# Patient Record
Sex: FEMALE | Race: WHITE | Employment: UNEMPLOYED | URBAN - METROPOLITAN AREA
[De-identification: names, ages, dates, MRNs, and addresses within clinical notes are randomized per-mention and may not be internally consistent; named-entity substitution may affect disease eponyms.]

---

## 2017-03-17 ENCOUNTER — OFFICE VISIT (OUTPATIENT)
Dept: FAMILY MEDICINE CLINIC | Age: 52
End: 2017-03-17

## 2017-03-17 VITALS
HEART RATE: 95 BPM | DIASTOLIC BLOOD PRESSURE: 71 MMHG | OXYGEN SATURATION: 98 % | WEIGHT: 131 LBS | RESPIRATION RATE: 20 BRPM | BODY MASS INDEX: 24.73 KG/M2 | SYSTOLIC BLOOD PRESSURE: 134 MMHG | TEMPERATURE: 98.8 F | HEIGHT: 61 IN

## 2017-03-17 DIAGNOSIS — R07.81 RIB PAIN ON LEFT SIDE: ICD-10-CM

## 2017-03-17 DIAGNOSIS — S22.42XA CLOSED FRACTURE OF MULTIPLE RIBS OF LEFT SIDE, INITIAL ENCOUNTER: ICD-10-CM
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
## 2017-03-17 DIAGNOSIS — E78.5 DYSLIPIDEMIA, GOAL LDL BELOW 100: Primary | Chronic | ICD-10-CM

## 2017-03-17 RX ORDER — HYDROCODONE BITARTRATE AND ACETAMINOPHEN 5; 325 MG/1; MG/1
1 TABLET ORAL
Qty: 30 TAB | Refills: 0 | Status: SHIPPED | OUTPATIENT
Start: 2017-03-17 | End: 2018-09-13 | Stop reason: ALTCHOICE

## 2017-03-17 NOTE — MR AVS SNAPSHOT
Visit Information Date & Time Provider Department Dept. Phone Encounter #  
 3/17/2017  9:30 AM Selin Velazquez NP [facility] [phone] [id number] Follow-up Instructions Return in about 6 months (around 9/17/2017), or if symptoms worsen or fail to improve. Upcoming Health Maintenance Date Due  
 PAP AKA CERVICAL CYTOLOGY 2/23/2018 BREAST CANCER SCRN MAMMOGRAM 8/12/2018 DTaP/Tdap/Td series (2 - Td) 11/30/2022 COLONOSCOPY 4/11/2026 Allergies as of 3/17/2017  Review Complete On: 3/17/2017 By: Preethi Packer LPN Severity Noted Reaction Type Reactions Flomax [Tamsulosin] High 05/20/2011    Other (comments) Severe headache Percocet [Oxycodone-acetaminophen] High 10/22/2010    Nausea and Vomiting, Other (comments)  
 dizziness Lipitor [Atorvastatin] Medium 06/24/2013   Side Effect Other (comments) Dizzy and nausea Zetia [Ezetimibe] Medium 12/10/2010   Side Effect Other (comments)  
 headaches  
 Gabapentin  03/15/2010    Nausea and Vomiting  
 dizziness Prednisone  03/15/2010    Nausea and Vomiting Current Immunizations  Reviewed on 1/14/2015 Name Date Influenza Vaccine 11/18/2014  4:43 PM, 11/22/2013, 1/14/2013 TDAP Vaccine 11/30/2012 Not reviewed this visit You Were Diagnosed With   
  
 Codes Comments Rib pain on left side    -  Primary ICD-10-CM: R07.81 ICD-9-CM: 786.50 Fall from ground level     ICD-10-CM: O16.92TX ICD-9-CM: E888.9 Dyslipidemia, goal LDL below 100     ICD-10-CM: E78.5 ICD-9-CM: 272.4 Closed fracture of multiple ribs of left side, initial encounter     ICD-10-CM: S22.42XA ICD-9-CM: 807.09 Vitals BP Pulse Temp Resp Height(growth percentile) Weight(growth percentile) 134/71 95 98.8 °F (37.1 °C) (Oral) 20 5' 1\" (1.549 m) 131 lb (59.4 kg) SpO2 BMI OB Status Smoking Status 98% 24.75 kg/m2 Needs review Never Smoker Vitals History BMI and BSA Data Body Mass Index Body Surface Area 24.75 kg/m 2 1.6 m 2 Preferred Pharmacy Pharmacy Name Phone [address], [address] [phone] Your Updated Medication List  
  
   
This list is accurate as of: 3/17/17 10:39 AM.  Always use your most recent med list.  
  
  
  
  
 atorvastatin 40 mg tablet Commonly known as:  LIPITOR  
  
 calcium citrate-vitamin D3 tablet Commonly known as:  CITRACAL WITH VITAMIN D MAXIMUM Take 1 Tab by mouth two (2) times a day. folic acid 1 mg tablet Commonly known as:  Google Take 1 mg by mouth daily. HYDROcodone-acetaminophen 5-325 mg per tablet Commonly known as:  Cholo Dolphin Take 1 Tab by mouth every six (6) hours as needed for Pain. Max Daily Amount: 4 Tabs. ibuprofen 800 mg tablet Commonly known as:  MOTRIN Take 1 Tab by mouth daily. methotrexate 2.5 mg tablet Commonly known as:  RHEUMATREX  
2.5 mg Every Friday. rosuvastatin 20 mg tablet Commonly known as:  CRESTOR Take 1 Tab by mouth nightly. traMADol 50 mg tablet Commonly known as:  ULTRAM  
TAKE 1 TABLET BY MOUTH EVERY 8 HOURS AS NEEDED FOR PAIN, MAXIMUM 3 TABLETS PER DAY. VITAMIN D3 1,000 unit tablet Generic drug:  cholecalciferol Take 1,000 Units by mouth daily. Prescriptions Printed Refills HYDROcodone-acetaminophen (NORCO) 5-325 mg per tablet 0 Sig: Take 1 Tab by mouth every six (6) hours as needed for Pain. Max Daily Amount: 4 Tabs. Class: Print Route: Oral  
  
We Performed the Following LIPID PANEL [64927 CPT(R)] METABOLIC PANEL, COMPREHENSIVE [64076 CPT(R)] Follow-up Instructions Return in about 6 months (around 9/17/2017), or if symptoms worsen or fail to improve. To-Do List   
 03/17/2017 Imaging:  XR RIBS LT W PA CXR MIN 3 V Introducing [facility]! Evan Anaya introduces Youlicit patient portal. Now you can access parts of your medical record, email your doctor's office, and request medication refills online. 1. In your internet browser, go to [email or web address] 2. Click on the First Time User? Click Here link in the Sign In box. You will see the New Member Sign Up page. 3. Enter your Youlicit Access Code exactly as it appears below. You will not need to use this code after youve completed the sign-up process. If you do not sign up before the expiration date, you must request a new code. · Youlicit Access Code: [email or web address] Expires: 6/15/2017 10:39 AM 
 
4. Enter the last four digits of your Social Security Number (xxxx) and Date of Birth (mm/dd/yyyy) as indicated and click Submit. You will be taken to the next sign-up page. 5. Create a Youlicit ID. This will be your Youlicit login ID and cannot be changed, so think of one that is secure and easy to remember. 6. Create a Youlicit password. You can change your password at any time. 7. Enter your Password Reset Question and Answer. This can be used at a later time if you forget your password. 8. Enter your e-mail address. You will receive e-mail notification when new information is available in [address] 9. Click Sign Up. You can now view and download portions of your medical record. 10. Click the Download Summary menu link to download a portable copy of your medical information. If you have questions, please visit the Frequently Asked Questions section of the Youlicit website. Remember, Youlicit is NOT to be used for urgent needs. For medical emergencies, dial 911. Now available from your iPhone and Android! Please provide this summary of care documentation to your next provider. Your primary care clinician is listed as [address] Jina Izquierdo. If you have any questions after today's visit, please call [phone].

## 2017-03-17 NOTE — PROGRESS NOTES
HISTORY OF PRESENT ILLNESS  [place] Bumpers is a 49 y.o. female. HPI  Patient comes in today for follow up cholesterol and rib pain. Patient states she fell in shower 4 days ago and fell on left side of chest/ribs on side of tub. States ribs are sore. Taking ibuprofen for pain without any relief. Denies dyspnea. When she moves around and gets up and down, it hurts. Taking crestor 20mg daily. States she is following low fat, low cholesterol diet. States she initially had muscle cramps while taking, but have since resolved. Allergies   Allergen Reactions    Flomax [Tamsulosin] Other (comments)     Severe headache    Percocet [Oxycodone-Acetaminophen] Nausea and Vomiting and Other (comments)     dizziness    Lipitor [Atorvastatin] Other (comments)     Dizzy and nausea    Zetia [Ezetimibe] Other (comments)     headaches    Gabapentin Nausea and Vomiting     dizziness    Prednisone Nausea and Vomiting       Past Medical History:   Diagnosis Date    Aortic valvar stenosis 2/23/2015    Calculus of kidney     Carotid stenosis, bilateral     CMT (Charcot Louise Tooth) disease     CTS (carpal tunnel syndrome)     High risk for fracture due to osteoporosis by DEXA scan 2/23/2015    Hypercholesteremia 3/15/2010    Osteoporosis 9/27/2014    RA (rheumatoid arthritis) ([facility] [address] 3/15/2010       Past Surgical History:   Procedure Laterality Date    HX GYN      BTL    RENAL SCOPE,REMV FB/STONE         Social History     Social History    Marital status:      Spouse name: N/A    Number of children: N/A    Years of education: N/A     Occupational History    Not on file.      Social History Main Topics    Smoking status: Never Smoker    Smokeless tobacco: Never Used    Alcohol use No    Drug use: No    Sexual activity: Not on file     Other Topics Concern    Not on file     Social History Narrative       Family History   Problem Relation Age of Onset    Thyroid Disease Mother     Elevated Lipids Mother     Heart Disease Mother     Cancer Maternal Aunt      breast    Cancer Maternal Grandmother      throat/was snuff user       Current Outpatient Prescriptions   Medication Sig    traMADol (ULTRAM) 50 mg tablet TAKE 1 TABLET BY MOUTH EVERY 8 HOURS AS NEEDED FOR PAIN, MAXIMUM 3 TABLETS PER DAY.  atorvastatin (LIPITOR) 40 mg tablet     methotrexate (RHEUMATREX) 2.5 mg tablet 2.5 mg Every Friday.  folic acid (FOLVITE) 1 mg tablet Take 1 mg by mouth daily.  ibuprofen (MOTRIN) 800 mg tablet Take 1 Tab by mouth daily.  calcium citrate-vitamin D3 (CITRACAL WITH VITAMIN D MAXIMUM) tablet Take 1 Tab by mouth two (2) times a day.  rosuvastatin (CRESTOR) 20 mg tablet Take 1 Tab by mouth nightly.  cholecalciferol (VITAMIN D3) 1,000 unit tablet Take 1,000 Units by mouth daily. No current facility-administered medications for this visit. Review of Systems   Constitutional: Negative for chills, fever and malaise/fatigue. Respiratory: Negative for cough, sputum production, shortness of breath and wheezing. Cardiovascular: Positive for chest pain (left rib pain). Negative for palpitations and leg swelling. Gastrointestinal: Negative for abdominal pain, nausea and vomiting. Genitourinary: Negative for dysuria, frequency and urgency. Musculoskeletal: Positive for myalgias (initially when taking crestor but since resolved). Skin: Negative. Neurological: Negative for dizziness and headaches. Vitals:    03/17/17 0934   BP: 134/71   Pulse: 95   Resp: 20   Temp: 98.8 °F (37.1 °C)   TempSrc: Oral   SpO2: 98%   Weight: 131 lb (59.4 kg)   Height: 5' 1\" (1.549 m)     Physical Exam   Constitutional: She is oriented to person, place, and time. Vital signs are normal. She appears well-developed. She is cooperative. Cardiovascular: Normal rate, regular rhythm and normal heart sounds.     Pulmonary/Chest: Effort normal and breath sounds normal. She exhibits tenderness (left rib cage, 7th and 8th ribs) and bony tenderness. She exhibits no laceration, no crepitus, no deformity, no swelling and no retraction. Neurological: She is alert and oriented to person, place, and time. Skin: Skin is warm and dry. Psychiatric: She has a normal mood and affect. Her behavior is normal. Judgment and thought content normal.     ASSESSMENT and PLAN    ICD-10-CM ICD-9-CM    1. Dyslipidemia, goal LDL below 100 N67.6 704.1 METABOLIC PANEL, COMPREHENSIVE      LIPID PANEL   2. Closed fracture of multiple ribs of left side, initial encounter S22.42XA 807.09 HYDROcodone-acetaminophen (NORCO) 5-325 mg per tablet   3. Rib pain on left side R07.81 786.50 XR RIBS LT W PA CXR MIN 3 V      HYDROcodone-acetaminophen (NORCO) 5-325 mg per tablet   4. Fall from ground level W18.30XA E888.9 XR RIBS LT W PA CXR MIN 3 V     Encounter Diagnoses   Name Primary?  Dyslipidemia, goal LDL below 100 Yes    Closed fracture of multiple ribs of left side, initial encounter     Rib pain on left side     Fall from ground level      Orders Placed This Encounter    XR RIBS LT W PA CXR MIN 3 V    METABOLIC PANEL, COMPREHENSIVE    LIPID PANEL    HYDROcodone-acetaminophen (NORCO) 5-325 mg per tablet     Theletra was seen today for labs and rib injury. Diagnoses and all orders for this visit:    Dyslipidemia, goal LDL below 487  -     METABOLIC PANEL, COMPREHENSIVE  -     LIPID PANEL    Closed fracture of multiple ribs of left side, initial encounter - xray report shows 4 rib fratures (6-9th ribs). Encouraged ice/heat, pain medication, splinting with coughing. RTC if develops fever, chills, cough, dyspnea. -     HYDROcodone-acetaminophen (NORCO) 5-325 mg per tablet; Take 1 Tab by mouth every six (6) hours as needed for Pain. Max Daily Amount: 4 Tabs. Rib pain on left side  -     XR RIBS LT W PA CXR MIN 3 V; Future  -     HYDROcodone-acetaminophen (NORCO) 5-325 mg per tablet;  Take 1 Tab by mouth every six (6) hours as needed for Pain. Max Daily Amount: 4 Tabs. Fall from ground level  -     XR RIBS LT W PA CXR MIN 3 V; Future      Follow-up Disposition:  Return in about 6 months (around 9/17/2017), or if symptoms worsen or fail to improve. radiology results and schedule of future radiology studies reviewed with patient    I have reviewed the patient's allergies and made any necessary changes. Medical, procedural, social and family histories have been reviewed and updated as medically indicated. I have reconciled and/or revised patient medications in the EMR. I have discussed each diagnosis listed in this note with Mary Quigley and/or their family. I have discussed treatment options and the risk/benefit analysis of those options, including safe use of medications and possible medication side effects. Through the use of shared decision making we have agreed to the above plan. The patient has received an after-visit summary and questions were answered concerning future plans. Sylvie Monroy, LIVEP-C    This note will not be viewable in [employer].

## 2017-03-18 LAB
ALBUMIN SERPL-MCNC: 4.3 G/DL (ref 3.5–5.5)
ALBUMIN/GLOB SERPL: 1.6 {RATIO} (ref 1.2–2.2)
ALP SERPL-CCNC: 71 IU/L (ref 39–117)
ALT SERPL-CCNC: 10 IU/L (ref 0–32)
AST SERPL-CCNC: 12 IU/L (ref 0–40)
BILIRUB SERPL-MCNC: 0.3 MG/DL (ref 0–1.2)
BUN SERPL-MCNC: 15 MG/DL (ref 6–24)
BUN/CREAT SERPL: 31 (ref 9–23)
CALCIUM SERPL-MCNC: 9.2 MG/DL (ref 8.7–10.2)
CHLORIDE SERPL-SCNC: 103 MMOL/L (ref 96–106)
CHOLEST SERPL-MCNC: 252 MG/DL (ref 100–199)
CO2 SERPL-SCNC: 25 MMOL/L (ref 18–29)
CREAT SERPL-MCNC: 0.49 MG/DL (ref 0.57–1)
GLOBULIN SER CALC-MCNC: 2.7 G/DL (ref 1.5–4.5)
GLUCOSE SERPL-MCNC: 82 MG/DL (ref 65–99)
HDLC SERPL-MCNC: 48 MG/DL
INTERPRETATION, 910389: NORMAL
LDLC SERPL CALC-MCNC: 184 MG/DL (ref 0–99)
POTASSIUM SERPL-SCNC: 4.1 MMOL/L (ref 3.5–5.2)
PROT SERPL-MCNC: 7 G/DL (ref 6–8.5)
SODIUM SERPL-SCNC: 143 MMOL/L (ref 134–144)
TRIGL SERPL-MCNC: 99 MG/DL (ref 0–149)
VLDLC SERPL CALC-MCNC: 20 MG/DL (ref 5–40)

## 2017-05-03 ENCOUNTER — TELEPHONE (OUTPATIENT)
Dept: FAMILY MEDICINE CLINIC | Age: 52
End: 2017-05-03

## 2017-05-03 DIAGNOSIS — E78.5 DYSLIPIDEMIA, GOAL LDL BELOW 100: Primary | Chronic | ICD-10-CM

## 2017-05-04 RX ORDER — ROSUVASTATIN CALCIUM 40 MG/1
40 TABLET, COATED ORAL
Qty: 30 TAB | Refills: 5 | Status: SHIPPED | OUTPATIENT
Start: 2017-05-04 | End: 2017-10-03 | Stop reason: ALTCHOICE

## 2017-05-04 NOTE — TELEPHONE ENCOUNTER
Please provide patient with cholesterol results. Her numbers did not improve much on Crestor. Would like to increase to 40mg daily. Will send new prescription.   Can take 2 tabs of 20mg until she runs out, then start new 40mg tabs

## 2017-05-15 RX ORDER — TRAMADOL HYDROCHLORIDE 50 MG/1
TABLET ORAL
Qty: 30 TAB | Refills: 2 | OUTPATIENT
Start: 2017-05-15 | End: 2018-09-13 | Stop reason: ALTCHOICE

## 2017-05-15 NOTE — TELEPHONE ENCOUNTER
----- Message from Panfilo Sanchez sent at 5/15/2017 10:38 AM EDT -----  Regarding: NP [place]/refill  Pt requesting refill on \"Tramadol\" to [facility] on [address] [phone]. Best contact number [phone].

## 2017-06-29 ENCOUNTER — OFFICE VISIT (OUTPATIENT)
Dept: FAMILY MEDICINE CLINIC | Age: 52
End: 2017-06-29

## 2017-06-29 VITALS
DIASTOLIC BLOOD PRESSURE: 67 MMHG | TEMPERATURE: 98.6 F | SYSTOLIC BLOOD PRESSURE: 140 MMHG | HEIGHT: 61 IN | HEART RATE: 89 BPM | OXYGEN SATURATION: 98 % | RESPIRATION RATE: 20 BRPM | BODY MASS INDEX: 24.55 KG/M2 | WEIGHT: 130 LBS

## 2017-06-29 DIAGNOSIS — M25.531 RIGHT WRIST PAIN: ICD-10-CM

## 2017-06-29 DIAGNOSIS — M54.2 NECK PAIN: Primary | ICD-10-CM

## 2017-06-29 RX ORDER — CYCLOBENZAPRINE HCL 10 MG
10 TABLET ORAL
Qty: 30 TAB | Refills: 0 | Status: SHIPPED | OUTPATIENT
Start: 2017-06-29 | End: 2017-10-03 | Stop reason: ALTCHOICE

## 2017-06-29 NOTE — PATIENT INSTRUCTIONS
Neck Strain: Care Instructions  Your Care Instructions  You have strained the muscles and ligaments in your neck. A sudden, awkward movement can strain the neck. This often occurs with falls or car accidents or during certain sports. Everyday activities like working on a computer or sleeping can also cause neck strain if they force you to hold your neck in an awkward position for a long time. It is common for neck pain to get worse for a day or two after an injury, but it should start to feel better after that. You may have more pain and stiffness for several days before it gets better. This is expected. It may take a few weeks or longer for it to heal completely. Good home treatment can help you get better faster and avoid future neck problems. Follow-up care is a key part of your treatment and safety. Be sure to make and go to all appointments, and call your doctor if you are having problems. It's also a good idea to know your test results and keep a list of the medicines you take. How can you care for yourself at home? · If you were given a neck brace (cervical collar) to limit neck motion, wear it as instructed for as many days as your doctor tells you to. Do not wear it longer than you were told to. Wearing a brace for too long can make neck stiffness worse and weaken the neck muscles. · You can try using heat or ice to see if it helps. ¨ Try using a heating pad on a low or medium setting for 15 to 20 minutes every 2 to 3 hours. Try a warm shower in place of one session with the heating pad. You can also buy single-use heat wraps that last up to 8 hours. ¨ You can also try an ice pack for 10 to 15 minutes every 2 to 3 hours. · Take pain medicines exactly as directed. ¨ If the doctor gave you a prescription medicine for pain, take it as prescribed. ¨ If you are not taking a prescription pain medicine, ask your doctor if you can take an over-the-counter medicine.   · Gently rub the area to relieve pain and help with blood flow. Do not massage the area if it hurts to do so. · Do not do anything that makes the pain worse. Take it easy for a couple of days. You can do your usual activities if they do not hurt your neck or put it at risk for more stress or injury. · Try sleeping on a special neck pillow. Place it under your neck, not under your head. Placing a tightly rolled-up towel under your neck while you sleep will also work. If you use a neck pillow or rolled towel, do not use your regular pillow at the same time. · To prevent future neck pain, do exercises to stretch and strengthen your neck and back. Learn how to use good posture, safe lifting techniques, and proper body mechanics. When should you call for help? Call 911 anytime you think you may need emergency care. For example, call if:  · You are unable to move an arm or a leg at all. Call your doctor now or seek immediate medical care if:  · You have new or worse symptoms in your arms, legs, chest, belly, or buttocks. Symptoms may include:  ¨ Numbness or tingling. ¨ Weakness. ¨ Pain. · You lose bladder or bowel control. Watch closely for changes in your health, and be sure to contact your doctor if:  · You are not getting better as expected. Where can you learn more? Go to http://sherry-moon.info/. Enter M253 in the search box to learn more about \"Neck Strain: Care Instructions. \"  Current as of: March 21, 2017  Content Version: 11.3  © 9609-3052 [employer], [employer]. Care instructions adapted under license by [employer] (which disclaims liability or warranty for this information). If you have questions about a medical condition or this instruction, always ask your healthcare professional. [address] any warranty or liability for your use of this information.        Neck Strain or Sprain: Rehab Exercises  Your Care Instructions  Here are some examples of typical rehabilitation exercises for your condition. Start each exercise slowly. Ease off the exercise if you start to have pain. Your doctor or physical therapist will tell you when you can start these exercises and which ones will work best for you. How to do the exercises  Neck rotation    1. Sit in a firm chair, or stand up straight. 2. Keeping your chin level, turn your head to the right, and hold for 15 to 30 seconds. 3. Turn your head to the left and hold for 15 to 30 seconds. 4. Repeat 2 to 4 times to each side. Neck stretches    1. Look straight ahead, and tip your right ear to your right shoulder. Do not let your left shoulder rise up as you tip your head to the right. 2. Hold for 15 to 30 seconds. 3. Tilt your head to the left. Do not let your right shoulder rise up as you tip your head to the left. 4. Hold for 15 to 30 seconds. 5. Repeat 2 to 4 times to each side. Forward neck flexion    1. Sit in a firm chair, or stand up straight. 2. Bend your head forward. 3. Hold for 15 to 30 seconds. 4. Repeat 2 to 4 times. Lateral (side) bend strengthening    1. With your right hand, place your first two fingers on your right temple. 2. Start to bend your head to the side while using gentle pressure from your fingers to keep your head from bending. 3. Hold for about 6 seconds. 4. Repeat 8 to 12 times. 5. Switch hands and repeat the same exercise on your left side. Forward bend strengthening    1. Place your first two fingers of either hand on your forehead. 2. Start to bend your head forward while using gentle pressure from your fingers to keep your head from bending. 3. Hold for about 6 seconds. 4. Repeat 8 to 12 times. Neutral position strengthening    1. Using one hand, place your fingertips on the back of your head at the top of your neck. 2. Start to bend your head backward while using gentle pressure from your fingers to keep your head from bending. 3. Hold for about 6 seconds.   4. Repeat 8 to 12 times.  Chin tuck    1. Lie on the floor with a rolled-up towel under your neck. Your head should be touching the floor. 2. Slowly bring your chin toward your chest.  3. Hold for a count of 6, and then relax for up to 10 seconds. 4. Repeat 8 to 12 times. Follow-up care is a key part of your treatment and safety. Be sure to make and go to all appointments, and call your doctor if you are having problems. It's also a good idea to know your test results and keep a list of the medicines you take. Where can you learn more? Go to http://sherry-moon.info/. Enter M679 in the search box to learn more about \"Neck Strain or Sprain: Rehab Exercises. \"  Current as of: March 21, 2017  Content Version: 11.3  © 2438-5631 [employer]. Care instructions adapted under license by [employer] (which disclaims liability or warranty for this information). If you have questions about a medical condition or this instruction, always ask your healthcare professional. [address] any warranty or liability for your use of this information.

## 2017-06-29 NOTE — MR AVS SNAPSHOT
Visit Information Date & Time Provider Department Dept. Phone Encounter #  
 6/29/2017  4:00 PM Lian Romero NP [facility] [phone] [id number] Follow-up Instructions Return if symptoms worsen or fail to improve. Upcoming Health Maintenance Date Due INFLUENZA AGE 9 TO ADULT 8/1/2017 PAP AKA CERVICAL CYTOLOGY 2/23/2018 BREAST CANCER SCRN MAMMOGRAM 8/12/2018 DTaP/Tdap/Td series (2 - Td) 11/30/2022 COLONOSCOPY 4/11/2026 Allergies as of 6/29/2017  Review Complete On: 6/29/2017 By: Laci Black LPN Severity Noted Reaction Type Reactions Flomax [Tamsulosin] High 05/20/2011    Other (comments) Severe headache Percocet [Oxycodone-acetaminophen] High 10/22/2010    Nausea and Vomiting, Other (comments)  
 dizziness Lipitor [Atorvastatin] Medium 06/24/2013   Side Effect Other (comments) Dizzy and nausea Zetia [Ezetimibe] Medium 12/10/2010   Side Effect Other (comments)  
 headaches  
 Gabapentin  03/15/2010    Nausea and Vomiting  
 dizziness Prednisone  03/15/2010    Nausea and Vomiting Current Immunizations  Reviewed on 1/14/2015 Name Date Influenza Vaccine 11/18/2014  4:43 PM, 11/22/2013, 1/14/2013 TDAP Vaccine 11/30/2012 Not reviewed this visit Vitals BP Pulse Temp Resp Height(growth percentile) Weight(growth percentile) 140/67 89 98.6 °F (37 °C) (Oral) 20 5' 1\" (1.549 m) 130 lb (59 kg) SpO2 BMI OB Status Smoking Status 98% 24.56 kg/m2 Needs review Never Smoker Vitals History BMI and BSA Data Body Mass Index Body Surface Area 24.56 kg/m 2 1.59 m 2 Preferred Pharmacy Pharmacy Name Phone [id number]- [place], [place] - [address] [phone] Your Updated Medication List  
  
   
This list is accurate as of: 6/29/17  4:44 PM.  Always use your most recent med list.  
  
  
  
  
 calcium citrate-vitamin D3 tablet Commonly known as:  CITRACAL WITH VITAMIN D MAXIMUM Take 1 Tab by mouth two (2) times a day. cyclobenzaprine 10 mg tablet Commonly known as:  FLEXERIL Take 1 Tab by mouth three (3) times daily as needed for Muscle Spasm(s). folic acid 1 mg tablet Commonly known as:  Google Take 1 mg by mouth daily. HYDROcodone-acetaminophen 5-325 mg per tablet Commonly known as:  Filipe Kilts Take 1 Tab by mouth every six (6) hours as needed for Pain. Max Daily Amount: 4 Tabs. ibuprofen 800 mg tablet Commonly known as:  MOTRIN Take 1 Tab by mouth daily. methotrexate 2.5 mg tablet Commonly known as:  RHEUMATREX  
2.5 mg Every Friday. rosuvastatin 40 mg tablet Commonly known as:  CRESTOR Take 1 Tab by mouth nightly. traMADol 50 mg tablet Commonly known as:  ULTRAM  
TAKE 1 TABLET BY MOUTH EVERY 8 HOURS AS NEEDED FOR PAIN, MAXIMUM 3 TABLETS PER DAY. VITAMIN D3 1,000 unit tablet Generic drug:  cholecalciferol Take 1,000 Units by mouth daily. Prescriptions Sent to Pharmacy Refills  
 cyclobenzaprine (FLEXERIL) 10 mg tablet 0 Sig: Take 1 Tab by mouth three (3) times daily as needed for Muscle Spasm(s). Class: Normal  
 Pharmacy: [facility] [address] Ph #: [phone] Route: Oral  
  
Follow-up Instructions Return if symptoms worsen or fail to improve. Patient Instructions Neck Strain: Care Instructions Your Care Instructions You have strained the muscles and ligaments in your neck. A sudden, awkward movement can strain the neck. This often occurs with falls or car accidents or during certain sports. Everyday activities like working on a computer or sleeping can also cause neck strain if they force you to hold your neck in an awkward position for a long time. It is common for neck pain to get worse for a day or two after an injury, but it should start to feel better after that. You may have more pain and stiffness for several days before it gets better. This is expected. It may take a few weeks or longer for it to heal completely. Good home treatment can help you get better faster and avoid future neck problems. Follow-up care is a key part of your treatment and safety. Be sure to make and go to all appointments, and call your doctor if you are having problems. It's also a good idea to know your test results and keep a list of the medicines you take. How can you care for yourself at home? · If you were given a neck brace (cervical collar) to limit neck motion, wear it as instructed for as many days as your doctor tells you to. Do not wear it longer than you were told to. Wearing a brace for too long can make neck stiffness worse and weaken the neck muscles. · You can try using heat or ice to see if it helps. ¨ Try using a heating pad on a low or medium setting for 15 to 20 minutes every 2 to 3 hours. Try a warm shower in place of one session with the heating pad. You can also buy single-use heat wraps that last up to 8 hours. ¨ You can also try an ice pack for 10 to 15 minutes every 2 to 3 hours. · Take pain medicines exactly as directed. ¨ If the doctor gave you a prescription medicine for pain, take it as prescribed. ¨ If you are not taking a prescription pain medicine, ask your doctor if you can take an over-the-counter medicine. · Gently rub the area to relieve pain and help with blood flow. Do not massage the area if it hurts to do so. · Do not do anything that makes the pain worse. Take it easy for a couple of days. You can do your usual activities if they do not hurt your neck or put it at risk for more stress or injury. · Try sleeping on a special neck pillow.  Place it under your neck, not under your head. Placing a tightly rolled-up towel under your neck while you sleep will also work. If you use a neck pillow or rolled towel, do not use your regular pillow at the same time. · To prevent future neck pain, do exercises to stretch and strengthen your neck and back. Learn how to use good posture, safe lifting techniques, and proper body mechanics. When should you call for help? Call 911 anytime you think you may need emergency care. For example, call if: 
· You are unable to move an arm or a leg at all. Call your doctor now or seek immediate medical care if: 
· You have new or worse symptoms in your arms, legs, chest, belly, or buttocks. Symptoms may include: ¨ Numbness or tingling. ¨ Weakness. ¨ Pain. · You lose bladder or bowel control. Watch closely for changes in your health, and be sure to contact your doctor if: 
· You are not getting better as expected. Where can you learn more? Go to http://sherry-moon.info/. Enter M253 in the search box to learn more about \"Neck Strain: Care Instructions. \" Current as of: March 21, 2017 Content Version: 11.3 © 5569-0978 [employer]. Care instructions adapted under license by [employer] (which disclaims liability or warranty for this information). If you have questions about a medical condition or this instruction, always ask your healthcare professional. [place] [address] any warranty or liability for your use of this information. Neck Strain or Sprain: Rehab Exercises Your Care Instructions Here are some examples of typical rehabilitation exercises for your condition. Start each exercise slowly. Ease off the exercise if you start to have pain. Your doctor or physical therapist will tell you when you can start these exercises and which ones will work best for you. How to do the exercises Neck rotation 1. Sit in a firm chair, or stand up straight. 2. Keeping your chin level, turn your head to the right, and hold for 15 to 30 seconds. 3. Turn your head to the left and hold for 15 to 30 seconds. 4. Repeat 2 to 4 times to each side. Neck stretches 1. Look straight ahead, and tip your right ear to your right shoulder. Do not let your left shoulder rise up as you tip your head to the right. 2. Hold for 15 to 30 seconds. 3. Tilt your head to the left. Do not let your right shoulder rise up as you tip your head to the left. 4. Hold for 15 to 30 seconds. 5. Repeat 2 to 4 times to each side. Forward neck flexion 1. Sit in a firm chair, or stand up straight. 2. Bend your head forward. 3. Hold for 15 to 30 seconds. 4. Repeat 2 to 4 times. Lateral (side) bend strengthening 1. With your right hand, place your first two fingers on your right temple. 2. Start to bend your head to the side while using gentle pressure from your fingers to keep your head from bending. 3. Hold for about 6 seconds. 4. Repeat 8 to 12 times. 5. Switch hands and repeat the same exercise on your left side. Forward bend strengthening 1. Place your first two fingers of either hand on your forehead. 2. Start to bend your head forward while using gentle pressure from your fingers to keep your head from bending. 3. Hold for about 6 seconds. 4. Repeat 8 to 12 times. Neutral position strengthening 1. Using one hand, place your fingertips on the back of your head at the top of your neck. 2. Start to bend your head backward while using gentle pressure from your fingers to keep your head from bending. 3. Hold for about 6 seconds. 4. Repeat 8 to 12 times. Chin tuck 1. Lie on the floor with a rolled-up towel under your neck. Your head should be touching the floor. 2. Slowly bring your chin toward your chest. 
3. Hold for a count of 6, and then relax for up to 10 seconds. 4. Repeat 8 to 12 times. Follow-up care is a key part of your treatment and safety. Be sure to make and go to all appointments, and call your doctor if you are having problems. It's also a good idea to know your test results and keep a list of the medicines you take. Where can you learn more? Go to http://sherry-moon.info/. Enter M679 in the search box to learn more about \"Neck Strain or Sprain: Rehab Exercises. \" Current as of: March 21, 2017 Content Version: 11.3 © 0975-5050 [employer]. Care instructions adapted under license by [employer] (which disclaims liability or warranty for this information). If you have questions about a medical condition or this instruction, always ask your healthcare professional. [address] any warranty or liability for your use of this information. Introducing [facility]! [facility] introduces [employer] patient portal. Now you can access parts of your medical record, email your doctor's office, and request medication refills online. 1. In your internet browser, go to [email or web address] 2. Click on the First Time User? Click Here link in the Sign In box. You will see the New Member Sign Up page. 3. Enter your [employer] Access Code exactly as it appears below. You will not need to use this code after youve completed the sign-up process. If you do not sign up before the expiration date, you must request a new code. · [employer] Access Code: [email or web address] Expires: 9/27/2017  4:44 PM 
 
4. Enter the last four digits of your Social Security Number (xxxx) and Date of Birth (mm/dd/yyyy) as indicated and click Submit. You will be taken to the next sign-up page. 5. Create a YouFoliot ID. This will be your [employer] login ID and cannot be changed, so think of one that is secure and easy to remember. 6. Create a [employer] password. You can change your password at any time. 7. Enter your Password Reset Question and Answer. This can be used at a later time if you forget your password. 8. Enter your e-mail address. You will receive e-mail notification when new information is available in [address] 9. Click Sign Up. You can now view and download portions of your medical record. 10. Click the Download Summary menu link to download a portable copy of your medical information. If you have questions, please visit the Frequently Asked Questions section of the [employer] website. Remember, [employer] is NOT to be used for urgent needs. For medical emergencies, dial 911. Now available from your iPhone and Android! Please provide this summary of care documentation to your next provider. Your primary care clinician is listed as Via Jina Izquierdo. If you have any questions after today's visit, please call [phone].

## 2017-06-29 NOTE — PROGRESS NOTES
HISTORY OF PRESENT ILLNESS  Maria Luisa Spears is a 49 y.o. female. HPI  Patient comes in today for neck pain. Complains of neck pain for 1 week. States back of neck feels stiff, not sure if slept wrong. Just woke up one day with pain. Has not injured neck that she can recall. Does not do any heavy lifting at work. Does a lot of pulling. Has been taking Aleve and Prednisone 2.5mg daily for couple days with no improvement. Complains of right wrist pain and swelling, thinks from repetitive movements at work. Denies injury. Denies numbness or tingling in extremities. Allergies   Allergen Reactions    Flomax [Tamsulosin] Other (comments)     Severe headache    Percocet [Oxycodone-Acetaminophen] Nausea and Vomiting and Other (comments)     dizziness    Lipitor [Atorvastatin] Other (comments)     Dizzy and nausea    Zetia [Ezetimibe] Other (comments)     headaches    Gabapentin Nausea and Vomiting     dizziness    Prednisone Nausea and Vomiting       Past Medical History:   Diagnosis Date    Aortic valvar stenosis 2/23/2015    Calculus of kidney     Carotid stenosis, bilateral     CMT (Charcot Louise Tooth) disease     CTS (carpal tunnel syndrome)     High risk for fracture due to osteoporosis by DEXA scan 2/23/2015    Hypercholesteremia 3/15/2010    Osteoporosis 9/27/2014    RA (rheumatoid arthritis) ([facility] [address] 3/15/2010       Past Surgical History:   Procedure Laterality Date    HX GYN      BTL    RENAL SCOPE,REMV FB/STONE         Social History     Social History    Marital status:      Spouse name: N/A    Number of children: N/A    Years of education: N/A     Occupational History    Not on file.      Social History Main Topics    Smoking status: Never Smoker    Smokeless tobacco: Never Used    Alcohol use No    Drug use: No    Sexual activity: Not on file     Other Topics Concern    Not on file     Social History Narrative       Family History   Problem Relation Age of Onset    Thyroid Disease Mother     Elevated Lipids Mother     Heart Disease Mother     Cancer Maternal Aunt      breast    Cancer Maternal Grandmother      throat/was snuff user       Current Outpatient Prescriptions   Medication Sig    traMADol (ULTRAM) 50 mg tablet TAKE 1 TABLET BY MOUTH EVERY 8 HOURS AS NEEDED FOR PAIN, MAXIMUM 3 TABLETS PER DAY.  ibuprofen (MOTRIN) 800 mg tablet Take 1 Tab by mouth daily.  calcium citrate-vitamin D3 (CITRACAL WITH VITAMIN D MAXIMUM) tablet Take 1 Tab by mouth two (2) times a day.  cholecalciferol (VITAMIN D3) 1,000 unit tablet Take 1,000 Units by mouth daily.  rosuvastatin (CRESTOR) 40 mg tablet Take 1 Tab by mouth nightly.  HYDROcodone-acetaminophen (NORCO) 5-325 mg per tablet Take 1 Tab by mouth every six (6) hours as needed for Pain. Max Daily Amount: 4 Tabs.  methotrexate (RHEUMATREX) 2.5 mg tablet 2.5 mg Every Friday.  folic acid (FOLVITE) 1 mg tablet Take 1 mg by mouth daily. No current facility-administered medications for this visit. Review of Systems   Constitutional: Negative for chills and fever. Respiratory: Negative for shortness of breath. Cardiovascular: Negative for chest pain and palpitations. Gastrointestinal: Negative for abdominal pain, nausea and vomiting. Genitourinary: Negative for dysuria, frequency and urgency. Musculoskeletal: Positive for joint pain (hx RA) and neck pain. Skin: Negative for itching and rash. Neurological: Negative for dizziness, tingling, sensory change, focal weakness and headaches. Vitals:    06/29/17 1615   BP: 140/67   Pulse: 89   Resp: 20   Temp: 98.6 °F (37 °C)   TempSrc: Oral   SpO2: 98%   Weight: 130 lb (59 kg)   Height: 5' 1\" (1.549 m)     Physical Exam   Constitutional: She is oriented to person, place, and time. Vital signs are normal. She appears well-developed and well-nourished. She is cooperative. Neck: Muscular tenderness (left trapezius) present.  No spinous process tenderness present. Decreased range of motion (decreased lateral rotation) present. No erythema present. Cardiovascular: Normal rate, regular rhythm and normal heart sounds. Pulses:       Radial pulses are 2+ on the right side, and 2+ on the left side. Pulmonary/Chest: Effort normal and breath sounds normal.   Musculoskeletal:        Right wrist: She exhibits tenderness, bony tenderness and swelling. She exhibits normal range of motion, no effusion, no crepitus, no deformity and no laceration. Neurological: She is alert and oriented to person, place, and time. Skin: Skin is warm and dry. Psychiatric: She has a normal mood and affect. Her behavior is normal. Judgment and thought content normal.   Vitals reviewed. ASSESSMENT and PLAN    ICD-10-CM ICD-9-CM    1. Neck pain M54.2 723.1 cyclobenzaprine (FLEXERIL) 10 mg tablet   2. Right wrist pain M25.531 719.43      Encounter Diagnoses   Name Primary?  Neck pain Yes    Right wrist pain      Orders Placed This Encounter    cyclobenzaprine (FLEXERIL) 10 mg tablet     Samantha Jhaveri was seen today for neck pain and cyst.    Diagnoses and all orders for this visit:    Neck pain - probable strain. Patient has prescription of Prednisone 5mg tabs at home. Patient to take 2.5mg TID x2d, 2.5 mg BID x2d, and 2.5mg daily x2d. Given muscle relaxer. May use heat, given HEP. RTC if no improvement. -     cyclobenzaprine (FLEXERIL) 10 mg tablet; Take 1 Tab by mouth three (3) times daily as needed for Muscle Spasm(s). Right wrist pain - prob tendinitis. steroid taper, wrist brace, exercises. Follow-up Disposition:  Return if symptoms worsen or fail to improve. I have reviewed the patient's allergies and made any necessary changes. Medical, procedural, social and family histories have been reviewed and updated as medically indicated. I have reconciled and/or revised patient medications in the EMR.        I have discussed each diagnosis listed in this note with Samantha Jhaveri Reinaldo Postal and/or their family. I have discussed treatment options and the risk/benefit analysis of those options, including safe use of medications and possible medication side effects. Through the use of shared decision making we have agreed to the above plan. The patient has received an after-visit summary and questions were answered concerning future plans. Sylvie Monroy, LIVEP-C    This note will not be viewable in restorgenex corpt.

## 2017-06-30 ENCOUNTER — DOCUMENTATION ONLY (OUTPATIENT)
Dept: FAMILY MEDICINE CLINIC | Age: 52
End: 2017-06-30

## 2017-10-03 ENCOUNTER — OFFICE VISIT (OUTPATIENT)
Dept: FAMILY MEDICINE CLINIC | Age: 52
End: 2017-10-03

## 2017-10-03 VITALS
SYSTOLIC BLOOD PRESSURE: 138 MMHG | TEMPERATURE: 99.6 F | HEART RATE: 82 BPM | OXYGEN SATURATION: 98 % | HEIGHT: 61 IN | DIASTOLIC BLOOD PRESSURE: 61 MMHG | WEIGHT: 129 LBS | RESPIRATION RATE: 20 BRPM | BODY MASS INDEX: 24.35 KG/M2

## 2017-10-03 DIAGNOSIS — E78.5 DYSLIPIDEMIA, GOAL LDL BELOW 100: Chronic | ICD-10-CM

## 2017-10-03 DIAGNOSIS — M54.2 NECK PAIN: Primary | ICD-10-CM

## 2017-10-03 RX ORDER — NAPROXEN 500 MG/1
500 TABLET ORAL 2 TIMES DAILY WITH MEALS
Qty: 60 TAB | Refills: 1 | Status: SHIPPED | OUTPATIENT
Start: 2017-10-03 | End: 2018-09-13 | Stop reason: ALTCHOICE

## 2017-10-03 RX ORDER — METHOCARBAMOL 500 MG/1
500 TABLET, FILM COATED ORAL 3 TIMES DAILY
Qty: 30 TAB | Refills: 0 | Status: SHIPPED | OUTPATIENT
Start: 2017-10-03 | End: 2018-02-01 | Stop reason: SDUPTHER

## 2017-10-03 RX ORDER — ROSUVASTATIN CALCIUM 40 MG/1
40 TABLET, COATED ORAL
Qty: 30 TAB | Refills: 5 | Status: SHIPPED | OUTPATIENT
Start: 2017-10-03 | End: 2018-02-26 | Stop reason: SDUPTHER

## 2017-10-03 NOTE — PROGRESS NOTES
HISTORY OF PRESENT ILLNESS  Maddy Lorenzo is a 49 y.o. female. HPI  Patient comes in today for neck pain. Has not been on Crestor since visit in June. States insurance was not covering brand name, not sure why they would not change medication to generic because it was not written as brand only. Complains of neck pain off and on for 3 months. States neck pain went away for a while. Feel like in middle of neck. States pain causes her to feel lightheaded. Prednisone helped some but once stop medicine, pain returns. No headaches. Notices more at work where she stands when head leaning over. Has not injured neck that she can recall. Does not do any heavy lifting at work. Does a lot of pulling. Has been taking Aleve with some relief and Prednisone 2.5mg daily  with no improvement. Allergies   Allergen Reactions    Flomax [Tamsulosin] Other (comments)     Severe headache    Percocet [Oxycodone-Acetaminophen] Nausea and Vomiting and Other (comments)     dizziness    Lipitor [Atorvastatin] Other (comments)     Dizzy and nausea    Zetia [Ezetimibe] Other (comments)     headaches    Gabapentin Nausea and Vomiting     dizziness    Prednisone Nausea and Vomiting       Past Medical History:   Diagnosis Date    Aortic valvar stenosis 2/23/2015    Calculus of kidney     Carotid stenosis, bilateral     CMT (Charcot Louise Tooth) disease     CTS (carpal tunnel syndrome)     High risk for fracture due to osteoporosis by DEXA scan 2/23/2015    Hypercholesteremia 3/15/2010    Osteoporosis 9/27/2014    RA (rheumatoid arthritis) ([facility] [address] 3/15/2010       Past Surgical History:   Procedure Laterality Date    HX GYN      BTL    RENAL SCOPE,REMV FB/STONE         Social History     Social History    Marital status:      Spouse name: N/A    Number of children: N/A    Years of education: N/A     Occupational History    Not on file.      Social History Main Topics    Smoking status: Never Smoker    Smokeless tobacco: Never Used    Alcohol use No    Drug use: No    Sexual activity: Not on file     Other Topics Concern    Not on file     Social History Narrative       Family History   Problem Relation Age of Onset    Thyroid Disease Mother     Elevated Lipids Mother     Heart Disease Mother     Cancer Maternal Aunt      breast    Cancer Maternal Grandmother      throat/was snuff user       Current Outpatient Prescriptions   Medication Sig    cyclobenzaprine (FLEXERIL) 10 mg tablet Take 1 Tab by mouth three (3) times daily as needed for Muscle Spasm(s).  methotrexate (RHEUMATREX) 2.5 mg tablet 2.5 mg Every Friday.  folic acid (FOLVITE) 1 mg tablet Take 1 mg by mouth daily.  ibuprofen (MOTRIN) 800 mg tablet Take 1 Tab by mouth daily.  calcium citrate-vitamin D3 (CITRACAL WITH VITAMIN D MAXIMUM) tablet Take 1 Tab by mouth two (2) times a day.  traMADol (ULTRAM) 50 mg tablet TAKE 1 TABLET BY MOUTH EVERY 8 HOURS AS NEEDED FOR PAIN, MAXIMUM 3 TABLETS PER DAY.  rosuvastatin (CRESTOR) 40 mg tablet Take 1 Tab by mouth nightly.  HYDROcodone-acetaminophen (NORCO) 5-325 mg per tablet Take 1 Tab by mouth every six (6) hours as needed for Pain. Max Daily Amount: 4 Tabs.  cholecalciferol (VITAMIN D3) 1,000 unit tablet Take 1,000 Units by mouth daily. No current facility-administered medications for this visit. Review of Systems   Constitutional: Negative for chills and fever. Respiratory: Negative for shortness of breath. Cardiovascular: Negative for chest pain and palpitations. Gastrointestinal: Negative for abdominal pain, nausea and vomiting. Genitourinary: Negative for dysuria, frequency and urgency. Musculoskeletal: Positive for joint pain (hx RA) and neck pain. Skin: Negative for itching and rash. Neurological: Negative for dizziness, tingling, sensory change, focal weakness and headaches.      Vitals:    10/03/17 1623   BP: 138/61   Pulse: 82   Resp: 20   Temp: 99.6 °F (37.6 °C)   TempSrc: Oral   SpO2: 98%   Weight: 129 lb (58.5 kg)   Height: 5' 1\" (1.549 m)     Physical Exam   Constitutional: She is oriented to person, place, and time. Vital signs are normal. She appears well-developed and well-nourished. She is cooperative. Neck: Spinous process tenderness present. No muscular tenderness present. Decreased range of motion (decreased lateral rotation) present. No erythema present. Cardiovascular: Normal rate, regular rhythm and normal heart sounds. Pulses:       Radial pulses are 2+ on the right side, and 2+ on the left side. Pulmonary/Chest: Effort normal and breath sounds normal.   Musculoskeletal:   Muscle strength 5/5 BUEs, sensation/NV intact   Neurological: She is alert and oriented to person, place, and time. Skin: Skin is warm and dry. Psychiatric: She has a normal mood and affect. Her behavior is normal. Judgment and thought content normal.   Vitals reviewed. ASSESSMENT and PLAN    ICD-10-CM ICD-9-CM    1. Neck pain M54.2 723.1 XR SPINE CERV PA LAT ODONT 3 V MAX      methocarbamol (ROBAXIN) 500 mg tablet      naproxen (NAPROSYN) 500 mg tablet   2. Dyslipidemia, goal LDL below 100 E78.5 272.4 rosuvastatin (CRESTOR) 40 mg tablet      METABOLIC PANEL, COMPREHENSIVE      LIPID PANEL     Encounter Diagnoses   Name Primary?  Neck pain Yes    Dyslipidemia, goal LDL below 100      Orders Placed This Encounter    XR SPINE CERV PA LAT ODONT 3 V MAX    METABOLIC PANEL, COMPREHENSIVE    LIPID PANEL    rosuvastatin (CRESTOR) 40 mg tablet    methocarbamol (ROBAXIN) 500 mg tablet    naproxen (NAPROSYN) 500 mg tablet     Diagnoses and all orders for this visit:    1. Neck pain - prob strain, related to job. Will try NSAID, muscle relaxer, heat, HEP. If no improvement, will refer to ortho  -     XR SPINE CERV PA LAT ODONT 3 V MAX; Future  -     methocarbamol (ROBAXIN) 500 mg tablet; Take 1 Tab by mouth three (3) times daily.  As needed for neck pain  - naproxen (NAPROSYN) 500 mg tablet; Take 1 Tab by mouth two (2) times daily (with meals). As needed for neck pain    2. Dyslipidemia, goal LDL below 100  -     rosuvastatin (CRESTOR) 40 mg tablet; Take 1 Tab by mouth nightly. -     METABOLIC PANEL, COMPREHENSIVE  -     LIPID PANEL      Follow-up Disposition:  Return in about 6 months (around 4/3/2018), or if symptoms worsen or fail to improve.  lab results and schedule of future lab studies reviewed with patient  cardiovascular risk and specific lipid/LDL goals reviewed  radiology results and schedule of future radiology studies reviewed with patient    I have reviewed the patient's allergies and made any necessary changes. Medical, procedural, social and family histories have been reviewed and updated as medically indicated. I have reconciled and/or revised patient medications in the EMR. I have discussed each diagnosis listed in this note with Monster Byers and/or their family. I have discussed treatment options and the risk/benefit analysis of those options, including safe use of medications and possible medication side effects. Through the use of shared decision making we have agreed to the above plan. The patient has received an after-visit summary and questions were answered concerning future plans. Sylvie Monroy, JESICA-C    This note will not be viewable in hybrist.

## 2017-10-03 NOTE — PATIENT INSTRUCTIONS
Neck Strain or Sprain: Rehab Exercises  Your Care Instructions  Here are some examples of typical rehabilitation exercises for your condition. Start each exercise slowly. Ease off the exercise if you start to have pain. Your doctor or physical therapist will tell you when you can start these exercises and which ones will work best for you. How to do the exercises  Neck rotation    1. Sit in a firm chair, or stand up straight. 2. Keeping your chin level, turn your head to the right, and hold for 15 to 30 seconds. 3. Turn your head to the left and hold for 15 to 30 seconds. 4. Repeat 2 to 4 times to each side. Neck stretches    1. Look straight ahead, and tip your right ear to your right shoulder. Do not let your left shoulder rise up as you tip your head to the right. 2. Hold for 15 to 30 seconds. 3. Tilt your head to the left. Do not let your right shoulder rise up as you tip your head to the left. 4. Hold for 15 to 30 seconds. 5. Repeat 2 to 4 times to each side. Forward neck flexion    1. Sit in a firm chair, or stand up straight. 2. Bend your head forward. 3. Hold for 15 to 30 seconds. 4. Repeat 2 to 4 times. Lateral (side) bend strengthening    1. With your right hand, place your first two fingers on your right temple. 2. Start to bend your head to the side while using gentle pressure from your fingers to keep your head from bending. 3. Hold for about 6 seconds. 4. Repeat 8 to 12 times. 5. Switch hands and repeat the same exercise on your left side. Forward bend strengthening    1. Place your first two fingers of either hand on your forehead. 2. Start to bend your head forward while using gentle pressure from your fingers to keep your head from bending. 3. Hold for about 6 seconds. 4. Repeat 8 to 12 times. Neutral position strengthening    1. Using one hand, place your fingertips on the back of your head at the top of your neck.   2. Start to bend your head backward while using gentle pressure from your fingers to keep your head from bending. 3. Hold for about 6 seconds. 4. Repeat 8 to 12 times. Chin tuck    1. Lie on the floor with a rolled-up towel under your neck. Your head should be touching the floor. 2. Slowly bring your chin toward your chest.  3. Hold for a count of 6, and then relax for up to 10 seconds. 4. Repeat 8 to 12 times. Follow-up care is a key part of your treatment and safety. Be sure to make and go to all appointments, and call your doctor if you are having problems. It's also a good idea to know your test results and keep a list of the medicines you take. Where can you learn more? Go to http://sherry-moon.info/. Enter M679 in the search box to learn more about \"Neck Strain or Sprain: Rehab Exercises. \"  Current as of: March 21, 2017  Content Version: 11.3  © 3052-5746 [employer] Care instructions adapted under license by [employer] (which disclaims liability or warranty for this information). If you have questions about a medical condition or this instruction, always ask your healthcare professional. [address] any warranty or liability for your use of this information. Neck: Exercises  Your Care Instructions  Here are some examples of typical rehabilitation exercises for your condition. Start each exercise slowly. Ease off the exercise if you start to have pain. Your doctor or physical therapist will tell you when you can start these exercises and which ones will work best for you. How to do the exercises  Note: Stretching should make you feel a gentle stretch, but no pain. Stop any strengthening exercise that makes pain worse. Neck stretch    5. This stretch works best if you keep your shoulder down as you lean away from it. To help you remember to do this, start by relaxing your shoulders and lightly holding on to your thighs or your chair.   6. Tilt your head toward your shoulder and hold for 15 to 30 seconds. Let the weight of your head stretch your muscles. 7. If you would like a little added stretch, use your hand to gently and steadily pull your head toward your shoulder. For example, keeping your right shoulder down, lean your head to the left. 8. Repeat 2 to 4 times toward each shoulder. Diagonal neck stretch    6. Turn your head slightly toward the direction you will be stretching, and tilt your head diagonally toward your chest and hold for 15 to 30 seconds. 7. If you would like a little added stretch, use your hand to gently and steadily pull your head forward on the diagonal.  8. Repeat 2 to 4 times toward each side. Dorsal glide stretch    5. Sit or stand tall and look straight ahead. 6. Slowly tuck your chin as you glide your head backward over your body  7. Hold for a count of 6, and then relax for up to 10 seconds. 8. Repeat 8 to 12 times. Note: The dorsal glide stretches the back of the neck. If you feel pain, do not glide so far back. Some people find this exercise easier to do while lying on their backs with an ice pack on the neck. Chest and shoulder stretch    6. Sit or stand tall and glide your head backward as in the dorsal glide stretch. 7. Raise both arms so that your hands are next to your ears. 8. Take a deep breath, and as you breathe out, lower your elbows down and behind your back. You will feel your shoulder blades slide down and together, and at the same time you will feel a stretch across your chest and the front of your shoulders. 9. Hold for about 6 seconds, and then relax for up to 10 seconds. 10. Repeat 8 to 12 times. Strengthening: Hands on head    5. Move your head backward, forward, and side to side against gentle pressure from your hands, holding each position for about 6 seconds. 6. Repeat 8 to 12 times. Follow-up care is a key part of your treatment and safety.  Be sure to make and go to all appointments, and call your doctor if you are having problems. It's also a good idea to know your test results and keep a list of the medicines you take. Where can you learn more? Go to http://sherry-moon.info/. Enter P975 in the search box to learn more about \"Neck: Exercises. \"  Current as of: March 21, 2017  Content Version: 11.3  © 0553-5168 [employer] Care instructions adapted under license by [employer] (which disclaims liability or warranty for this information). If you have questions about a medical condition or this instruction, always ask your healthcare professional. [place] [address] any warranty or liability for your use of this information.

## 2017-10-03 NOTE — MR AVS SNAPSHOT
Visit Information Date & Time Provider Department Dept. Phone Encounter #  
 10/3/2017  4:30 PM Crystal Barrett NP [facility] [phone] [id number] Follow-up Instructions Return in about 6 months (around 4/3/2018), or if symptoms worsen or fail to improve. Upcoming Health Maintenance Date Due INFLUENZA AGE 9 TO ADULT 8/1/2017 PAP AKA CERVICAL CYTOLOGY 2/23/2018 BREAST CANCER SCRN MAMMOGRAM 8/12/2018 DTaP/Tdap/Td series (2 - Td) 11/30/2022 COLONOSCOPY 4/11/2026 Allergies as of 10/3/2017  Review Complete On: 10/3/2017 By: Alan Malone LPN Severity Noted Reaction Type Reactions Flomax [Tamsulosin] High 05/20/2011    Other (comments) Severe headache Percocet [Oxycodone-acetaminophen] High 10/22/2010    Nausea and Vomiting, Other (comments)  
 dizziness Lipitor [Atorvastatin] Medium 06/24/2013   Side Effect Other (comments) Dizzy and nausea Zetia [Ezetimibe] Medium 12/10/2010   Side Effect Other (comments)  
 headaches  
 Gabapentin  03/15/2010    Nausea and Vomiting  
 dizziness Prednisone  03/15/2010    Nausea and Vomiting Current Immunizations  Reviewed on 1/14/2015 Name Date Influenza Vaccine 11/18/2014  4:43 PM, 11/22/2013, 1/14/2013 TDAP Vaccine 11/30/2012 Not reviewed this visit You Were Diagnosed With   
  
 Codes Comments Dyslipidemia, goal LDL below 100    -  Primary ICD-10-CM: E78.5 ICD-9-CM: 272.4 Neck pain     ICD-10-CM: M54.2 ICD-9-CM: 723.1 Vitals BP Pulse Temp Resp Height(growth percentile) Weight(growth percentile)  
 138/61 82 99.6 °F (37.6 °C) (Oral) 20 5' 1\" (1.549 m) 129 lb (58.5 kg) SpO2 BMI OB Status Smoking Status 98% 24.37 kg/m2 Needs review Never Smoker Vitals History BMI and BSA Data Body Mass Index Body Surface Area  
 24.37 kg/m 2 1.59 m 2 Preferred Pharmacy Pharmacy Name Phone [id number] [address] [phone] Your Updated Medication List  
  
   
This list is accurate as of: 10/3/17  5:26 PM.  Always use your most recent med list.  
  
  
  
  
 calcium citrate-vitamin D3 tablet Commonly known as:  CITRACAL WITH VITAMIN D MAXIMUM Take 1 Tab by mouth two (2) times a day. folic acid 1 mg tablet Commonly known as:  Google Take 1 mg by mouth daily. HYDROcodone-acetaminophen 5-325 mg per tablet Commonly known as:  Newman Minor Take 1 Tab by mouth every six (6) hours as needed for Pain. Max Daily Amount: 4 Tabs. methocarbamol 500 mg tablet Commonly known as:  ROBAXIN Take 1 Tab by mouth three (3) times daily. As needed for neck pain  
  
 methotrexate 2.5 mg tablet Commonly known as:  RHEUMATREX  
2.5 mg Every Friday. naproxen 500 mg tablet Commonly known as:  NAPROSYN Take 1 Tab by mouth two (2) times daily (with meals). As needed for neck pain  
  
 rosuvastatin 40 mg tablet Commonly known as:  CRESTOR Take 1 Tab by mouth nightly. traMADol 50 mg tablet Commonly known as:  ULTRAM  
TAKE 1 TABLET BY MOUTH EVERY 8 HOURS AS NEEDED FOR PAIN, MAXIMUM 3 TABLETS PER DAY. VITAMIN D3 1,000 unit tablet Generic drug:  cholecalciferol Take 1,000 Units by mouth daily. Prescriptions Printed Refills  
 rosuvastatin (CRESTOR) 40 mg tablet 5 Sig: Take 1 Tab by mouth nightly. Class: Print Route: Oral  
  
Prescriptions Sent to Pharmacy Refills  
 methocarbamol (ROBAXIN) 500 mg tablet 0 Sig: Take 1 Tab by mouth three (3) times daily. As needed for neck pain  
 Class: Normal  
 Pharmacy: [id number] [address]  #: [phone] Route: Oral  
 naproxen (NAPROSYN) 500 mg tablet 1 Sig: Take 1 Tab by mouth two (2) times daily (with meals). As needed for neck pain Class: Normal  
 Pharmacy: [facility] [id number] [address] [facility] [address]  #: [phone] Route: Oral  
  
We Performed the Following LIPID PANEL [62931 CPT(R)] METABOLIC PANEL, COMPREHENSIVE [24983 CPT(R)] Follow-up Instructions Return in about 6 months (around 4/3/2018), or if symptoms worsen or fail to improve. To-Do List   
 10/03/2017 Imaging:  XR SPINE CERV PA LAT ODONT 3 V MAX   
  
 10/05/2017 1:00 PM  
  Appointment with [facility] ABDULLAHI 1 at [facility] ([phone]) Shower or bathe using soap and water. Do not use deodorant, powder, perfumes, or lotion the day of your exam.  If your prior mammograms were not performed at [facility] 6 please bring films with you or forward prior images 2 days before your procedure. Check in at registration 15min before your appointment time unless you were instructed to do otherwise. A script is not necessary, but if you have one, please bring it on the day of the mammogram or have it faxed to the department. [facility] [phone] [facility]  [phone] [facility] 19 JAYME  [phone] [facility] [phone] [address] [phone] Patient Instructions Neck Strain or Sprain: Rehab Exercises Your Care Instructions Here are some examples of typical rehabilitation exercises for your condition. Start each exercise slowly. Ease off the exercise if you start to have pain. Your doctor or physical therapist will tell you when you can start these exercises and which ones will work best for you. How to do the exercises Neck rotation 1. Sit in a firm chair, or stand up straight. 2. Keeping your chin level, turn your head to the right, and hold for 15 to 30 seconds. 3. Turn your head to the left and hold for 15 to 30 seconds. 4. Repeat 2 to 4 times to each side. Neck stretches 1. Look straight ahead, and tip your right ear to your right shoulder.  Do not let your left shoulder rise up as you tip your head to the right. 2. Hold for 15 to 30 seconds. 3. Tilt your head to the left. Do not let your right shoulder rise up as you tip your head to the left. 4. Hold for 15 to 30 seconds. 5. Repeat 2 to 4 times to each side. Forward neck flexion 1. Sit in a firm chair, or stand up straight. 2. Bend your head forward. 3. Hold for 15 to 30 seconds. 4. Repeat 2 to 4 times. Lateral (side) bend strengthening 1. With your right hand, place your first two fingers on your right temple. 2. Start to bend your head to the side while using gentle pressure from your fingers to keep your head from bending. 3. Hold for about 6 seconds. 4. Repeat 8 to 12 times. 5. Switch hands and repeat the same exercise on your left side. Forward bend strengthening 1. Place your first two fingers of either hand on your forehead. 2. Start to bend your head forward while using gentle pressure from your fingers to keep your head from bending. 3. Hold for about 6 seconds. 4. Repeat 8 to 12 times. Neutral position strengthening 1. Using one hand, place your fingertips on the back of your head at the top of your neck. 2. Start to bend your head backward while using gentle pressure from your fingers to keep your head from bending. 3. Hold for about 6 seconds. 4. Repeat 8 to 12 times. Chin tuck 1. Lie on the floor with a rolled-up towel under your neck. Your head should be touching the floor. 2. Slowly bring your chin toward your chest. 
3. Hold for a count of 6, and then relax for up to 10 seconds. 4. Repeat 8 to 12 times. Follow-up care is a key part of your treatment and safety. Be sure to make and go to all appointments, and call your doctor if you are having problems. It's also a good idea to know your test results and keep a list of the medicines you take. Where can you learn more? Go to http://sherry-moon.info/. Enter M679 in the search box to learn more about \"Neck Strain or Sprain: Rehab Exercises. \" Current as of: March 21, 2017 Content Version: 11.3 © 5660-6407 [employer]. Care instructions adapted under license by [employer] (which disclaims liability or warranty for this information). If you have questions about a medical condition or this instruction, always ask your healthcare professional. [address] any warranty or liability for your use of this information. Neck: Exercises Your Care Instructions Here are some examples of typical rehabilitation exercises for your condition. Start each exercise slowly. Ease off the exercise if you start to have pain. Your doctor or physical therapist will tell you when you can start these exercises and which ones will work best for you. How to do the exercises Note: Stretching should make you feel a gentle stretch, but no pain. Stop any strengthening exercise that makes pain worse. Neck stretch 5. This stretch works best if you keep your shoulder down as you lean away from it. To help you remember to do this, start by relaxing your shoulders and lightly holding on to your thighs or your chair. 6. Tilt your head toward your shoulder and hold for 15 to 30 seconds. Let the weight of your head stretch your muscles. 7. If you would like a little added stretch, use your hand to gently and steadily pull your head toward your shoulder. For example, keeping your right shoulder down, lean your head to the left. 8. Repeat 2 to 4 times toward each shoulder. Diagonal neck stretch 6. Turn your head slightly toward the direction you will be stretching, and tilt your head diagonally toward your chest and hold for 15 to 30 seconds. 7. If you would like a little added stretch, use your hand to gently and steadily pull your head forward on the diagonal. 
8. Repeat 2 to 4 times toward each side. Dorsal glide stretch 5. Sit or stand tall and look straight ahead. 6. Slowly tuck your chin as you glide your head backward over your body 7. Hold for a count of 6, and then relax for up to 10 seconds. 8. Repeat 8 to 12 times. Note: The dorsal glide stretches the back of the neck. If you feel pain, do not glide so far back. Some people find this exercise easier to do while lying on their backs with an ice pack on the neck. Chest and shoulder stretch 6. Sit or stand tall and glide your head backward as in the dorsal glide stretch. 7. Raise both arms so that your hands are next to your ears. 8. Take a deep breath, and as you breathe out, lower your elbows down and behind your back. You will feel your shoulder blades slide down and together, and at the same time you will feel a stretch across your chest and the front of your shoulders. 9. Hold for about 6 seconds, and then relax for up to 10 seconds. 10. Repeat 8 to 12 times. Strengthening: Hands on head 5. Move your head backward, forward, and side to side against gentle pressure from your hands, holding each position for about 6 seconds. 6. Repeat 8 to 12 times. Follow-up care is a key part of your treatment and safety. Be sure to make and go to all appointments, and call your doctor if you are having problems. It's also a good idea to know your test results and keep a list of the medicines you take. Where can you learn more? Go to http://sherry-moon.info/. Enter P975 in the search box to learn more about \"Neck: Exercises. \" Current as of: March 21, 2017 Content Version: 11.3 © 4481-7473 [employer]. Care instructions adapted under license by [employer] (which disclaims liability or warranty for this information). If you have questions about a medical condition or this instruction, always ask your healthcare professional. [address] any warranty or liability for your use of this information. Introducing [facility]! [facility] introduces Deehubs patient portal. Now you can access parts of your medical record, email your doctor's office, and request medication refills online. 1. In your internet browser, go to [email or web address] 2. Click on the First Time User? Click Here link in the Sign In box. You will see the New Member Sign Up page. 3. Enter your Deehubs Access Code exactly as it appears below. You will not need to use this code after youve completed the sign-up process. If you do not sign up before the expiration date, you must request a new code. · Deehubs Access Code: [email or web address] Expires: 1/1/2018  5:26 PM 
 
4. Enter the last four digits of your Social Security Number (xxxx) and Date of Birth (mm/dd/yyyy) as indicated and click Submit. You will be taken to the next sign-up page. 5. Create a Deehubs ID. This will be your Deehubs login ID and cannot be changed, so think of one that is secure and easy to remember. 6. Create a Deehubs password. You can change your password at any time. 7. Enter your Password Reset Question and Answer. This can be used at a later time if you forget your password. 8. Enter your e-mail address. You will receive e-mail notification when new information is available in [address] 9. Click Sign Up. You can now view and download portions of your medical record. 10. Click the Download Summary menu link to download a portable copy of your medical information. If you have questions, please visit the Frequently Asked Questions section of the Deehubs website. Remember, Deehubs is NOT to be used for urgent needs. For medical emergencies, dial 911. Now available from your iPhone and Android! Please provide this summary of care documentation to your next provider. Your primary care clinician is listed as [address] Jina Izquierdo. If you have any questions after today's visit, please call [phone].

## 2017-10-04 ENCOUNTER — TELEPHONE (OUTPATIENT)
Dept: FAMILY MEDICINE CLINIC | Age: 52
End: 2017-10-04

## 2017-10-04 LAB
ALBUMIN SERPL-MCNC: 4.5 G/DL (ref 3.5–5.5)
ALBUMIN/GLOB SERPL: 1.4 {RATIO} (ref 1.2–2.2)
ALP SERPL-CCNC: 90 IU/L (ref 39–117)
ALT SERPL-CCNC: 10 IU/L (ref 0–32)
AST SERPL-CCNC: 14 IU/L (ref 0–40)
BILIRUB SERPL-MCNC: <0.2 MG/DL (ref 0–1.2)
BUN SERPL-MCNC: 11 MG/DL (ref 6–24)
BUN/CREAT SERPL: 19 (ref 9–23)
CALCIUM SERPL-MCNC: 9.7 MG/DL (ref 8.7–10.2)
CHLORIDE SERPL-SCNC: 101 MMOL/L (ref 96–106)
CHOLEST SERPL-MCNC: 471 MG/DL (ref 100–199)
CO2 SERPL-SCNC: 27 MMOL/L (ref 18–29)
CREAT SERPL-MCNC: 0.59 MG/DL (ref 0.57–1)
GLOBULIN SER CALC-MCNC: 3.3 G/DL (ref 1.5–4.5)
GLUCOSE SERPL-MCNC: 80 MG/DL (ref 65–99)
HDLC SERPL-MCNC: 45 MG/DL
INTERPRETATION, 910389: NORMAL
LDLC SERPL CALC-MCNC: 383 MG/DL (ref 0–99)
POTASSIUM SERPL-SCNC: 3.9 MMOL/L (ref 3.5–5.2)
PROT SERPL-MCNC: 7.8 G/DL (ref 6–8.5)
SODIUM SERPL-SCNC: 144 MMOL/L (ref 134–144)
TRIGL SERPL-MCNC: 214 MG/DL (ref 0–149)
VLDLC SERPL CALC-MCNC: 43 MG/DL (ref 5–40)

## 2017-10-04 NOTE — TELEPHONE ENCOUNTER
Called patient to inform to restart crestor medication due to elevated cholesterol. Patient state will start on the medication Friday.

## 2017-10-05 ENCOUNTER — HOSPITAL ENCOUNTER (OUTPATIENT)
Dept: MAMMOGRAPHY | Age: 52
Discharge: HOME | End: 2017-10-05
Attending: FAMILY MEDICINE
Payer: COMMERCIAL

## 2017-10-05 DIAGNOSIS — Z12.31 VISIT FOR SCREENING MAMMOGRAM: ICD-10-CM

## 2017-10-05 PROCEDURE — 77067 SCR MAMMO BI INCL CAD: CPT

## 2017-10-17 ENCOUNTER — HOSPITAL ENCOUNTER (OUTPATIENT)
Dept: ULTRASOUND IMAGING | Age: 52
Discharge: HOME | End: 2017-10-17
Payer: COMMERCIAL

## 2017-10-17 ENCOUNTER — HOSPITAL ENCOUNTER (OUTPATIENT)
Dept: MAMMOGRAPHY | Age: 52
Discharge: HOME | End: 2017-10-17
Payer: COMMERCIAL

## 2017-10-17 DIAGNOSIS — R92.8 ABNORMAL MAMMOGRAM: ICD-10-CM

## 2017-10-17 DIAGNOSIS — R92.8 ABNORMAL MAMMOGRAM OF BOTH BREASTS: ICD-10-CM

## 2017-10-17 PROCEDURE — 76642 ULTRASOUND BREAST LIMITED: CPT

## 2017-11-07 ENCOUNTER — TELEPHONE (OUTPATIENT)
Dept: FAMILY MEDICINE CLINIC | Age: 52
End: 2017-11-07

## 2017-11-07 DIAGNOSIS — I65.23 BILATERAL CAROTID ARTERY STENOSIS: Primary | ICD-10-CM

## 2017-11-07 DIAGNOSIS — R42 DIZZINESS: ICD-10-CM

## 2017-11-07 NOTE — TELEPHONE ENCOUNTER
Patient called stating that she seen Hero Dempsey a month ago about some pain in her neck. Hero Dempsey said that if it continued patient may need to have a doppler done. Patient would like to know if she needs to come back in for a follow up or if the doppler can just be scheduled.  Please call her at [phone]

## 2017-11-08 NOTE — TELEPHONE ENCOUNTER
Patient with known carotid stenosis bilaterally. (cartoid doppler in May 2013 - 1. Bilateral mild 16-49% stenosis of the internal carotid arteries. 2. No significant stenosis in the external carotid arteries bilaterally.)    Patient with complaints of dizziness. Needs updated carotid dopplers. Order placed in [facility] care.

## 2017-11-17 ENCOUNTER — HOSPITAL ENCOUNTER (OUTPATIENT)
Dept: VASCULAR SURGERY | Age: 52
Discharge: HOME | End: 2017-11-17
Payer: COMMERCIAL

## 2017-11-17 DIAGNOSIS — R42 DIZZINESS: ICD-10-CM

## 2017-11-17 DIAGNOSIS — I65.23 BILATERAL CAROTID ARTERY STENOSIS: ICD-10-CM

## 2017-11-17 PROCEDURE — 93880 EXTRACRANIAL BILAT STUDY: CPT

## 2017-11-17 NOTE — PROCEDURES
[facility]  *** FINAL REPORT ***    Name: Suhail Kay  MRN: [id number]    Outpatient  : 1965  HIS Order #: [id number]  [id number] [facility] Visit #: [id number]  Date: 2017    TYPE OF TEST: Cerebrovascular Duplex    REASON FOR TEST  Dizziness/vertigo, Carotid stenosis    Right Carotid:-             Proximal               Mid                 Distal  cm/s  Systolic  Diastolic  Systolic  Diastolic  Systolic  Diastolic  CCA:    076.6      12.0                           106.0      20.0  Bulb:  ECA:    162.0       0.0  ICA:    104.0      18.0      106.0      28.0       96.0      20.0  ICA/CCA:  1.0       0.9    ICA Stenosis: <50%    Right Vertebral:-  Finding: Antegrade  Sys:       57.0  Yulissa:       12.0    Right Subclavian: Normal    Left Carotid:-            Proximal                Mid                 Distal  cm/s  Systolic  Diastolic  Systolic  Diastolic  Systolic  Diastolic  CCA:    194.9      14.0                           115.0      19.0  Bulb:  ECA:    192.0      12.0  ICA:     96.0      21.0      100.0      28.0       81.0      22.0  ICA/CCA:  0.8       1.1    ICA Stenosis: <50%    Left Vertebral:-  Finding: Antegrade  Sys:       58.0  Yulissa:       12.0    Left Subclavian: Normal    INTERPRETATION/FINDINGS  PROCEDURE:  Carotid Duplex Examination using B-mode, color and  spectral Doppler of the extracranial cerebrovascular arteries. 1. Bilateral <50% stenosis of the internal carotid arteries. 2. No significant stenosis in the external carotid arteries  bilaterally. 3. Antegrade flow in both vertebral arteries. 4. Normal flow in both subclavian arteries. Plaque Morphology:  1. Heterogeneous plaque in the bulb and right ICA. 2. Heterogeneous plaque in the bulb and left ICA. ADDITIONAL COMMENTS    I have personally reviewed the data relevant to the interpretation of  this  study.     TECHNOLOGIST: Henrietta Escobedo RVT  Signed: 2017 11:06 AM    PHYSICIAN: Kumar Presley MD  Signed: 11/21/2017 09:02 AM

## 2017-11-17 NOTE — PROGRESS NOTES
[facility]ascular  Preliminary Report:  Carotid Duplex Scan    Right:  Mild plaque noted in the right carotid system. Right ICA velocities suggest less than 50% diameter reduction. Right vertebral artery flow is antegrade. Left:  Mild plaque noted in the left carotid system. Left ICA velocities suggest less than 50% diameter reduction. Left vertebral artery flow is antegrade. Final report to follow.

## 2018-02-01 ENCOUNTER — OFFICE VISIT (OUTPATIENT)
Dept: FAMILY MEDICINE CLINIC | Age: 53
End: 2018-02-01

## 2018-02-01 VITALS
SYSTOLIC BLOOD PRESSURE: 140 MMHG | DIASTOLIC BLOOD PRESSURE: 61 MMHG | RESPIRATION RATE: 18 BRPM | HEIGHT: 61 IN | HEART RATE: 94 BPM | OXYGEN SATURATION: 99 % | BODY MASS INDEX: 23.79 KG/M2 | TEMPERATURE: 98.6 F | WEIGHT: 126 LBS

## 2018-02-01 DIAGNOSIS — E78.5 DYSLIPIDEMIA, GOAL LDL BELOW 100: Chronic | ICD-10-CM

## 2018-02-01 DIAGNOSIS — G60.0 CMT (CHARCOT-MARIE-TOOTH DISEASE): Chronic | ICD-10-CM

## 2018-02-01 DIAGNOSIS — M54.2 NECK PAIN: Primary | ICD-10-CM

## 2018-02-01 DIAGNOSIS — M06.9 RHEUMATOID ARTHRITIS INVOLVING MULTIPLE SITES, UNSPECIFIED RHEUMATOID FACTOR PRESENCE: Chronic | ICD-10-CM

## 2018-02-01 LAB
BACTERIA UA POCT, BACTPOCT: NORMAL
BILIRUB UR QL STRIP: NEGATIVE
CASTS UA POCT: NEGATIVE
CLUE CELLS, CLUEPOCT: NEGATIVE
CRYSTALS UA POCT, CRYSPOCT: NEGATIVE
EPITHELIAL CELLS POCT: NORMAL
GLUCOSE UR-MCNC: NEGATIVE MG/DL
KETONES P FAST UR STRIP-MCNC: NEGATIVE MG/DL
MUCUS UA POCT, MUCPOCT: NORMAL
PH UR STRIP: 7 [PH] (ref 4.6–8)
PROT UR QL STRIP: NORMAL
RBC UA POCT, RBCPOCT: NORMAL
SP GR UR STRIP: 1.01 (ref 1–1.03)
TRICH UA POCT, TRICHPOC: NEGATIVE
UA UROBILINOGEN AMB POC: NORMAL (ref 0.2–1)
URINALYSIS CLARITY POC: CLEAR
URINALYSIS COLOR POC: YELLOW
URINE BLOOD POC: NORMAL
URINE CULT COMMENT, POCT: NORMAL
URINE LEUKOCYTES POC: NORMAL
URINE NITRITES POC: NEGATIVE
WBC UA POCT, WBCPOCT: NORMAL
YEAST UA POCT, YEASTPOC: NEGATIVE

## 2018-02-01 RX ORDER — METHOCARBAMOL 500 MG/1
500 TABLET, FILM COATED ORAL 3 TIMES DAILY
Qty: 60 TAB | Refills: 1 | Status: SHIPPED | OUTPATIENT
Start: 2018-02-01 | End: 2018-04-27 | Stop reason: SDUPTHER

## 2018-02-01 NOTE — MR AVS SNAPSHOT
[address] [facility] [address] 
 
 
 [address] [facility] [place] [address] [address] 
[phone] Patient: Jignesh Bhat MRN: [id number] :[id number] Visit Information Date & Time Provider Department Dept. Phone Encounter #  
 2018  8:30 AM Shannan Harper NP [facility] [phone] [id number] Follow-up Instructions Return if symptoms worsen or fail to improve. Your Appointments 2018  7:30 AM  
COMPLETE PHYSICAL with Shannan Harper NP [facility]) Appt Note: CPE  
 [address] [facility] [place] [phone] [id number] [phone] [address] [facility] [address] Upcoming Health Maintenance Date Due Influenza Age 5 to Adult 2017 PAP AKA CERVICAL CYTOLOGY 2018 BREAST CANCER SCRN MAMMOGRAM 10/5/2019 DTaP/Tdap/Td series (2 - Td) 2022 COLONOSCOPY 2026 Allergies as of 2018  Review Complete On: 2018 By: Ashley Baptiste LPN Severity Noted Reaction Type Reactions Flomax [Tamsulosin] High 2011    Other (comments) Severe headache Percocet [Oxycodone-acetaminophen] High 10/22/2010    Nausea and Vomiting, Other (comments)  
 dizziness Lipitor [Atorvastatin] Medium 2013   Side Effect Other (comments) Dizzy and nausea Zetia [Ezetimibe] Medium 12/10/2010   Side Effect Other (comments)  
 headaches  
 Gabapentin  03/15/2010    Nausea and Vomiting  
 dizziness Naproxen  2018    Nausea and Vomiting Prednisone  03/15/2010    Nausea and Vomiting Current Immunizations  Reviewed on 2015 Name Date Influenza Vaccine 2014  4:43 PM, 2013, 2013 TDAP Vaccine 2012 Not reviewed this visit You Were Diagnosed With   
  
 Codes Comments Neck pain    -  Primary ICD-10-CM: M54.2 ICD-9-CM: 723.1 Dyslipidemia, goal LDL below 100     ICD-10-CM: E78.5 ICD-9-CM: 272.4 CMT (Charcot-Louise-Tooth disease)     ICD-10-CM: G60.0 ICD-9-CM: 356.1 Rheumatoid arthritis involving multiple sites, unspecified [facility]     ICD-10-CM: M06.9 ICD-9-CM: 714.0 Vitals BP Pulse Temp Resp Height(growth percentile) Weight(growth percentile) 140/61 (BP 1 Location: Right arm, BP Patient Position: Sitting) 94 98.6 °F (37 °C) (Oral) 18 5' 1\" (1.549 m) 126 lb (57.2 kg) SpO2 BMI OB Status Smoking Status 99% 23.81 kg/m2 Needs review Never Smoker BMI and BSA Data Body Mass Index Body Surface Area  
 23.81 kg/m 2 1.57 m 2 Preferred Pharmacy Pharmacy Name Phone [id number] [address] [phone] Your Updated Medication List  
  
   
This list is accurate as of: 2/1/18  8:59 AM.  Always use your most recent med list.  
  
  
  
  
 calcium citrate-vitamin D3 tablet Commonly known as:  CITRACAL WITH VITAMIN D MAXIMUM Take 1 Tab by mouth two (2) times a day. folic acid 1 mg tablet Commonly known as:  Google Take 1 mg by mouth daily. HYDROcodone-acetaminophen 5-325 mg per tablet Commonly known as:  Sarika Chain Take 1 Tab by mouth every six (6) hours as needed for Pain. Max Daily Amount: 4 Tabs. methocarbamol 500 mg tablet Commonly known as:  ROBAXIN Take 1 Tab by mouth three (3) times daily. As needed for neck pain  
  
 methotrexate 2.5 mg tablet Commonly known as:  RHEUMATREX  
2.5 mg Every Friday. naproxen 500 mg tablet Commonly known as:  NAPROSYN Take 1 Tab by mouth two (2) times daily (with meals). As needed for neck pain  
  
 rosuvastatin 40 mg tablet Commonly known as:  CRESTOR Take 1 Tab by mouth nightly. traMADol 50 mg tablet Commonly known as:  ULTRAM  
TAKE 1 TABLET BY MOUTH EVERY 8 HOURS AS NEEDED FOR PAIN, MAXIMUM 3 TABLETS PER DAY. VITAMIN D3 1,000 unit tablet Generic drug:  cholecalciferol Take 1,000 Units by mouth daily. Prescriptions Sent to Pharmacy Refills  
 methocarbamol (ROBAXIN) 500 mg tablet 1 Sig: Take 1 Tab by mouth three (3) times daily. As needed for neck pain  
 Class: Normal  
 Pharmacy: [facility]/pharmacy [address] [facility] [address]  #: [phone] Route: Oral  
  
We Performed the Following AMB POC URINALYSIS DIP STICK AUTO W/ MICRO  [07791 CPT(R)] CBC WITH AUTOMATED DIFF [53627 CPT(R)] LIPID PANEL [45152 CPT(R)] METABOLIC PANEL, COMPREHENSIVE [26938 CPT(R)] VITAMIN D, 25 HYDROXY [id number] CPT(R)] Follow-up Instructions Return if symptoms worsen or fail to improve. Patient Instructions Neck: Exercises Your Care Instructions Here are some examples of typical rehabilitation exercises for your condition. Start each exercise slowly. Ease off the exercise if you start to have pain. Your doctor or physical therapist will tell you when you can start these exercises and which ones will work best for you. How to do the exercises Neck stretch 1. This stretch works best if you keep your shoulder down as you lean away from it. To help you remember to do this, start by relaxing your shoulders and lightly holding on to your thighs or your chair. 2. Tilt your head toward your shoulder and hold for 15 to 30 seconds. Let the weight of your head stretch your muscles. 3. If you would like a little added stretch, use your hand to gently and steadily pull your head toward your shoulder. For example, keeping your right shoulder down, lean your head to the left. 4. Repeat 2 to 4 times toward each shoulder. Diagonal neck stretch 1. Turn your head slightly toward the direction you will be stretching, and tilt your head diagonally toward your chest and hold for 15 to 30 seconds.  
2. If you would like a little added stretch, use your hand to gently and steadily pull your head forward on the diagonal. 
3. Repeat 2 to 4 times toward each side. Dorsal glide stretch The dorsal glide stretches the back of the neck. If you feel pain, do not glide so far back. Some people find this exercise easier to do while lying on their backs with an ice pack on the neck. 1. Sit or stand tall and look straight ahead. 2. Slowly tuck your chin as you glide your head backward over your body 3. Hold for a count of 6, and then relax for up to 10 seconds. 4. Repeat 8 to 12 times. Chest and shoulder stretch 1. Sit or stand tall and glide your head backward as in the dorsal glide stretch. 2. Raise both arms so that your hands are next to your ears. 3. Take a deep breath, and as you breathe out, lower your elbows down and behind your back. You will feel your shoulder blades slide down and together, and at the same time you will feel a stretch across your chest and the front of your shoulders. 4. Hold for about 6 seconds, and then relax for up to 10 seconds. 5. Repeat 8 to 12 times. Strengthening: Hands on head 1. Move your head backward, forward, and side to side against gentle pressure from your hands, holding each position for about 6 seconds. 2. Repeat 8 to 12 times. Follow-up care is a key part of your treatment and safety. Be sure to make and go to all appointments, and call your doctor if you are having problems. It's also a good idea to know your test results and keep a list of the medicines you take. Where can you learn more? Go to http://sherry-moon.info/. Enter P975 in the search box to learn more about \"Neck: Exercises. \" Current as of: March 21, 2017 Content Version: 11.4 © 6707-0518 Healthwise, Incorporated. Care instructions adapted under license by [employer] (which disclaims liability or warranty for this information).  If you have questions about a medical condition or this instruction, always ask your healthcare professional. [place] [address] any warranty or liability for your use of this information. Introducing [facility] [place] [facility]! Nora Walker introduces [employer] patient portal. Now you can access parts of your medical record, email your doctor's office, and request medication refills online. 1. In your internet browser, go to [email or web address] 2. Click on the First Time User? Click Here link in the Sign In box. You will see the New Member Sign Up page. 3. Enter your [employer] Access Code exactly as it appears below. You will not need to use this code after youve completed the sign-up process. If you do not sign up before the expiration date, you must request a new code. · [employer] Access Code: [email or web address] Expires: 5/2/2018  8:59 AM 
 
4. Enter the last four digits of your Social Security Number (xxxx) and Date of Birth (mm/dd/yyyy) as indicated and click Submit. You will be taken to the next sign-up page. 5. Create a Act[employer]is ID. This will be your Act[employer]is login ID and cannot be changed, so think of one that is secure and easy to remember. 6. Create a [employer] password. You can change your password at any time. 7. Enter your Password Reset Question and Answer. This can be used at a later time if you forget your password. 8. Enter your e-mail address. You will receive e-mail notification when new information is available in [address] 9. Click Sign Up. You can now view and download portions of your medical record. 10. Click the Download Summary menu link to download a portable copy of your medical information. If you have questions, please visit the Frequently Asked Questions section of the [employer] website. Remember, [employer] is NOT to be used for urgent needs. For medical emergencies, dial 911. Now available from your iPhone and Android! Please provide this summary of care documentation to your next provider. Your primary care clinician is listed as Via iJna Izquierdo. If you have any questions after today's visit, please call [phone].

## 2018-02-01 NOTE — PROGRESS NOTES
Chief Complaint   Patient presents with    Neck Pain     x 1 week     Wants to discuss medication. Her Crestor is no longer covered. Needs alternative. Pt has had flu vaccination from work in October 2017. Pavel. Pt has been laid off.

## 2018-02-01 NOTE — PATIENT INSTRUCTIONS

## 2018-02-01 NOTE — PROGRESS NOTES
HISTORY OF PRESENT ILLNESS  Reginaldo No is a 47 y.o. female. HPI  Patient comes in today for neck pain. Complains of neck pain for 1 week. Has had similar neck pain off and on for several months. States neck pain went away for a while. Feel like in middle of neck and upper. States pain causes her to feel lightheaded. Prednisone helped some but once stop medicine, pain returns. No headaches, numbness or tingling, extremity weakness. Notices more when she is leaning over looking at phone. Muscle relaxer seemed to help some. Naprosyn makes her nauseated, stomach upset. Robaxin helped. Has not injured neck that she can recall, but has been playing facebook on phone and moving furniture. Has been taking Aleve with some relief  Allergies   Allergen Reactions    Flomax [Tamsulosin] Other (comments)     Severe headache    Percocet [Oxycodone-Acetaminophen] Nausea and Vomiting and Other (comments)     dizziness    Lipitor [Atorvastatin] Other (comments)     Dizzy and nausea    Zetia [Ezetimibe] Other (comments)     headaches    Gabapentin Nausea and Vomiting     dizziness    Naproxen Nausea and Vomiting    Prednisone Nausea and Vomiting       Past Medical History:   Diagnosis Date    Aortic valvar stenosis 2/23/2015    Calculus of kidney     Carotid stenosis, bilateral     CMT (Charcot Louise Tooth) disease     CTS (carpal tunnel syndrome)     High risk for fracture due to osteoporosis by DEXA scan 2/23/2015    Hypercholesteremia 3/15/2010    Osteoporosis 9/27/2014    RA (rheumatoid arthritis) ([facility] [address] 3/15/2010       Past Surgical History:   Procedure Laterality Date    HX GYN      BTL    RENAL SCOPE,REMV FB/STONE         Social History     Social History    Marital status:      Spouse name: N/A    Number of children: N/A    Years of education: N/A     Occupational History    Not on file.      Social History Main Topics    Smoking status: Never Smoker    Smokeless tobacco: Never Used  Alcohol use No    Drug use: No    Sexual activity: Not on file     Other Topics Concern    Not on file     Social History Narrative       Family History   Problem Relation Age of Onset    Thyroid Disease Mother     Elevated Lipids Mother     Heart Disease Mother     Cancer Maternal Aunt      breast    Breast Cancer Maternal Aunt     Cancer Maternal Grandmother      throat/was snuff user       Current Outpatient Prescriptions   Medication Sig    rosuvastatin (CRESTOR) 40 mg tablet Take 1 Tab by mouth nightly.  methotrexate (RHEUMATREX) 2.5 mg tablet 2.5 mg Every Friday.  folic acid (FOLVITE) 1 mg tablet Take 1 mg by mouth daily.  methocarbamol (ROBAXIN) 500 mg tablet Take 1 Tab by mouth three (3) times daily. As needed for neck pain    naproxen (NAPROSYN) 500 mg tablet Take 1 Tab by mouth two (2) times daily (with meals). As needed for neck pain    traMADol (ULTRAM) 50 mg tablet TAKE 1 TABLET BY MOUTH EVERY 8 HOURS AS NEEDED FOR PAIN, MAXIMUM 3 TABLETS PER DAY.  HYDROcodone-acetaminophen (NORCO) 5-325 mg per tablet Take 1 Tab by mouth every six (6) hours as needed for Pain. Max Daily Amount: 4 Tabs.  calcium citrate-vitamin D3 (CITRACAL WITH VITAMIN D MAXIMUM) tablet Take 1 Tab by mouth two (2) times a day.  cholecalciferol (VITAMIN D3) 1,000 unit tablet Take 1,000 Units by mouth daily. No current facility-administered medications for this visit. Review of Systems   Constitutional: Negative. Respiratory: Negative. Cardiovascular: Negative. Musculoskeletal: Positive for joint pain (hx RA) and neck pain. Skin: Negative. Neurological: Negative for dizziness, tingling, sensory change, focal weakness and headaches. Vitals:    02/01/18 0828   BP: 140/61   Pulse: 94   Resp: 18   Temp: 98.6 °F (37 °C)   TempSrc: Oral   SpO2: 99%   Weight: 126 lb (57.2 kg)   Height: 5' 1\" (1.549 m)     Physical Exam   Constitutional: She is oriented to person, place, and time.  Vital signs are normal. She appears well-developed and well-nourished. She is cooperative. Neck: Muscular tenderness (left trapezius) present. No spinous process tenderness present. Decreased range of motion present. No erythema present. Cardiovascular: Normal rate, regular rhythm and normal heart sounds. Pulses:       Radial pulses are 2+ on the right side, and 2+ on the left side. Pulmonary/Chest: Effort normal and breath sounds normal.   Musculoskeletal:   Muscle strength 5/5 BUEs, sensation/NV intact   Neurological: She is alert and oriented to person, place, and time. Skin: Skin is warm and dry. Psychiatric: She has a normal mood and affect. Her behavior is normal. Judgment and thought content normal.   Vitals reviewed. ASSESSMENT and PLAN    ICD-10-CM ICD-9-CM    1. Neck pain M54.2 723.1 methocarbamol (ROBAXIN) 500 mg tablet   2. Dyslipidemia, goal LDL below 100 E78.5 272.4 CBC WITH AUTOMATED DIFF      LIPID PANEL      METABOLIC PANEL, COMPREHENSIVE      AMB POC URINALYSIS DIP STICK AUTO W/ MICRO    3. CMT (Charcot-Louise-Tooth disease) G60.0 356.1 VITAMIN D, 25 HYDROXY   4. Rheumatoid arthritis involving multiple sites, unspecified rheumatoid factor presence ([facility] [address] M06.9 714.0 VITAMIN D, 25 HYDROXY     Encounter Diagnoses   Name Primary?  Neck pain Yes    Dyslipidemia, goal LDL below 100     CMT (Charcot-Louise-Tooth disease)     Rheumatoid arthritis involving multiple sites, unspecified rheumatoid factor presence ([facility] [address]      Orders Placed This Encounter    CBC WITH AUTOMATED DIFF    LIPID PANEL    VITAMIN D, 25 HYDROXY    METABOLIC PANEL, COMPREHENSIVE    AMB POC URINALYSIS DIP STICK AUTO W/ MICRO     methocarbamol (ROBAXIN) 500 mg tablet     Diagnoses and all orders for this visit:    1. Neck pain - refilled robaxin, encouraged patient to use heat, perform HEP. If no improvement, will consider outpt PT  -     methocarbamol (ROBAXIN) 500 mg tablet;  Take 1 Tab by mouth three (3) times daily. As needed for neck pain    2. Dyslipidemia, goal LDL below 100 - labs ordered for CPE next week  -     CBC WITH AUTOMATED DIFF  -     LIPID PANEL  -     METABOLIC PANEL, COMPREHENSIVE  -     AMB POC URINALYSIS DIP STICK AUTO W/ MICRO     3. CMT (Charcot-Louise-Tooth disease) - labs ordered for CPE next week  -     VITAMIN D, 25 HYDROXY    4. Rheumatoid arthritis involving multiple sites, unspecified rheumatoid factor presence ([facility] [address] - labs ordered for CPE next week. -     VITAMIN D, 25 HYDROXY      Follow-up Disposition:  Return if symptoms worsen or fail to improve. I have reviewed the patient's allergies and made any necessary changes. Medical, procedural, social and family histories have been reviewed and updated as medically indicated. I have reconciled and/or revised patient medications in the EMR. I have discussed each diagnosis listed in this note with Vamsi Cohen and/or their family. I have discussed treatment options and the risk/benefit analysis of those options, including safe use of medications and possible medication side effects. Through the use of shared decision making we have agreed to the above plan. The patient has received an after-visit summary and questions were answered concerning future plans. Sylvie Monroy, FNP-C  This note will not be viewable in Case[employer].

## 2018-02-02 LAB
25(OH)D3+25(OH)D2 SERPL-MCNC: 28.3 NG/ML (ref 30–100)
ALBUMIN SERPL-MCNC: 4.7 G/DL (ref 3.5–5.5)
ALBUMIN/GLOB SERPL: 1.6 {RATIO} (ref 1.2–2.2)
ALP SERPL-CCNC: 90 IU/L (ref 39–117)
ALT SERPL-CCNC: 15 IU/L (ref 0–32)
AST SERPL-CCNC: 20 IU/L (ref 0–40)
BASOPHILS # BLD AUTO: 0 X10E3/UL (ref 0–0.2)
BASOPHILS NFR BLD AUTO: 0 %
BILIRUB SERPL-MCNC: 0.5 MG/DL (ref 0–1.2)
BUN SERPL-MCNC: 15 MG/DL (ref 6–24)
BUN/CREAT SERPL: 23 (ref 9–23)
CALCIUM SERPL-MCNC: 9.5 MG/DL (ref 8.7–10.2)
CHLORIDE SERPL-SCNC: 101 MMOL/L (ref 96–106)
CHOLEST SERPL-MCNC: 261 MG/DL (ref 100–199)
CO2 SERPL-SCNC: 23 MMOL/L (ref 18–29)
CREAT SERPL-MCNC: 0.66 MG/DL (ref 0.57–1)
EOSINOPHIL # BLD AUTO: 0 X10E3/UL (ref 0–0.4)
EOSINOPHIL NFR BLD AUTO: 1 %
ERYTHROCYTE [DISTWIDTH] IN BLOOD BY AUTOMATED COUNT: 15.8 % (ref 12.3–15.4)
GFR SERPLBLD CREATININE-BSD FMLA CKD-EPI: 103 ML/MIN/1.73
GFR SERPLBLD CREATININE-BSD FMLA CKD-EPI: 118 ML/MIN/1.73
GLOBULIN SER CALC-MCNC: 2.9 G/DL (ref 1.5–4.5)
GLUCOSE SERPL-MCNC: 101 MG/DL (ref 65–99)
HCT VFR BLD AUTO: 43.7 % (ref 34–46.6)
HDLC SERPL-MCNC: 49 MG/DL
HGB BLD-MCNC: 13.8 G/DL (ref 11.1–15.9)
IMM GRANULOCYTES # BLD: 0 X10E3/UL (ref 0–0.1)
IMM GRANULOCYTES NFR BLD: 0 %
INTERPRETATION, 910389: NORMAL
LDLC SERPL CALC-MCNC: 192 MG/DL (ref 0–99)
LYMPHOCYTES # BLD AUTO: 1.6 X10E3/UL (ref 0.7–3.1)
LYMPHOCYTES NFR BLD AUTO: 25 %
MCH RBC QN AUTO: 30.5 PG (ref 26.6–33)
MCHC RBC AUTO-ENTMCNC: 31.6 G/DL (ref 31.5–35.7)
MCV RBC AUTO: 97 FL (ref 79–97)
MONOCYTES # BLD AUTO: 0.4 X10E3/UL (ref 0.1–0.9)
MONOCYTES NFR BLD AUTO: 6 %
NEUTROPHILS # BLD AUTO: 4.5 X10E3/UL (ref 1.4–7)
NEUTROPHILS NFR BLD AUTO: 68 %
PLATELET # BLD AUTO: 302 X10E3/UL (ref 150–379)
POTASSIUM SERPL-SCNC: 4.3 MMOL/L (ref 3.5–5.2)
PROT SERPL-MCNC: 7.6 G/DL (ref 6–8.5)
RBC # BLD AUTO: 4.52 X10E6/UL (ref 3.77–5.28)
SODIUM SERPL-SCNC: 144 MMOL/L (ref 134–144)
TRIGL SERPL-MCNC: 101 MG/DL (ref 0–149)
VLDLC SERPL CALC-MCNC: 20 MG/DL (ref 5–40)
WBC # BLD AUTO: 6.5 X10E3/UL (ref 3.4–10.8)

## 2018-02-07 ENCOUNTER — OFFICE VISIT (OUTPATIENT)
Dept: FAMILY MEDICINE CLINIC | Age: 53
End: 2018-02-07

## 2018-02-07 VITALS
BODY MASS INDEX: 23.98 KG/M2 | HEART RATE: 85 BPM | RESPIRATION RATE: 18 BRPM | TEMPERATURE: 97.8 F | SYSTOLIC BLOOD PRESSURE: 130 MMHG | WEIGHT: 127 LBS | OXYGEN SATURATION: 100 % | HEIGHT: 61 IN | DIASTOLIC BLOOD PRESSURE: 62 MMHG

## 2018-02-07 DIAGNOSIS — I65.23 CAROTID STENOSIS, BILATERAL: Chronic | ICD-10-CM

## 2018-02-07 DIAGNOSIS — E78.5 DYSLIPIDEMIA, GOAL LDL BELOW 100: Chronic | ICD-10-CM

## 2018-02-07 DIAGNOSIS — M81.0 OSTEOPOROSIS, UNSPECIFIED OSTEOPOROSIS TYPE, UNSPECIFIED PATHOLOGICAL FRACTURE PRESENCE: Chronic | ICD-10-CM

## 2018-02-07 DIAGNOSIS — I10 ESSENTIAL HYPERTENSION, BENIGN: Chronic | ICD-10-CM

## 2018-02-07 DIAGNOSIS — M06.9 RHEUMATOID ARTHRITIS INVOLVING MULTIPLE SITES, UNSPECIFIED RHEUMATOID FACTOR PRESENCE: Chronic | ICD-10-CM

## 2018-02-07 DIAGNOSIS — G60.0 CMT (CHARCOT-MARIE-TOOTH DISEASE): Chronic | ICD-10-CM

## 2018-02-07 DIAGNOSIS — Z00.00 ROUTINE GENERAL MEDICAL EXAMINATION AT A HEALTH CARE FACILITY: Primary | ICD-10-CM

## 2018-02-07 DIAGNOSIS — L30.9 DERMATITIS: ICD-10-CM

## 2018-02-07 RX ORDER — TRIAMCINOLONE ACETONIDE 1 MG/G
CREAM TOPICAL 2 TIMES DAILY
Qty: 45 G | Refills: 0 | Status: SHIPPED | OUTPATIENT
Start: 2018-02-07 | End: 2019-04-19 | Stop reason: ALTCHOICE

## 2018-02-07 NOTE — PROGRESS NOTES
HISTORY OF PRESENT ILLNESS  Anna Marie Fine is a 47 y.o. female. HPI  Patient comes in today for CPE and to review labs from last week. Has been complaint with cholesterol medications. Tries to follow low fat, low cholesterol diet. Seeing rheumatology for RA. Due to see GYN - Dr. Kathleen Dandy. No hx of abnormal pap. Complains of rash on left lateral heel. States has been itching. Wasn't sure if related to lotion she was using. Feet sweat some. No other rashes. Allergies   Allergen Reactions    Flomax [Tamsulosin] Other (comments)     Severe headache    Percocet [Oxycodone-Acetaminophen] Nausea and Vomiting and Other (comments)     dizziness    Lipitor [Atorvastatin] Other (comments)     Dizzy and nausea    Zetia [Ezetimibe] Other (comments)     headaches    Gabapentin Nausea and Vomiting     dizziness    Naproxen Nausea and Vomiting    Prednisone Nausea and Vomiting       Past Medical History:   Diagnosis Date    Aortic valvar stenosis 2/23/2015    Calculus of kidney     Carotid stenosis, bilateral     CMT (Charcot Louise Tooth) disease     CTS (carpal tunnel syndrome)     High risk for fracture due to osteoporosis by DEXA scan 2/23/2015    Hypercholesteremia 3/15/2010    Osteoporosis 9/27/2014    RA (rheumatoid arthritis) ([facility] [address] 3/15/2010       Past Surgical History:   Procedure Laterality Date    HX GYN      BTL    RENAL SCOPE,REMV FB/STONE         Social History     Social History    Marital status:      Spouse name: N/A    Number of children: N/A    Years of education: N/A     Occupational History    Not on file.      Social History Main Topics    Smoking status: Never Smoker    Smokeless tobacco: Never Used    Alcohol use No    Drug use: No    Sexual activity: Not on file     Other Topics Concern    Not on file     Social History Narrative       Family History   Problem Relation Age of Onset    Thyroid Disease Mother     Elevated Lipids Mother     Heart Disease Mother    Yola To Cancer Maternal Aunt      breast    Breast Cancer Maternal Aunt     Cancer Maternal Grandmother      throat/was snuff user       Current Outpatient Prescriptions   Medication Sig    methocarbamol (ROBAXIN) 500 mg tablet Take 1 Tab by mouth three (3) times daily. As needed for neck pain    rosuvastatin (CRESTOR) 40 mg tablet Take 1 Tab by mouth nightly.  naproxen (NAPROSYN) 500 mg tablet Take 1 Tab by mouth two (2) times daily (with meals). As needed for neck pain    traMADol (ULTRAM) 50 mg tablet TAKE 1 TABLET BY MOUTH EVERY 8 HOURS AS NEEDED FOR PAIN, MAXIMUM 3 TABLETS PER DAY.  HYDROcodone-acetaminophen (NORCO) 5-325 mg per tablet Take 1 Tab by mouth every six (6) hours as needed for Pain. Max Daily Amount: 4 Tabs.  methotrexate (RHEUMATREX) 2.5 mg tablet 2.5 mg Every Friday.  folic acid (FOLVITE) 1 mg tablet Take 1 mg by mouth daily.  calcium citrate-vitamin D3 (CITRACAL WITH VITAMIN D MAXIMUM) tablet Take 1 Tab by mouth two (2) times a day.  cholecalciferol (VITAMIN D3) 1,000 unit tablet Take 1,000 Units by mouth daily. No current facility-administered medications for this visit. Review of Systems   Constitutional: Negative for chills, diaphoresis, fever, malaise/fatigue and weight loss. HENT: Negative for congestion, ear pain, sore throat and tinnitus. Eyes: Negative for blurred vision and double vision. Respiratory: Negative for cough, sputum production, shortness of breath and wheezing. Cardiovascular: Negative for chest pain, palpitations and leg swelling. Gastrointestinal: Negative for abdominal pain, blood in stool, constipation, diarrhea, nausea and vomiting. Genitourinary: Negative for dysuria, flank pain, frequency, hematuria and urgency. Musculoskeletal: Negative for back pain, joint pain, myalgias and neck pain (improved). Skin: Positive for itching and rash (left lateral heel).    Neurological: Negative for dizziness, tingling, sensory change, speech change, focal weakness and headaches. Psychiatric/Behavioral: Negative for depression. The patient is not nervous/anxious and does not have insomnia. Vitals:    02/07/18 0736   BP: 130/62   Pulse: 85   Resp: 18   Temp: 97.8 °F (36.6 °C)   TempSrc: Oral   SpO2: 100%   Weight: 127 lb (57.6 kg)   Height: 5' 1\" (1.549 m)     Physical Exam   Constitutional: She is oriented to person, place, and time. Vital signs are normal. She appears well-developed and well-nourished. She is cooperative. HENT:   Right Ear: Hearing, tympanic membrane, external ear and ear canal normal.   Left Ear: Hearing, tympanic membrane, external ear and ear canal normal.   Nose: Nose normal. Right sinus exhibits no maxillary sinus tenderness and no frontal sinus tenderness. Left sinus exhibits no maxillary sinus tenderness and no frontal sinus tenderness. Mouth/Throat: Uvula is midline, oropharynx is clear and moist and mucous membranes are normal. Mucous membranes are not pale and not dry. No oropharyngeal exudate, posterior oropharyngeal edema or posterior oropharyngeal erythema. Neck: No thyroid mass and no thyromegaly present. Cardiovascular: Normal rate, regular rhythm, S1 normal and S2 normal.    Murmur heard. Systolic murmur is present   Pulses:       Radial pulses are 2+ on the right side, and 2+ on the left side. Dorsalis pedis pulses are 2+ on the right side, and 2+ on the left side. Posterior tibial pulses are 2+ on the right side, and 2+ on the left side. Pulmonary/Chest: Effort normal and breath sounds normal. She has no decreased breath sounds. She has no wheezes. She has no rhonchi. She has no rales. Abdominal: Soft. Normal appearance and bowel sounds are normal. There is no hepatosplenomegaly. There is no tenderness. There is no CVA tenderness.    Genitourinary:   Genitourinary Comments: GYN - deferred - done by GYN   Musculoskeletal:        Right hand: She exhibits decreased range of motion, tenderness, bony tenderness and deformity (rheumatoid nodules noted PIP and MCP joints). She exhibits normal capillary refill and no swelling. Normal sensation noted. Decreased strength noted. Left hand: She exhibits decreased range of motion, tenderness, bony tenderness and deformity (rheumatoid nodules noted PIP and MCP joints). She exhibits normal capillary refill and no swelling. Normal sensation noted. Decreased strength noted. Lymphadenopathy:        Head (right side): No submental, no submandibular, no tonsillar, no preauricular and no posterior auricular adenopathy present. Head (left side): No submental, no submandibular, no tonsillar, no preauricular and no posterior auricular adenopathy present. She has no cervical adenopathy. Right: No supraclavicular adenopathy present. Left: No supraclavicular adenopathy present. Neurological: She is alert and oriented to person, place, and time. Skin: Skin is warm, dry and intact. Psychiatric: She has a normal mood and affect. Her speech is normal and behavior is normal. Judgment and thought content normal.   Vitals reviewed. ASSESSMENT and PLAN    ICD-10-CM ICD-9-CM    1. Routine general medical examination at a health care facility Z00.00 V70.0 CANCELED: AMB POC URINALYSIS DIP STICK AUTO W/ MICRO    2. Dyslipidemia, goal LDL below 100 E78.5 272.4    3. Essential hypertension, benign I10 401.1    4. Dermatitis L30.9 692.9 triamcinolone acetonide (KENALOG) 0.1 % topical cream   5. Rheumatoid arthritis involving multiple sites, unspecified rheumatoid factor presence ([facility]) M06.9 714.0    6. CMT (Charcot-Louise-Tooth disease) G60.0 356.1    7. Osteoporosis, unspecified osteoporosis type, unspecified pathological fracture presence M81.0 733.00    8. Carotid stenosis, bilateral I65.23 433.10      433.30      Encounter Diagnoses   Name Primary?     Routine general medical examination at a health care facility Yes    Dyslipidemia, goal LDL below 100     Essential hypertension, benign     Dermatitis     Rheumatoid arthritis involving multiple sites, unspecified rheumatoid factor presence (HCC)     CMT (Charcot-Louise-Tooth disease)     Osteoporosis, unspecified osteoporosis type, unspecified pathological fracture presence     Carotid stenosis, bilateral      Orders Placed This Encounter    triamcinolone acetonide (KENALOG) 0.1 % topical cream     Diagnoses and all orders for this visit:    1. Routine general medical examination at a health care facility    2. Dyslipidemia, goal LDL below 100 - LDL improved, will recheck in 3 months. May consider Repatha. 3. Essential hypertension, benign 0- stable. No changes    4. Dermatitis  -     triamcinolone acetonide (KENALOG) 0.1 % topical cream; Apply  to affected area two (2) times a day. use thin layer    5. Rheumatoid arthritis involving multiple sites, unspecified rheumatoid factor presence ([facility] [address] - per rheumatology    6. CMT (Charcot-Louise-Tooth disease)    7. Osteoporosis, unspecified osteoporosis type, unspecified pathological fracture presence - DEXA scan 8/12/16 - This patient is osteopenic using the World Health Organization criteria As compared to the prior study, there has been a significant 4.9% increase  in the left hip and a significant 4.1% increase in the spine. 10 year probability of major osteoporotic fracture:  7.5%  10 year probability of hip fracture:  1.5%     8. Carotid stenosis, bilateral - continue ASA      Follow-up Disposition:  Return in about 3 months (around 5/7/2018). lab results and schedule of future lab studies reviewed with patient  reviewed diet, exercise and weight control  cardiovascular risk and specific lipid/LDL goals reviewed    I have reviewed the patient's allergies and made any necessary changes. Medical, procedural, social and family histories have been reviewed and updated as medically indicated.  I have reconciled and/or revised patient medications in the EMR. I have discussed each diagnosis listed in this note with Jignesh Bhat and/or their family. I have discussed treatment options and the risk/benefit analysis of those options, including safe use of medications and possible medication side effects. Through the use of shared decision making we have agreed to the above plan. The patient has received an after-visit summary and questions were answered concerning future plans. Sylvie Monroy, LIVEP-C    This note will not be viewable in [employer].

## 2018-02-07 NOTE — MR AVS SNAPSHOT
[facility] 
 
 
 [address] [id number] [phone] Patient: Vanessa Pond MRN: [id number] :[id number] Visit Information Date & Time Provider Department Dept. Phone Encounter #  
 2018  7:30 AM Israel Coleman [phone] [id number] Follow-up Instructions Return in about 3 months (around 2018). Upcoming Health Maintenance Date Due Influenza Age 5 to Adult 2017 PAP AKA CERVICAL CYTOLOGY 2018 BREAST CANCER SCRN MAMMOGRAM 10/5/2019 DTaP/Tdap/Td series (2 - Td) 2022 COLONOSCOPY 2026 Allergies as of 2018  Review Complete On: 2018 By: Miya Calzada LPN Severity Noted Reaction Type Reactions Flomax [Tamsulosin] High 2011    Other (comments) Severe headache Percocet [Oxycodone-acetaminophen] High 10/22/2010    Nausea and Vomiting, Other (comments)  
 dizziness Lipitor [Atorvastatin] Medium 2013   Side Effect Other (comments) Dizzy and nausea Zetia [Ezetimibe] Medium 12/10/2010   Side Effect Other (comments)  
 headaches  
 Gabapentin  03/15/2010    Nausea and Vomiting  
 dizziness Naproxen  2018    Nausea and Vomiting Prednisone  03/15/2010    Nausea and Vomiting Current Immunizations  Reviewed on 2015 Name Date Influenza Vaccine 2014  4:43 PM, 2013, 2013 TDAP Vaccine 2012 Not reviewed this visit You Were Diagnosed With   
  
 Codes Comments Routine general medical examination at a health care facility    -  Primary ICD-10-CM: Z00.00 ICD-9-CM: V70.0 Dermatitis     ICD-10-CM: L30.9 ICD-9-CM: 692.9 Vitals BP Pulse Temp Resp Height(growth percentile) Weight(growth percentile) 130/62 (BP 1 Location: Right arm, BP Patient Position: Sitting) 85 97.8 °F (36.6 °C) (Oral) 18 5' 1\" (1.549 m) 127 lb (57.6 kg) SpO2 BMI OB Status Smoking Status 100% 24 kg/m2 Needs review Never Smoker BMI and BSA Data Body Mass Index Body Surface Area  
 24 kg/m 2 1.57 m 2 Preferred Pharmacy Pharmacy Name Phone [id number]- [place], [place] - [address] [phone] Your Updated Medication List  
  
   
This list is accurate as of: 2/7/18  8:22 AM.  Always use your most recent med list.  
  
  
  
  
 calcium citrate-vitamin D3 tablet Commonly known as:  CITRACAL WITH VITAMIN D MAXIMUM Take 1 Tab by mouth two (2) times a day. folic acid 1 mg tablet Commonly known as:  Google Take 1 mg by mouth daily. HYDROcodone-acetaminophen 5-325 mg per tablet Commonly known as:  Mortimer Newness Take 1 Tab by mouth every six (6) hours as needed for Pain. Max Daily Amount: 4 Tabs. methocarbamol 500 mg tablet Commonly known as:  ROBAXIN Take 1 Tab by mouth three (3) times daily. As needed for neck pain  
  
 methotrexate 2.5 mg tablet Commonly known as:  RHEUMATREX  
2.5 mg Every Friday. naproxen 500 mg tablet Commonly known as:  NAPROSYN Take 1 Tab by mouth two (2) times daily (with meals). As needed for neck pain  
  
 rosuvastatin 40 mg tablet Commonly known as:  CRESTOR Take 1 Tab by mouth nightly. traMADol 50 mg tablet Commonly known as:  ULTRAM  
TAKE 1 TABLET BY MOUTH EVERY 8 HOURS AS NEEDED FOR PAIN, MAXIMUM 3 TABLETS PER DAY. triamcinolone acetonide 0.1 % topical cream  
Commonly known as:  KENALOG Apply  to affected area two (2) times a day. use thin layer VITAMIN D3 1,000 unit tablet Generic drug:  cholecalciferol Take 1,000 Units by mouth daily. Prescriptions Sent to Pharmacy Refills  
 triamcinolone acetonide (KENALOG) 0.1 % topical cream 0 Sig: Apply  to affected area two (2) times a day. use thin layer  Class: Normal  
 Pharmacy: [facility] [address] Ph #: [phone] Route: Topical  
  
Follow-up Instructions Return in about 3 months (around 5/7/2018). Patient Instructions Well Visit, Women 48 to 72: Care Instructions Your Care Instructions Physical exams can help you stay healthy. Your doctor has checked your overall health and may have suggested ways to take good care of yourself. He or she also may have recommended tests. At home, you can help prevent illness with healthy eating, regular exercise, and other steps. Follow-up care is a key part of your treatment and safety. Be sure to make and go to all appointments, and call your doctor if you are having problems. It's also a good idea to know your test results and keep a list of the medicines you take. How can you care for yourself at home? · Reach and stay at a healthy weight. This will lower your risk for many problems, such as obesity, diabetes, heart disease, and high blood pressure. · Get at least 30 minutes of exercise on most days of the week. Walking is a good choice. You also may want to do other activities, such as running, swimming, cycling, or playing tennis or team sports. · Do not smoke. Smoking can make health problems worse. If you need help quitting, talk to your doctor about stop-smoking programs and medicines. These can increase your chances of quitting for good. · Protect your skin from too much sun. When you're outdoors from 10 a.m. to 4 p.m., stay in the shade or cover up with clothing and a hat with a wide brim. Wear sunglasses that block UV rays. Even when it's cloudy, put broad-spectrum sunscreen (SPF 30 or higher) on any exposed skin. · See a dentist one or two times a year for checkups and to have your teeth cleaned. · Wear a seat belt in the car. · Limit alcohol to 1 drink a day. Too much alcohol can cause health problems. Follow your doctor's advice about when to have certain tests. These tests can spot problems early. · Cholesterol. Your doctor will tell you how often to have this done based on your age, family history, or other things that can increase your risk for heart attack and stroke. · Blood pressure. Have your blood pressure checked during a routine doctor visit. Your doctor will tell you how often to check your blood pressure based on your age, your blood pressure results, and other factors. · Mammogram. Ask your doctor how often you should have a mammogram, which is an X-ray of your breasts. A mammogram can spot breast cancer before it can be felt and when it is easiest to treat. · Pap test and pelvic exam. Ask your doctor how often you should have a Pap test. You may not need to have a Pap test as often as you used to. · Vision. Have your eyes checked every year or two or as often as your doctor suggests. Some experts recommend that you have yearly exams for glaucoma and other age-related eye problems starting at age 48. · Hearing. Tell your doctor if you notice any change in your hearing. You can have tests to find out how well you hear. · Diabetes. Ask your doctor whether you should have tests for diabetes. · Colon cancer. You should begin tests for colon cancer at age 48. You may have one of several tests. Your doctor will tell you how often to have tests based on your age and risk. Risks include whether you already had a precancerous polyp removed from your colon or whether your parents, sisters and brothers, or children have had colon cancer. · Thyroid disease. Talk to your doctor about whether to have your thyroid checked as part of a regular physical exam. Women have an increased chance of a thyroid problem. · Osteoporosis. You should begin tests for bone density at age 72.  If you are younger than 72, ask your doctor whether you have factors that may increase your risk for this disease. You may want to have this test before age 72. · Heart attack and stroke risk. At least every 4 to 6 years, you should have your risk for heart attack and stroke assessed. Your doctor uses factors such as your age, blood pressure, cholesterol, and whether you smoke or have diabetes to show what your risk for a heart attack or stroke is over the next 10 years. When should you call for help? Watch closely for changes in your health, and be sure to contact your doctor if you have any problems or symptoms that concern you. Where can you learn more? Go to http://sherry-moon.info/. Enter Y809 in the search box to learn more about \"Well Visit, Women 50 to 72: Care Instructions. \" Current as of: May 12, 2017 Content Version: 11.4 © 4954-0737 Healthwise, Incorporated. Care instructions adapted under license by [employer] (which disclaims liability or warranty for this information). If you have questions about a medical condition or this instruction, always ask your healthcare professional. [place] [address] any warranty or liability for your use of this information. Introducing \[facility]\"" & HEALTH SERVICES! [facility] introduces [employer] patient portal. Now you can access parts of your medical record, email your doctor's office, and request medication refills online. 1. In your internet browser, go to [email or web address] 2. Click on the First Time User? Click Here link in the Sign In box. You will see the New Member Sign Up page. 3. Enter your [employer] Access Code exactly as it appears below. You will not need to use this code after youve completed the sign-up process. If you do not sign up before the expiration date, you must request a new code. · [employer] Access Code: [email or web address] Expires: 5/2/2018  8:59 AM 
 
4.  Enter the last four digits of your Social Security Number (xxxx) and Date of Birth (mm/dd/yyyy) as indicated and click Submit. You will be taken to the next sign-up page. 5. Create a [employer] ID. This will be your [employer] login ID and cannot be changed, so think of one that is secure and easy to remember. 6. Create a [employer] password. You can change your password at any time. 7. Enter your Password Reset Question and Answer. This can be used at a later time if you forget your password. 8. Enter your e-mail address. You will receive e-mail notification when new information is available in [address] 9. Click Sign Up. You can now view and download portions of your medical record. 10. Click the Download Summary menu link to download a portable copy of your medical information. If you have questions, please visit the Frequently Asked Questions section of the [employer] website. Remember, [employer] is NOT to be used for urgent needs. For medical emergencies, dial 911. Now available from your iPhone and Android! Please provide this summary of care documentation to your next provider. Your primary care clinician is listed as [address] Jina Izquierdo. If you have any questions after today's visit, please call [phone].

## 2018-02-07 NOTE — PATIENT INSTRUCTIONS
Well Visit, Women 48 to 72: Care Instructions  Your Care Instructions    Physical exams can help you stay healthy. Your doctor has checked your overall health and may have suggested ways to take good care of yourself. He or she also may have recommended tests. At home, you can help prevent illness with healthy eating, regular exercise, and other steps. Follow-up care is a key part of your treatment and safety. Be sure to make and go to all appointments, and call your doctor if you are having problems. It's also a good idea to know your test results and keep a list of the medicines you take. How can you care for yourself at home? · Reach and stay at a healthy weight. This will lower your risk for many problems, such as obesity, diabetes, heart disease, and high blood pressure. · Get at least 30 minutes of exercise on most days of the week. Walking is a good choice. You also may want to do other activities, such as running, swimming, cycling, or playing tennis or team sports. · Do not smoke. Smoking can make health problems worse. If you need help quitting, talk to your doctor about stop-smoking programs and medicines. These can increase your chances of quitting for good. · Protect your skin from too much sun. When you're outdoors from 10 a.m. to 4 p.m., stay in the shade or cover up with clothing and a hat with a wide brim. Wear sunglasses that block UV rays. Even when it's cloudy, put broad-spectrum sunscreen (SPF 30 or higher) on any exposed skin. · See a dentist one or two times a year for checkups and to have your teeth cleaned. · Wear a seat belt in the car. · Limit alcohol to 1 drink a day. Too much alcohol can cause health problems. Follow your doctor's advice about when to have certain tests. These tests can spot problems early. · Cholesterol.  Your doctor will tell you how often to have this done based on your age, family history, or other things that can increase your risk for heart attack and stroke. · Blood pressure. Have your blood pressure checked during a routine doctor visit. Your doctor will tell you how often to check your blood pressure based on your age, your blood pressure results, and other factors. · Mammogram. Ask your doctor how often you should have a mammogram, which is an X-ray of your breasts. A mammogram can spot breast cancer before it can be felt and when it is easiest to treat. · Pap test and pelvic exam. Ask your doctor how often you should have a Pap test. You may not need to have a Pap test as often as you used to. · Vision. Have your eyes checked every year or two or as often as your doctor suggests. Some experts recommend that you have yearly exams for glaucoma and other age-related eye problems starting at age 48. · Hearing. Tell your doctor if you notice any change in your hearing. You can have tests to find out how well you hear. · Diabetes. Ask your doctor whether you should have tests for diabetes. · Colon cancer. You should begin tests for colon cancer at age 48. You may have one of several tests. Your doctor will tell you how often to have tests based on your age and risk. Risks include whether you already had a precancerous polyp removed from your colon or whether your parents, sisters and brothers, or children have had colon cancer. · Thyroid disease. Talk to your doctor about whether to have your thyroid checked as part of a regular physical exam. Women have an increased chance of a thyroid problem. · Osteoporosis. You should begin tests for bone density at age 72. If you are younger than 72, ask your doctor whether you have factors that may increase your risk for this disease. You may want to have this test before age 72. · Heart attack and stroke risk. At least every 4 to 6 years, you should have your risk for heart attack and stroke assessed.  Your doctor uses factors such as your age, blood pressure, cholesterol, and whether you smoke or have diabetes to show what your risk for a heart attack or stroke is over the next 10 years. When should you call for help? Watch closely for changes in your health, and be sure to contact your doctor if you have any problems or symptoms that concern you. Where can you learn more? Go to http://sherry-moon.info/. Enter K747 in the search box to learn more about \"Well Visit, Women 50 to 72: Care Instructions. \"  Current as of: May 12, 2017  Content Version: 11.4  © 6231-8897 [employer]. Care instructions adapted under license by [employer] (which disclaims liability or warranty for this information). If you have questions about a medical condition or this instruction, always ask your healthcare professional. [address] any warranty or liability for your use of this information.

## 2018-02-12 ENCOUNTER — LAB ONLY (OUTPATIENT)
Dept: FAMILY MEDICINE CLINIC | Age: 53
End: 2018-02-12

## 2018-02-12 DIAGNOSIS — R31.29 MICROSCOPIC HEMATURIA: Primary | ICD-10-CM

## 2018-02-12 LAB
BACTERIA UA POCT, BACTPOCT: ABNORMAL
BILIRUB UR QL STRIP: NEGATIVE
CASTS UA POCT: NEGATIVE
CLUE CELLS, CLUEPOCT: NEGATIVE
CRYSTALS UA POCT, CRYSPOCT: NEGATIVE
EPITHELIAL CELLS POCT: ABNORMAL
GLUCOSE UR-MCNC: NEGATIVE MG/DL
KETONES P FAST UR STRIP-MCNC: NEGATIVE MG/DL
MUCUS UA POCT, MUCPOCT: ABNORMAL
PH UR STRIP: 8.5 [PH] (ref 4.6–8)
PROT UR QL STRIP: NEGATIVE
RBC UA POCT, RBCPOCT: ABNORMAL
SP GR UR STRIP: 1.01 (ref 1–1.03)
TRICH UA POCT, TRICHPOC: NEGATIVE
UA UROBILINOGEN AMB POC: ABNORMAL (ref 0.2–1)
URINALYSIS CLARITY POC: CLEAR
URINALYSIS COLOR POC: YELLOW
URINE BLOOD POC: ABNORMAL
URINE CULT COMMENT, POCT: ABNORMAL
URINE LEUKOCYTES POC: NEGATIVE
URINE NITRITES POC: NEGATIVE
WBC UA POCT, WBCPOCT: ABNORMAL
YEAST UA POCT, YEASTPOC: NEGATIVE

## 2018-02-12 NOTE — PROGRESS NOTES
Patient comes in today for lab work only - UA clean catch needed to reevaluate trace microscopic hematuria.

## 2018-02-23 DIAGNOSIS — E78.5 DYSLIPIDEMIA, GOAL LDL BELOW 100: Chronic | ICD-10-CM

## 2018-02-23 NOTE — TELEPHONE ENCOUNTER
Pt.anum that she needs [facility] to call her about her urine results and she wants to know about her rosuvastatin (CRESTOR) 40 mg tablet she can be reached @ [phone]

## 2018-02-26 RX ORDER — ROSUVASTATIN CALCIUM 40 MG/1
40 TABLET, COATED ORAL
Qty: 30 TAB | Refills: 5 | Status: SHIPPED | OUTPATIENT
Start: 2018-02-26 | End: 2018-03-25 | Stop reason: SDUPTHER

## 2018-04-27 DIAGNOSIS — M54.2 NECK PAIN: ICD-10-CM

## 2018-04-27 RX ORDER — METHOCARBAMOL 500 MG/1
TABLET, FILM COATED ORAL
Qty: 60 TAB | Refills: 1 | Status: SHIPPED | OUTPATIENT
Start: 2018-04-27 | End: 2018-06-19 | Stop reason: SDUPTHER

## 2018-05-18 DIAGNOSIS — E78.5 DYSLIPIDEMIA, GOAL LDL BELOW 100: Chronic | ICD-10-CM

## 2018-05-18 RX ORDER — ROSUVASTATIN CALCIUM 40 MG/1
TABLET, COATED ORAL
Qty: 30 TAB | Refills: 10 | Status: SHIPPED | OUTPATIENT
Start: 2018-05-18 | End: 2019-02-27 | Stop reason: SDUPTHER

## 2018-09-13 ENCOUNTER — OFFICE VISIT (OUTPATIENT)
Dept: FAMILY MEDICINE CLINIC | Age: 53
End: 2018-09-13

## 2018-09-13 VITALS
RESPIRATION RATE: 18 BRPM | WEIGHT: 130.2 LBS | DIASTOLIC BLOOD PRESSURE: 61 MMHG | BODY MASS INDEX: 24.58 KG/M2 | TEMPERATURE: 97.4 F | HEIGHT: 61 IN | HEART RATE: 90 BPM | OXYGEN SATURATION: 98 % | SYSTOLIC BLOOD PRESSURE: 135 MMHG

## 2018-09-13 DIAGNOSIS — G60.0 CMT (CHARCOT-MARIE-TOOTH DISEASE): Chronic | ICD-10-CM

## 2018-09-13 DIAGNOSIS — Z12.39 BREAST CANCER SCREENING: ICD-10-CM

## 2018-09-13 DIAGNOSIS — E78.5 DYSLIPIDEMIA, GOAL LDL BELOW 100: Primary | Chronic | ICD-10-CM

## 2018-09-13 DIAGNOSIS — M06.9 RHEUMATOID ARTHRITIS INVOLVING MULTIPLE SITES, UNSPECIFIED RHEUMATOID FACTOR PRESENCE: Chronic | ICD-10-CM

## 2018-09-13 DIAGNOSIS — M85.80 OSTEOPENIA, UNSPECIFIED LOCATION: ICD-10-CM

## 2018-09-13 DIAGNOSIS — R31.29 MICROSCOPIC HEMATURIA: ICD-10-CM

## 2018-09-13 LAB
BACTERIA UA POCT, BACTPOCT: NORMAL
BILIRUB UR QL STRIP: NEGATIVE
CASTS UA POCT: NORMAL
CLUE CELLS, CLUEPOCT: NORMAL
CRYSTALS UA POCT, CRYSPOCT: NORMAL
EPITHELIAL CELLS POCT: NORMAL
GLUCOSE UR-MCNC: NEGATIVE MG/DL
KETONES P FAST UR STRIP-MCNC: NEGATIVE MG/DL
MUCUS UA POCT, MUCPOCT: NORMAL
PH UR STRIP: 7.5 [PH] (ref 4.6–8)
PROT UR QL STRIP: NEGATIVE
RBC UA POCT, RBCPOCT: NORMAL
SP GR UR STRIP: 1.01 (ref 1–1.03)
TRICH UA POCT, TRICHPOC: NORMAL
UA UROBILINOGEN AMB POC: NORMAL (ref 0.2–1)
URINALYSIS CLARITY POC: CLEAR
URINALYSIS COLOR POC: YELLOW
URINE BLOOD POC: NEGATIVE
URINE CULT COMMENT, POCT: NORMAL
URINE LEUKOCYTES POC: NEGATIVE
URINE NITRITES POC: NEGATIVE
WBC UA POCT, WBCPOCT: NORMAL
YEAST UA POCT, YEASTPOC: NORMAL

## 2018-09-13 NOTE — MR AVS SNAPSHOT
[facility] 
 
 
 [address] [facility] [address] [id number] 
[phone] Patient: Ted Ascencio MRN: [id number] :1965 Visit Information Date & Time Provider Department Dept. Phone Encounter #  
 2018 11:30 AM Jelly Pagan [phone] [id number] Follow-up Instructions Return in about 6 months (around 3/13/2019), or if symptoms worsen or fail to improve. Upcoming Health Maintenance Date Due  
 PAP AKA CERVICAL CYTOLOGY 2018 Influenza Age 5 to Adult 2018 BREAST CANCER SCRN MAMMOGRAM 10/5/2019 DTaP/Tdap/Td series (2 - Td) 2022 COLONOSCOPY 2026 Allergies as of 2018  Review Complete On: 2018 By: Anabell Nunez NP Severity Noted Reaction Type Reactions Flomax [Tamsulosin] High 2011    Other (comments) Severe headache Percocet [Oxycodone-acetaminophen] High 10/22/2010    Nausea and Vomiting, Other (comments)  
 dizziness Lipitor [Atorvastatin] Medium 2013   Side Effect Other (comments) Dizzy and nausea Zetia [Ezetimibe] Medium 12/10/2010   Side Effect Other (comments)  
 headaches  
 Gabapentin  03/15/2010    Nausea and Vomiting  
 dizziness Naproxen  2018    Nausea and Vomiting Prednisone  03/15/2010    Nausea and Vomiting Current Immunizations  Reviewed on 2015 Name Date Influenza Vaccine 2014  4:43 PM, 2013, 2013 TDAP Vaccine 2012 Not reviewed this visit You Were Diagnosed With   
  
 Codes Comments Essential hypertension, benign    -  Primary ICD-10-CM: I10 
ICD-9-CM: 401.1 Dyslipidemia, goal LDL below 100     ICD-10-CM: E78.5 ICD-9-CM: 272.4 Osteopenia, unspecified location     ICD-10-CM: M85.80 ICD-9-CM: 733.90   
 CMT (Charcot-Louise-Tooth disease)     ICD-10-CM: G60.0 ICD-9-CM: 356.1 Rheumatoid arthritis involving multiple sites, unspecified rheumatoid factor presence ([facility] [address]     ICD-10-CM: M06.9 ICD-9-CM: 714.0 Breast cancer screening     ICD-10-CM: Z12.31 
ICD-9-CM: V76.10 Vitals BP Pulse Temp Resp Height(growth percentile) Weight(growth percentile) 135/61 (BP 1 Location: Right arm, BP Patient Position: Sitting) 90 97.4 °F (36.3 °C) (Oral) 18 5' 1\" (1.549 m) 130 lb 3.2 oz (59.1 kg) SpO2 BMI OB Status Smoking Status 98% 24.6 kg/m2 Needs review Never Smoker Vitals History BMI and BSA Data Body Mass Index Body Surface Area  
 24.6 kg/m 2 1.59 m 2 Preferred Pharmacy Pharmacy Name Phone [id number] - [address] [phone] Your Updated Medication List  
  
   
This list is accurate as of 9/13/18 12:29 PM.  Always use your most recent med list.  
  
  
  
  
 calcium citrate-vitamin D3 tablet Commonly known as:  CITRACAL WITH VITAMIN D MAXIMUM Take 1 Tab by mouth two (2) times a day. folic acid 1 mg tablet Commonly known as:  Google Take 1 mg by mouth daily. methocarbamol 500 mg tablet Commonly known as:  ROBAXIN  
TAKE 1 TABLET BY MOUTH 3 TIMES A DAY AS NEEDED FOR NECK PAIN  
  
 methotrexate 2.5 mg tablet Commonly known as:  RHEUMATREX  
2.5 mg Every Friday. rosuvastatin 40 mg tablet Commonly known as:  CRESTOR  
TAKE 1 TABLET BY MOUTH AT BEDTIME  
  
 triamcinolone acetonide 0.1 % topical cream  
Commonly known as:  KENALOG Apply  to affected area two (2) times a day. use thin layer VITAMIN D3 1,000 unit tablet Generic drug:  cholecalciferol Take 1,000 Units by mouth daily. We Performed the Following LIPID PANEL [03736 CPT(R)] METABOLIC PANEL, COMPREHENSIVE [27822 CPT(R)] VITAMIN D, 25 HYDROXY [id number] CPT(R)] Follow-up Instructions Return in about 6 months (around 3/13/2019), or if symptoms worsen or fail to improve. To-Do List   
 09/13/2018 Imaging:  DEXA BONE DENSITY STUDY AXIAL   
  
 09/13/2018 Imaging:  ABDULLAHI MAMMO BI SCREENING INCL CAD Referral Information Referral ID Referred By Referred To  
  
 [id number] [facility] Not Available Visits Status Start Date End Date 1 New Request 9/13/18 9/13/19 If your referral has a status of pending review or denied, additional information will be sent to support the outcome of this decision. Patient Instructions Learning About Osteopenia What is osteopenia? Osteopenia is a decrease in thickness, or density, in bones. That means the bones become thinner and weaker. It is much more common in women than in men. It is an early form of osteoporosis, a condition in which the bones are so thin and weak that they can break easily. It's important to know that osteopenia is not a disease. It can happen normally with aging. Having osteopenia means that there is a greater risk that you may get osteoporosis. It also means that you are more likely to break a bone than someone who does not have osteopenia. But not everyone with osteopenia gets osteoporosis or breaks a bone. Osteopenia doesn't cause any symptoms. It's usually found with a type of X-ray called a bone density test. Osteopenia means that your bone density result (T-score) is between -1.0 and -2.5. What increases the risk for osteopenia? Things that increase your risk include: 
· Having a family history of osteoporosis. · Being thin. · Being white or . · Getting too little physical activity. · Smoking. · Drinking too much alcohol often. · Using certain medicines such as steroids. How can you prevent osteoporosis? There are things you can do to slow down osteopenia and prevent osteoporosis. Certain lifestyle changes will help slow the loss of bone density. · Eat food that has plenty of calcium and vitamin D. Yogurt, cheese, milk, and dark green vegetables are high in calcium. Eggs, fatty fish, cereal, and fortified milk are high in vitamin D. 
· Talk to your doctor about taking a calcium supplement that has vitamin D in it. · Get regular exercise. ¨ Do 30 minutes of weight-bearing exercise on most days of the week. Walking, jogging, stair climbing, and dancing are good choices. ¨ Do resistance exercises with weights or elastic bands 2 or 3 days a week. · Limit alcohol to 2 drinks a day for men and 1 drink a day for women. Too much alcohol can cause health problems. · Do not smoke. Smoking can make bones thin faster. If you need help quitting, talk to your doctor about stop-smoking programs and medicines. These can increase your chances of quitting for good. Prescription medicines are available for treating bone thinning. But these are more often used to treat osteoporosis. Follow-up care is a key part of your treatment and safety. Be sure to make and go to all appointments, and call your doctor if you are having problems. It's also a good idea to know your test results and keep a list of the medicines you take. Where can you learn more? Go to http://sherry-moon.info/. Enter U130 in the search box to learn more about \"Learning About Osteopenia. \" Current as of: May 17, 2017 Content Version: 11.7 © 2048-8994 [employer]. Care instructions adapted under license by [employer] (which disclaims liability or warranty for this information). If you have questions about a medical condition or this instruction, always ask your healthcare professional. [address] any warranty or liability for your use of this information. Introducing [facility]!    
 [facility] introduces [employer] patient portal. Now you can access parts of your medical record, email your doctor's office, and request medication refills online. 1. In your internet browser, go to [email or web address] [email or web address] 2. Click on the First Time User? Click Here link in the Sign In box. You will see the New Member Sign Up page. 3. Enter your [employer] Access Code exactly as it appears below. You will not need to use this code after youve completed the sign-up process. If you do not sign up before the expiration date, you must request a new code. · [employer] Access Code: [email or web address] Expires: 12/12/2018 11:17 AM 
 
4. Enter the last four digits of your Social Security Number (xxxx) and Date of Birth (mm/dd/yyyy) as indicated and click Submit. You will be taken to the next sign-up page. 5. Create a [employer] ID. This will be your [employer] login ID and cannot be changed, so think of one that is secure and easy to remember. 6. Create a [employer] password. You can change your password at any time. 7. Enter your Password Reset Question and Answer. This can be used at a later time if you forget your password. 8. Enter your e-mail address. You will receive e-mail notification when new information is available in [address] 9. Click Sign Up. You can now view and download portions of your medical record. 10. Click the Download Summary menu link to download a portable copy of your medical information. If you have questions, please visit the Frequently Asked Questions section of the [employer] website. Remember, [employer] is NOT to be used for urgent needs. For medical emergencies, dial 911. Now available from your iPhone and Android! Please provide this summary of care documentation to your next provider. Your primary care clinician is listed as [address] Jina Izquierdo. If you have any questions after today's visit, please call [phone].

## 2018-09-13 NOTE — PROGRESS NOTES
Chief Complaint Patient presents with  Cholesterol Problem Follow Up Patient states wants to check out back pain, UA (due to blood in urine), chol check, and referral for Bone density and Mammo. 1. Have you been to the ER, urgent care clinic since your last visit? Hospitalized since your last visit? No 
 
2. Have you seen or consulted any other health care providers outside of the [facility] since your last visit? Include any pap smears or colon screening. Yes, RA doc last week. Patient declines Flu shot at this time. Health Maintenance Due Topic Date Due  
 PAP AKA CERVICAL CYTOLOGY  02/23/2018  Influenza Age 5 to Adult  08/01/2018

## 2018-09-13 NOTE — PATIENT INSTRUCTIONS
Learning About Osteopenia What is osteopenia? Osteopenia is a decrease in thickness, or density, in bones. That means the bones become thinner and weaker. It is much more common in women than in men. It is an early form of osteoporosis, a condition in which the bones are so thin and weak that they can break easily. It's important to know that osteopenia is not a disease. It can happen normally with aging. Having osteopenia means that there is a greater risk that you may get osteoporosis. It also means that you are more likely to break a bone than someone who does not have osteopenia. But not everyone with osteopenia gets osteoporosis or breaks a bone. Osteopenia doesn't cause any symptoms. It's usually found with a type of X-ray called a bone density test. Osteopenia means that your bone density result (T-score) is between -1.0 and -2.5. What increases the risk for osteopenia? Things that increase your risk include: 
· Having a family history of osteoporosis. · Being thin. · Being white or . · Getting too little physical activity. · Smoking. · Drinking too much alcohol often. · Using certain medicines such as steroids. How can you prevent osteoporosis? There are things you can do to slow down osteopenia and prevent osteoporosis. Certain lifestyle changes will help slow the loss of bone density. · Eat food that has plenty of calcium and vitamin D. Yogurt, cheese, milk, and dark green vegetables are high in calcium. Eggs, fatty fish, cereal, and fortified milk are high in vitamin D. 
· Talk to your doctor about taking a calcium supplement that has vitamin D in it. · Get regular exercise. ¨ Do 30 minutes of weight-bearing exercise on most days of the week. Walking, jogging, stair climbing, and dancing are good choices. ¨ Do resistance exercises with weights or elastic bands 2 or 3 days a week. · Limit alcohol to 2 drinks a day for men and 1 drink a day for women.  Too much alcohol can cause health problems. · Do not smoke. Smoking can make bones thin faster. If you need help quitting, talk to your doctor about stop-smoking programs and medicines. These can increase your chances of quitting for good. Prescription medicines are available for treating bone thinning. But these are more often used to treat osteoporosis. Follow-up care is a key part of your treatment and safety. Be sure to make and go to all appointments, and call your doctor if you are having problems. It's also a good idea to know your test results and keep a list of the medicines you take. Where can you learn more? Go to http://sherry-moon.info/. Enter N957 in the search box to learn more about \"Learning About Osteopenia. \" Current as of: May 17, 2017 Content Version: 11.7 © 5600-3052 [employer]. Care instructions adapted under license by "[employer] (which disclaims liability or warranty for this information). If you have questions about a medical condition or this instruction, always ask your healthcare professional. [address] any warranty or liability for your use of this information.

## 2018-09-13 NOTE — PROGRESS NOTES
HISTORY OF PRESENT ILLNESS Maddy Lorenzo is a 47 y.o. female. HPI  Patient comes in today for follow up. Needs to have lipid panel. Has been complaint with cholesterol medications. Tries to follow low fat, low cholesterol diet. Seeing rheumatology for RA. On methotrexate Due to see GYN - Dr. Allie Fried. No hx of abnormal pap. Recheck urine for trace blood. States she has some burning with urination. Some urinary frequency. No abdominal pain, denies flank pain. Urine is yellow, clear. She needs to increase water intake. States she has not been drinking as much water. No menses - postmenopausal. 
Needs referral for mammogram and bone density. Ox of osteoporosis. Last DEX scan showed improvement from previous study 2 years prior. Allergies Allergen Reactions  Flomax [Tamsulosin] Other (comments) Severe headache  Percocet [Oxycodone-Acetaminophen] Nausea and Vomiting and Other (comments)  
  dizziness  Lipitor [Atorvastatin] Other (comments) Dizzy and nausea  Zetia [Ezetimibe] Other (comments)  
  headaches  Gabapentin Nausea and Vomiting  
  dizziness  Naproxen Nausea and Vomiting  Prednisone Nausea and Vomiting Past Medical History:  
Diagnosis Date  Aortic valvar stenosis 2/23/2015  Calculus of kidney  Carotid stenosis, bilateral   
 CMT (Charcot Louise Tooth) disease  CTS (carpal tunnel syndrome)  High risk for fracture due to osteoporosis by DEXA scan 2/23/2015  Hypercholesteremia 3/15/2010  Osteoporosis 9/27/2014  RA (rheumatoid arthritis) ([facility] [address] 3/15/2010 Past Surgical History:  
Procedure Laterality Date  HX GYN    
 BTL  RENAL SCOPE,REMV FB/STONE Social History Social History  Marital status:  Spouse name: N/A  
 Number of children: N/A  
 Years of education: N/A Occupational History  Not on file. Social History Main Topics  Smoking status: Never Smoker  Smokeless tobacco: Never Used  Alcohol use No  
 Drug use: No  
 Sexual activity: Not on file Other Topics Concern  Not on file Social History Narrative Family History Problem Relation Age of Onset  Thyroid Disease Mother  Elevated Lipids Mother  Heart Disease Mother  Stroke Mother 48  Cancer Maternal Aunt   
  breast  
 Breast Cancer Maternal Aunt  Cancer Maternal Grandmother   
  throat/was snuff user Current Outpatient Prescriptions Medication Sig  
 methocarbamol (ROBAXIN) 500 mg tablet TAKE 1 TABLET BY MOUTH 3 TIMES A DAY AS NEEDED FOR NECK PAIN  
 rosuvastatin (CRESTOR) 40 mg tablet TAKE 1 TABLET BY MOUTH AT BEDTIME  triamcinolone acetonide (KENALOG) 0.1 % topical cream Apply  to affected area two (2) times a day. use thin layer  methotrexate (RHEUMATREX) 2.5 mg tablet 2.5 mg Every Friday.  folic acid (FOLVITE) 1 mg tablet Take 1 mg by mouth daily.  cholecalciferol (VITAMIN D3) 1,000 unit tablet Take 1,000 Units by mouth daily.  naproxen (NAPROSYN) 500 mg tablet Take 1 Tab by mouth two (2) times daily (with meals). As needed for neck pain (Patient not taking: Reported on 9/13/2018)  traMADol (ULTRAM) 50 mg tablet TAKE 1 TABLET BY MOUTH EVERY 8 HOURS AS NEEDED FOR PAIN, MAXIMUM 3 TABLETS PER DAY. (Patient not taking: Reported on 9/13/2018)  HYDROcodone-acetaminophen (NORCO) 5-325 mg per tablet Take 1 Tab by mouth every six (6) hours as needed for Pain. Max Daily Amount: 4 Tabs. (Patient not taking: Reported on 9/13/2018)  calcium citrate-vitamin D3 (CITRACAL WITH VITAMIN D MAXIMUM) tablet Take 1 Tab by mouth two (2) times a day. (Patient not taking: Reported on 9/13/2018) No current facility-administered medications for this visit. Review of Systems Constitutional: Negative for chills and fever. Respiratory: Negative for shortness of breath. Cardiovascular: Negative for chest pain and palpitations. Gastrointestinal: Negative for abdominal pain, nausea and vomiting. Genitourinary: Positive for dysuria and frequency. Negative for urgency. Musculoskeletal: Positive for back pain (off and on, depends on amount of lifting), joint pain (hx RA) and neck pain. Skin: Negative for itching and rash. Neurological: Negative for dizziness, tingling, sensory change, focal weakness and headaches. Vitals:  
 09/13/18 1130 BP: 135/61 Pulse: 90 Resp: 18 Temp: 97.4 °F (36.3 °C) TempSrc: Oral  
SpO2: 98% Weight: 130 lb 3.2 oz (59.1 kg) Height: 5' 1\" (1.549 m) Physical Exam 
 
ASSESSMENT and PLAN 
  ICD-10-CM ICD-9-CM 1. Dyslipidemia, goal LDL below 100 G01.6 934.6 METABOLIC PANEL, COMPREHENSIVE  
   LIPID PANEL 2. Osteopenia, unspecified location M85.80 733.90 VITAMIN D, 25 HYDROXY  
   DEXA BONE DENSITY STUDY AXIAL 3. CMT (Charcot-Louise-Tooth disease) G60.0 356.1 VITAMIN D, 25 HYDROXY 4. Microscopic hematuria R31.29 599.72 AMB POC URINALYSIS DIP STICK AUTO W/ MICRO 5. Rheumatoid arthritis involving multiple sites, unspecified rheumatoid factor presence ([facility]) M06.9 714.0 6. Breast cancer screening Z12.31 V76.10 [facility] MAMMO BI SCREENING INCL CAD Encounter Diagnoses Name Primary?  Dyslipidemia, goal LDL below 100 Yes  Osteopenia, unspecified location  CMT (Charcot-Louise-Tooth disease)  Microscopic hematuria  Rheumatoid arthritis involving multiple sites, unspecified rheumatoid factor [facility]  Breast cancer screening Orders Placed This Encounter  DEXA BONE DENSITY STUDY AXIAL  [facility] MAMMO BI SCREENING INCL CAD  METABOLIC PANEL, COMPREHENSIVE  LIPID PANEL  
 VITAMIN D, 25 HYDROXY  AMB POC URINALYSIS DIP STICK AUTO W/ MICRO Diagnoses and all orders for this visit: 1. Dyslipidemia, goal LDL below 100 - continue crestor, check FLP 
-     METABOLIC PANEL, COMPREHENSIVE 
-     LIPID PANEL 2. Osteopenia, unspecified location -     VITAMIN D, 25 HYDROXY 
-     DEXA BONE DENSITY STUDY AXIAL; Future 3. CMT (Charcot-Louise-Tooth disease) -     VITAMIN D, 25 HYDROXY 4. Microscopic hematuria -     AMB POC URINALYSIS DIP STICK AUTO W/ MICRO 5. Rheumatoid arthritis involving multiple sites, unspecified rheumatoid factor presence ([facility] [address] - followed by rheumatology. 6. Breast cancer screening -     [facility] MAMMO BI SCREENING INCL CAD; Future Follow-up Disposition: 
Return in about 6 months (around 3/13/2019), or if symptoms worsen or fail to improve. 
lab results and schedule of future lab studies reviewed with patient 
reviewed diet, exercise and weight control I have reviewed the patient's allergies and made any necessary changes. Medical, procedural, social and family histories have been reviewed and updated as medically indicated. I have reconciled and/or revised patient medications in the EMR. I have discussed each diagnosis listed in this note with Sheri Jorge and/or their family. I have discussed treatment options and the risk/benefit analysis of those options, including safe use of medications and possible medication side effects. Through the use of shared decision making we have agreed to the above plan. The patient has received an after-visit summary and questions were answered concerning future plans. Sylvie Monroy, JESICA-C This note will not be viewable in [address]

## 2018-09-14 LAB
25(OH)D3+25(OH)D2 SERPL-MCNC: 40.2 NG/ML (ref 30–100)
ALBUMIN SERPL-MCNC: 4.5 G/DL (ref 3.5–5.5)
ALBUMIN/GLOB SERPL: 1.6 {RATIO} (ref 1.2–2.2)
ALP SERPL-CCNC: 83 IU/L (ref 39–117)
ALT SERPL-CCNC: 23 IU/L (ref 0–32)
AST SERPL-CCNC: 23 IU/L (ref 0–40)
BILIRUB SERPL-MCNC: 0.4 MG/DL (ref 0–1.2)
BUN SERPL-MCNC: 9 MG/DL (ref 6–24)
BUN/CREAT SERPL: 17 (ref 9–23)
CALCIUM SERPL-MCNC: 9.8 MG/DL (ref 8.7–10.2)
CHLORIDE SERPL-SCNC: 102 MMOL/L (ref 96–106)
CHOLEST SERPL-MCNC: 263 MG/DL (ref 100–199)
CO2 SERPL-SCNC: 24 MMOL/L (ref 20–29)
CREAT SERPL-MCNC: 0.53 MG/DL (ref 0.57–1)
GLOBULIN SER CALC-MCNC: 2.9 G/DL (ref 1.5–4.5)
GLUCOSE SERPL-MCNC: 81 MG/DL (ref 65–99)
HDLC SERPL-MCNC: 51 MG/DL
INTERPRETATION, 910389: NORMAL
LDLC SERPL CALC-MCNC: 177 MG/DL (ref 0–99)
POTASSIUM SERPL-SCNC: 4.2 MMOL/L (ref 3.5–5.2)
PROT SERPL-MCNC: 7.4 G/DL (ref 6–8.5)
SODIUM SERPL-SCNC: 142 MMOL/L (ref 134–144)
TRIGL SERPL-MCNC: 175 MG/DL (ref 0–149)
VLDLC SERPL CALC-MCNC: 35 MG/DL (ref 5–40)

## 2018-09-26 NOTE — PROGRESS NOTES
RECOMMENDATIONS: 
Cholesterol numbers have come down a little more, but still not near goal of less than 100 mg/dL. There is a fairly new medication (came out 1-2 years ago) that is designed to be given with statins to get cholesterol numbers down when a statin alone does not do it. It is a once monthly injection that you would give yourself. I will enclose some information about the medication and you can do some research to see if this is something you would be interested in. No more blood in urine.  
 
Vitamin D normal.

## 2018-09-27 ENCOUNTER — TELEPHONE (OUTPATIENT)
Dept: FAMILY MEDICINE CLINIC | Age: 53
End: 2018-09-27

## 2018-09-27 NOTE — TELEPHONE ENCOUNTER
----- Message from Moon Allen NP sent at 9/25/2018  9:41 PM EDT -----  RECOMMENDATIONS:  Cholesterol numbers have come down a little more, but still not near goal of less than 100 mg/dL. There is a fairly new medication (came out 1-2 years ago) that is designed to be given with statins to get cholesterol numbers down when a statin alone does not do it. It is a once monthly injection that you would give yourself. I will enclose some information about the medication and you can do some research to see if this is something you would be interested in. No more blood in urine.     Vitamin D normal.

## 2018-11-27 ENCOUNTER — HOSPITAL ENCOUNTER (OUTPATIENT)
Dept: BONE DENSITY | Age: 53
Discharge: HOME | End: 2018-11-27
Payer: COMMERCIAL

## 2018-11-27 ENCOUNTER — HOSPITAL ENCOUNTER (OUTPATIENT)
Dept: MAMMOGRAPHY | Age: 53
Discharge: HOME | End: 2018-11-27
Payer: COMMERCIAL

## 2018-11-27 DIAGNOSIS — M85.80 OSTEOPENIA, UNSPECIFIED LOCATION: ICD-10-CM

## 2018-11-27 DIAGNOSIS — Z12.39 BREAST CANCER SCREENING: ICD-10-CM

## 2018-11-27 PROCEDURE — 77080 DXA BONE DENSITY AXIAL: CPT

## 2018-11-27 PROCEDURE — 77067 SCR MAMMO BI INCL CAD: CPT

## 2019-02-27 DIAGNOSIS — E78.5 DYSLIPIDEMIA, GOAL LDL BELOW 100: Chronic | ICD-10-CM

## 2019-02-27 RX ORDER — ROSUVASTATIN CALCIUM 40 MG/1
TABLET, COATED ORAL
Qty: 90 TAB | Refills: 3 | Status: SHIPPED | OUTPATIENT
Start: 2019-02-27 | End: 2019-07-19 | Stop reason: SDUPTHER

## 2019-02-27 NOTE — TELEPHONE ENCOUNTER
[facility] Requests A 90 day supply on the patients behalf.      Last Visit: 9/13/18  Next Appt: 3/13/19  Previous Refill Encounter: 5/18-30+10    Requested Prescriptions     Pending Prescriptions Disp Refills    rosuvastatin (CRESTOR) 40 mg tablet 90 Tab 0     Sig: TAKE 1 TABLET BY MOUTH AT BEDTIME

## 2019-03-22 ENCOUNTER — TELEPHONE (OUTPATIENT)
Dept: FAMILY MEDICINE CLINIC | Age: 54
End: 2019-03-22

## 2019-03-22 NOTE — TELEPHONE ENCOUNTER
Spoke with pt and informed appt was made at last appt in sept. Pt verbalized understanding. Pt rescheduled follow up for 4/19.

## 2019-03-22 NOTE — TELEPHONE ENCOUNTER
----- Message from Lillian Floyd sent at 3/21/2019  5:18 PM EDT -----  Regarding: Antione Monroy/ Telephone   Contact: [phone]  Pt would like a call back concerning a letter received for a missed appointment scheduled for 3/13/19 at 7:30 am that she never scheduled.

## 2019-04-19 ENCOUNTER — OFFICE VISIT (OUTPATIENT)
Dept: FAMILY MEDICINE CLINIC | Age: 54
End: 2019-04-19

## 2019-04-19 VITALS
BODY MASS INDEX: 23.6 KG/M2 | DIASTOLIC BLOOD PRESSURE: 81 MMHG | WEIGHT: 125 LBS | RESPIRATION RATE: 18 BRPM | SYSTOLIC BLOOD PRESSURE: 136 MMHG | TEMPERATURE: 97.5 F | OXYGEN SATURATION: 99 % | HEIGHT: 61 IN | HEART RATE: 90 BPM

## 2019-04-19 DIAGNOSIS — M06.9 RHEUMATOID ARTHRITIS INVOLVING MULTIPLE SITES, UNSPECIFIED RHEUMATOID FACTOR PRESENCE: ICD-10-CM

## 2019-04-19 DIAGNOSIS — I10 ESSENTIAL HYPERTENSION, BENIGN: ICD-10-CM

## 2019-04-19 DIAGNOSIS — E78.5 DYSLIPIDEMIA, GOAL LDL BELOW 100: Primary | ICD-10-CM

## 2019-04-19 DIAGNOSIS — G60.0 CMT (CHARCOT-MARIE-TOOTH DISEASE): ICD-10-CM

## 2019-04-19 DIAGNOSIS — M85.80 OSTEOPENIA, UNSPECIFIED LOCATION: ICD-10-CM

## 2019-04-19 RX ORDER — ALENDRONATE SODIUM 70 MG/1
70 TABLET ORAL
Qty: 4 TAB | Refills: 11 | Status: SHIPPED | OUTPATIENT
Start: 2019-04-19 | End: 2019-07-19 | Stop reason: ALTCHOICE

## 2019-04-19 RX ORDER — ALENDRONATE SODIUM 10 MG/1
10 TABLET ORAL
Status: CANCELLED | OUTPATIENT
Start: 2019-04-19

## 2019-04-19 NOTE — PROGRESS NOTES
Chief Complaint Patient presents with  Cholesterol Problem Pt would like to discuss different rheumatology. Pt would like to discuss allergies. Pt states has not taken anything thus far. 1. Have you been to the ER, urgent care clinic since your last visit? Hospitalized since your last visit? No 
 
2. Have you seen or consulted any other health care providers outside of the [address] [place] [address] since your last visit? Include any pap smears or colon screening. No 
 
Health Maintenance Due Topic Date Due  Shingrix Vaccine Age 50> (1 of 2) 11/09/2016  PAP AKA CERVICAL CYTOLOGY  02/23/2018

## 2019-04-19 NOTE — PROGRESS NOTES
HISTORY OF PRESENT ILLNESS Blanca Carter is a 47 y.o. female. HPI  Patient comes in today for follow up. Pt would like to discuss different rheumatologist.  Currently sees Dr. Beth Ford. Not happy with care, would like second opinion. States she has never had xrays done in his office. Does not ever receive any results from labs or xrays. She has been on methotrexate for 6-9 months. Would like to try Repatha for cholesterol. Needs to have lipid panel. Has been complaint with cholesterol medications.  Tries to follow low fat, low cholesterol diet. Pt would like to know what to take for allergies. Pt states has not taken anything thus far Due to see GYN - Dr. Sameera Portillo.  No hx of abnormal pap. Bone Density Study showed low bone density (osteopenia). As compared to the prior study, there has been a significant 4.9% increase in the left hip and a significant 4.1% increase in the spine. Allergies Allergen Reactions  Flomax [Tamsulosin] Other (comments) Severe headache  Percocet [Oxycodone-Acetaminophen] Nausea and Vomiting and Other (comments)  
  dizziness  Lipitor [Atorvastatin] Other (comments) Dizzy and nausea  Zetia [Ezetimibe] Other (comments)  
  headaches  Gabapentin Nausea and Vomiting  
  dizziness  Naproxen Nausea and Vomiting  Prednisone Nausea and Vomiting Past Medical History:  
Diagnosis Date  Aortic valvar stenosis 2/23/2015  Calculus of kidney  Carotid stenosis, bilateral   
 CMT (Charcot Louise Tooth) disease  CTS (carpal tunnel syndrome)  High risk for fracture due to osteoporosis by DEXA scan 2/23/2015  Hypercholesteremia 3/15/2010  Osteoporosis 9/27/2014  RA (rheumatoid arthritis) ([facility] [address] 3/15/2010 Past Surgical History:  
Procedure Laterality Date  HX GYN    
 BTL  RENAL SCOPE,REMV FB/STONE Social History Socioeconomic History  Marital status:  Spouse name: Not on file  Number of children: Not on file  Years of education: Not on file  Highest education level: Not on file Occupational History  Not on file Social Needs  Financial resource strain: Not on file  Food insecurity:  
  Worry: Not on file Inability: Not on file  Transportation needs:  
  Medical: Not on file Non-medical: Not on file Tobacco Use  Smoking status: Never Smoker  Smokeless tobacco: Never Used Substance and Sexual Activity  Alcohol use: No  
  Alcohol/week: 0.0 oz  Drug use: No  
 Sexual activity: Not on file Lifestyle  Physical activity:  
  Days per week: Not on file Minutes per session: Not on file  Stress: Not on file Relationships  Social connections:  
  Talks on phone: Not on file Gets together: Not on file Attends Shinto service: Not on file Active member of club or organization: Not on file Attends meetings of clubs or organizations: Not on file Relationship status: Not on file  Intimate partner violence:  
  Fear of current or ex partner: Not on file Emotionally abused: Not on file Physically abused: Not on file Forced sexual activity: Not on file Other Topics Concern  Not on file Social History Narrative  Not on file Family History Problem Relation Age of Onset  Thyroid Disease Mother  Elevated Lipids Mother  Heart Disease Mother  Stroke Mother 48  Cancer Maternal Aunt   
     breast  
 Breast Cancer Maternal Aunt  Cancer Maternal Grandmother   
     throat/was snuff user Current Outpatient Medications Medication Sig  
 rosuvastatin (CRESTOR) 40 mg tablet TAKE 1 TABLET BY MOUTH AT BEDTIME  methocarbamol (ROBAXIN) 500 mg tablet TAKE 1 TABLET BY MOUTH 3 TIMES A DAY AS NEEDED FOR NECK PAIN  
 methotrexate (RHEUMATREX) 2.5 mg tablet 2.5 mg. Take 6 Tablets weekly.  folic acid (FOLVITE) 1 mg tablet Take 1 mg by mouth daily.  calcium citrate-vitamin D3 (CITRACAL WITH VITAMIN D MAXIMUM) tablet Take 1 Tab by mouth two (2) times a day. (Patient not taking: Reported on 9/13/2018)  cholecalciferol (VITAMIN D3) 1,000 unit tablet Take 1,000 Units by mouth daily. No current facility-administered medications for this visit. Review of Systems Constitutional: Negative for chills and fever. Respiratory: Negative for shortness of breath. Cardiovascular: Negative for chest pain and palpitations. Gastrointestinal: Negative for abdominal pain, nausea and vomiting. Genitourinary: Negative for dysuria, frequency and urgency. Musculoskeletal: Positive for back pain (off and on, depends on amount of lifting), joint pain (hx RA) and neck pain. Skin: Negative for itching and rash. Neurological: Negative for dizziness, tingling, sensory change, focal weakness and headaches. Physical Exam  
Constitutional: She is oriented to person, place, and time. Vital signs are normal. She appears well-developed and well-nourished. She is cooperative. Cardiovascular: Normal rate and regular rhythm. Murmur heard. Systolic murmur is present. Pulses: 
     Radial pulses are 2+ on the right side, and 2+ on the left side. Pulmonary/Chest: Effort normal and breath sounds normal.  
Musculoskeletal:  
     Right hand: She exhibits decreased range of motion, tenderness, bony tenderness and deformity (rheumatoid nodules noted PIP and MCP joints). She exhibits normal capillary refill and no swelling. Normal sensation noted. Decreased strength noted. Left hand: She exhibits decreased range of motion, tenderness, bony tenderness and deformity (rheumatoid nodules noted PIP and MCP joints). She exhibits normal capillary refill and no swelling. Normal sensation noted. Decreased strength noted. Neurological: She is alert and oriented to person, place, and time. Skin: Skin is warm and dry. Psychiatric: She has a normal mood and affect. Her behavior is normal. Judgment and thought content normal.  
Vitals reviewed. ASSESSMENT and PLAN 
  ICD-10-CM ICD-9-CM 1. Dyslipidemia, goal LDL below 100 E78.5 272.4 evolocumab (REPATHA SYRINGE) syringe LIPID PANEL 2. Essential hypertension, benign T05 523.7 METABOLIC PANEL, COMPREHENSIVE  
   CBC WITH AUTOMATED DIFF 3. Osteopenia, unspecified location M85.80 733.90 REFERRAL TO RHEUMATOLOGY  
   alendronate (FOSAMAX) 70 mg tablet VITAMIN D, 25 HYDROXY 4. Rheumatoid arthritis involving multiple sites, unspecified rheumatoid factor presence ([address] M06.9 714.0 REFERRAL TO RHEUMATOLOGY 5. CMT (Charcot-Louise-Tooth disease) G60.0 356.1 REFERRAL TO RHEUMATOLOGY Encounter Diagnoses Name Primary?  Dyslipidemia, goal LDL below 100 Yes  Essential hypertension, benign  Osteopenia, unspecified location  Rheumatoid arthritis involving multiple sites, unspecified rheumatoid factor presence ([address]  CMT (Charcot-Louise-Tooth disease) Orders Placed This Encounter  METABOLIC PANEL, COMPREHENSIVE  
 CBC WITH AUTOMATED DIFF  
 LIPID PANEL  
 VITAMIN D, 25 HYDROXY  REFERRAL TO RHEUMATOLOGY  evolocumab (REPATHA SYRINGE) syringe  alendronate (FOSAMAX) 70 mg tablet Diagnoses and all orders for this visit: 1. Dyslipidemia, goal LDL below 100 
-     evolocumab (REPATHA SYRINGE) syringe; 1 mL by SubCUTAneous route every fourteen (14) days. 
-     LIPID PANEL 2. Essential hypertension, benign -     METABOLIC PANEL, COMPREHENSIVE 
-     CBC WITH AUTOMATED DIFF 3. Osteopenia, unspecified location 
-     REFERRAL TO RHEUMATOLOGY 
-     alendronate (FOSAMAX) 70 mg tablet; Take 1 Tab by mouth every seven (7) days. -     VITAMIN D, 25 HYDROXY 4. Rheumatoid arthritis involving multiple sites, unspecified rheumatoid factor presence ([address] 
-     REFERRAL TO RHEUMATOLOGY 5. CMT (Charcot-Louise-Tooth disease) -     REFERRAL TO RHEUMATOLOGY Follow-up and Dispositions · Return in about 6 months (around 10/19/2019), or if symptoms worsen or fail to improve. 
  
 
lab results and schedule of future lab studies reviewed with patient 
reviewed diet, exercise and weight control I have reviewed the patient's allergies and made any necessary changes. Medical, procedural, social and family histories have been reviewed and updated as medically indicated. I have reconciled and/or revised patient medications in the EMR. I have discussed each diagnosis listed in this note with Maddy Ng and/or their family. I have discussed treatment options and the risk/benefit analysis of those options, including safe use of medications and possible medication side effects. Through the use of shared decision making we have agreed to the above plan. The patient has received an after-visit summary and questions were answered concerning future plans. Sylvie Monroy, JESICA-C This note will not be viewable in [address]

## 2019-04-20 LAB
25(OH)D3+25(OH)D2 SERPL-MCNC: 35.1 NG/ML (ref 30–100)
ALBUMIN SERPL-MCNC: 4.4 G/DL (ref 3.5–5.5)
ALBUMIN/GLOB SERPL: 1.6 {RATIO} (ref 1.2–2.2)
ALP SERPL-CCNC: 76 IU/L (ref 39–117)
ALT SERPL-CCNC: 19 IU/L (ref 0–32)
AST SERPL-CCNC: 19 IU/L (ref 0–40)
BASOPHILS # BLD AUTO: 0 X10E3/UL (ref 0–0.2)
BASOPHILS NFR BLD AUTO: 0 %
BILIRUB SERPL-MCNC: 0.4 MG/DL (ref 0–1.2)
BUN SERPL-MCNC: 12 MG/DL (ref 6–24)
BUN/CREAT SERPL: 22 (ref 9–23)
CALCIUM SERPL-MCNC: 9.2 MG/DL (ref 8.7–10.2)
CHLORIDE SERPL-SCNC: 106 MMOL/L (ref 96–106)
CHOLEST SERPL-MCNC: 241 MG/DL (ref 100–199)
CO2 SERPL-SCNC: 26 MMOL/L (ref 20–29)
CREAT SERPL-MCNC: 0.55 MG/DL (ref 0.57–1)
EOSINOPHIL # BLD AUTO: 0.1 X10E3/UL (ref 0–0.4)
EOSINOPHIL NFR BLD AUTO: 1 %
ERYTHROCYTE [DISTWIDTH] IN BLOOD BY AUTOMATED COUNT: 15.9 % (ref 12.3–15.4)
GLOBULIN SER CALC-MCNC: 2.7 G/DL (ref 1.5–4.5)
GLUCOSE SERPL-MCNC: 91 MG/DL (ref 65–99)
HCT VFR BLD AUTO: 43.5 % (ref 34–46.6)
HDLC SERPL-MCNC: 48 MG/DL
HGB BLD-MCNC: 14 G/DL (ref 11.1–15.9)
IMM GRANULOCYTES # BLD AUTO: 0 X10E3/UL (ref 0–0.1)
IMM GRANULOCYTES NFR BLD AUTO: 0 %
INTERPRETATION, 910389: NORMAL
LDLC SERPL CALC-MCNC: 176 MG/DL (ref 0–99)
LYMPHOCYTES # BLD AUTO: 1.7 X10E3/UL (ref 0.7–3.1)
LYMPHOCYTES NFR BLD AUTO: 25 %
MCH RBC QN AUTO: 29.7 PG (ref 26.6–33)
MCHC RBC AUTO-ENTMCNC: 32.2 G/DL (ref 31.5–35.7)
MCV RBC AUTO: 92 FL (ref 79–97)
MONOCYTES # BLD AUTO: 0.4 X10E3/UL (ref 0.1–0.9)
MONOCYTES NFR BLD AUTO: 6 %
NEUTROPHILS # BLD AUTO: 4.7 X10E3/UL (ref 1.4–7)
NEUTROPHILS NFR BLD AUTO: 68 %
PLATELET # BLD AUTO: 261 X10E3/UL (ref 150–379)
POTASSIUM SERPL-SCNC: 4.5 MMOL/L (ref 3.5–5.2)
PROT SERPL-MCNC: 7.1 G/DL (ref 6–8.5)
RBC # BLD AUTO: 4.71 X10E6/UL (ref 3.77–5.28)
SODIUM SERPL-SCNC: 146 MMOL/L (ref 134–144)
TRIGL SERPL-MCNC: 83 MG/DL (ref 0–149)
VLDLC SERPL CALC-MCNC: 17 MG/DL (ref 5–40)
WBC # BLD AUTO: 7 X10E3/UL (ref 3.4–10.8)

## 2019-04-25 ENCOUNTER — TELEPHONE (OUTPATIENT)
Dept: FAMILY MEDICINE CLINIC | Age: 54
End: 2019-04-25

## 2019-04-25 NOTE — TELEPHONE ENCOUNTER
----- Message from Maritza Munroe sent at 4/25/2019  2:48 PM EDT -----  Regarding: Np.Eliana/Telephone  Pt requesting a call back regarding insurance not covering Rx for high chlostreol (unsure of name and mg).  Best [phone]

## 2019-05-02 ENCOUNTER — TELEPHONE (OUTPATIENT)
Dept: FAMILY MEDICINE CLINIC | Age: 54
End: 2019-05-02

## 2019-05-02 NOTE — TELEPHONE ENCOUNTER
Pt would like to know what the name of the rheumatologist that Marbella Augustine suggested at last appointment. Informed pt still working on [place] for [address] [place] [address]. Informed pt would call with update next week when Cora Patton NP is back in the office. Pt verbalized understanding.

## 2019-05-06 NOTE — TELEPHONE ENCOUNTER
I think patient wanted [place] location of rheumatology. Check with laura to see which docs are in that area.   If she is willing to travel she can see Dr. Tom Baca

## 2019-05-13 NOTE — PROGRESS NOTES
RECOMMENDATIONS: 
Hope to get Stepan Mortensen approved for help lower cholesterol. Vitamin D normal.  Liver and kidney function normal.  Blood counts normal (not anemic).

## 2019-05-17 ENCOUNTER — TELEPHONE (OUTPATIENT)
Dept: FAMILY MEDICINE CLINIC | Age: 54
End: 2019-05-17

## 2019-06-04 ENCOUNTER — HOSPITAL ENCOUNTER (EMERGENCY)
Age: 54
Discharge: HOME | End: 2019-06-05
Attending: EMERGENCY MEDICINE
Payer: COMMERCIAL

## 2019-06-04 DIAGNOSIS — R19.7 DIARRHEA, UNSPECIFIED TYPE: ICD-10-CM

## 2019-06-04 DIAGNOSIS — R42 DIZZINESS: Primary | ICD-10-CM

## 2019-06-04 DIAGNOSIS — R07.9 CHEST PAIN, UNSPECIFIED TYPE: ICD-10-CM

## 2019-06-04 DIAGNOSIS — R11.0 NAUSEA WITHOUT VOMITING: ICD-10-CM

## 2019-06-04 LAB
ALBUMIN SERPL-MCNC: 3.9 G/DL (ref 3.5–5)
ALBUMIN/GLOB SERPL: 1 {RATIO} (ref 1.1–2.2)
ALP SERPL-CCNC: 88 U/L (ref 45–117)
ALT SERPL-CCNC: 47 U/L (ref 12–78)
ANION GAP SERPL CALC-SCNC: 5 MMOL/L (ref 5–15)
APPEARANCE UR: CLEAR
AST SERPL-CCNC: 42 U/L (ref 15–37)
BACTERIA URNS QL MICRO: NEGATIVE /HPF
BASOPHILS # BLD: 0 K/UL (ref 0–0.1)
BASOPHILS NFR BLD: 0 % (ref 0–1)
BILIRUB SERPL-MCNC: 0.4 MG/DL (ref 0.2–1)
BILIRUB UR QL: NEGATIVE
BUN SERPL-MCNC: 9 MG/DL (ref 6–20)
BUN/CREAT SERPL: 14 (ref 12–20)
CALCIUM SERPL-MCNC: 9.4 MG/DL (ref 8.5–10.1)
CHLORIDE SERPL-SCNC: 111 MMOL/L (ref 97–108)
CK MB CFR SERPL CALC: 1.6 % (ref 0–2.5)
CK MB SERPL-MCNC: 4.9 NG/ML (ref 5–25)
CK SERPL-CCNC: 312 U/L (ref 26–192)
CO2 SERPL-SCNC: 28 MMOL/L (ref 21–32)
COLOR UR: NORMAL
COMMENT, HOLDF: NORMAL
CREAT SERPL-MCNC: 0.63 MG/DL (ref 0.55–1.02)
DIFFERENTIAL METHOD BLD: ABNORMAL
EOSINOPHIL # BLD: 0.1 K/UL (ref 0–0.4)
EOSINOPHIL NFR BLD: 1 % (ref 0–7)
EPITH CASTS URNS QL MICRO: NORMAL /LPF
ERYTHROCYTE [DISTWIDTH] IN BLOOD BY AUTOMATED COUNT: 14.9 % (ref 11.5–14.5)
GLOBULIN SER CALC-MCNC: 4.1 G/DL (ref 2–4)
GLUCOSE SERPL-MCNC: 110 MG/DL (ref 65–100)
GLUCOSE UR STRIP.AUTO-MCNC: NEGATIVE MG/DL
HCT VFR BLD AUTO: 42.8 % (ref 35–47)
HGB BLD-MCNC: 13.9 G/DL (ref 11.5–16)
HGB UR QL STRIP: NEGATIVE
HYALINE CASTS URNS QL MICRO: NORMAL /LPF (ref 0–5)
IMM GRANULOCYTES # BLD AUTO: 0 K/UL (ref 0–0.04)
IMM GRANULOCYTES NFR BLD AUTO: 0 % (ref 0–0.5)
KETONES UR QL STRIP.AUTO: NEGATIVE MG/DL
LEUKOCYTE ESTERASE UR QL STRIP.AUTO: NEGATIVE
LYMPHOCYTES # BLD: 2 K/UL (ref 0.8–3.5)
LYMPHOCYTES NFR BLD: 20 % (ref 12–49)
MCH RBC QN AUTO: 30.8 PG (ref 26–34)
MCHC RBC AUTO-ENTMCNC: 32.5 G/DL (ref 30–36.5)
MCV RBC AUTO: 94.7 FL (ref 80–99)
MONOCYTES # BLD: 0.5 K/UL (ref 0–1)
MONOCYTES NFR BLD: 5 % (ref 5–13)
NEUTS SEG # BLD: 7.5 K/UL (ref 1.8–8)
NEUTS SEG NFR BLD: 74 % (ref 32–75)
NITRITE UR QL STRIP.AUTO: NEGATIVE
NRBC # BLD: 0 K/UL (ref 0–0.01)
NRBC BLD-RTO: 0 PER 100 WBC
PH UR STRIP: 6.5 [PH] (ref 5–8)
PLATELET # BLD AUTO: 236 K/UL (ref 150–400)
PMV BLD AUTO: 10.3 FL (ref 8.9–12.9)
POTASSIUM SERPL-SCNC: 3.6 MMOL/L (ref 3.5–5.1)
PROT SERPL-MCNC: 8 G/DL (ref 6.4–8.2)
PROT UR STRIP-MCNC: NEGATIVE MG/DL
RBC # BLD AUTO: 4.52 M/UL (ref 3.8–5.2)
RBC #/AREA URNS HPF: NORMAL /HPF (ref 0–5)
SAMPLES BEING HELD,HOLD: NORMAL
SODIUM SERPL-SCNC: 144 MMOL/L (ref 136–145)
SP GR UR REFRACTOMETRY: 1.01 (ref 1–1.03)
TROPONIN I SERPL-MCNC: <0.05 NG/ML
UR CULT HOLD, URHOLD: NORMAL
UROBILINOGEN UR QL STRIP.AUTO: 1 EU/DL (ref 0.2–1)
WBC # BLD AUTO: 10.1 K/UL (ref 3.6–11)
WBC URNS QL MICRO: NORMAL /HPF (ref 0–4)

## 2019-06-04 PROCEDURE — 82550 ASSAY OF CK (CPK): CPT

## 2019-06-04 PROCEDURE — 93005 ELECTROCARDIOGRAM TRACING: CPT

## 2019-06-04 PROCEDURE — 99284 EMERGENCY DEPT VISIT MOD MDM: CPT

## 2019-06-04 PROCEDURE — 85025 COMPLETE CBC W/AUTO DIFF WBC: CPT

## 2019-06-04 PROCEDURE — 81001 URINALYSIS AUTO W/SCOPE: CPT

## 2019-06-04 PROCEDURE — 80053 COMPREHEN METABOLIC PANEL: CPT

## 2019-06-04 PROCEDURE — 84484 ASSAY OF TROPONIN QUANT: CPT

## 2019-06-04 PROCEDURE — 36415 COLL VENOUS BLD VENIPUNCTURE: CPT

## 2019-06-05 ENCOUNTER — APPOINTMENT (OUTPATIENT)
Dept: GENERAL RADIOLOGY | Age: 54
End: 2019-06-05
Attending: PHYSICIAN ASSISTANT
Payer: COMMERCIAL

## 2019-06-05 ENCOUNTER — APPOINTMENT (OUTPATIENT)
Dept: CT IMAGING | Age: 54
End: 2019-06-05
Attending: PHYSICIAN ASSISTANT
Payer: COMMERCIAL

## 2019-06-05 VITALS
SYSTOLIC BLOOD PRESSURE: 174 MMHG | TEMPERATURE: 98.5 F | OXYGEN SATURATION: 100 % | HEIGHT: 59 IN | HEART RATE: 87 BPM | BODY MASS INDEX: 25.24 KG/M2 | DIASTOLIC BLOOD PRESSURE: 83 MMHG | WEIGHT: 125.22 LBS | RESPIRATION RATE: 16 BRPM

## 2019-06-05 LAB
ATRIAL RATE: 100 BPM
CALCULATED P AXIS, ECG09: 75 DEGREES
CALCULATED R AXIS, ECG10: 55 DEGREES
CALCULATED T AXIS, ECG11: 31 DEGREES
DIAGNOSIS, 93000: NORMAL
P-R INTERVAL, ECG05: 142 MS
Q-T INTERVAL, ECG07: 340 MS
QRS DURATION, ECG06: 84 MS
QTC CALCULATION (BEZET), ECG08: 438 MS
TROPONIN I BLD-MCNC: <0.04 NG/ML (ref 0–0.08)
VENTRICULAR RATE, ECG03: 100 BPM

## 2019-06-05 PROCEDURE — 96374 THER/PROPH/DIAG INJ IV PUSH: CPT

## 2019-06-05 PROCEDURE — 96361 HYDRATE IV INFUSION ADD-ON: CPT

## 2019-06-05 PROCEDURE — 71046 X-RAY EXAM CHEST 2 VIEWS: CPT

## 2019-06-05 PROCEDURE — 70450 CT HEAD/BRAIN W/O DYE: CPT

## 2019-06-05 PROCEDURE — 84484 ASSAY OF TROPONIN QUANT: CPT

## 2019-06-05 PROCEDURE — 74011250636 HC RX REV CODE- 250/636: Performed by: PHYSICIAN ASSISTANT

## 2019-06-05 RX ORDER — ONDANSETRON 2 MG/ML
4 INJECTION INTRAMUSCULAR; INTRAVENOUS
Status: COMPLETED | OUTPATIENT
Start: 2019-06-05 | End: 2019-06-05

## 2019-06-05 RX ORDER — ONDANSETRON 4 MG/1
4 TABLET, ORALLY DISINTEGRATING ORAL
Qty: 10 TAB | Refills: 0 | Status: SHIPPED | OUTPATIENT
Start: 2019-06-05 | End: 2019-11-26 | Stop reason: ALTCHOICE

## 2019-06-05 RX ORDER — SODIUM CHLORIDE 9 MG/ML
1000 INJECTION, SOLUTION INTRAVENOUS ONCE
Status: COMPLETED | OUTPATIENT
Start: 2019-06-05 | End: 2019-06-05

## 2019-06-05 RX ADMIN — ONDANSETRON 4 MG: 2 INJECTION INTRAMUSCULAR; INTRAVENOUS at 00:44

## 2019-06-05 RX ADMIN — SODIUM CHLORIDE 1000 ML/HR: 900 INJECTION, SOLUTION INTRAVENOUS at 00:44

## 2019-06-05 NOTE — ED PROVIDER NOTES
This is a 47year-old  female with medical history remarkable for osteopenia, RA, carotid stenosis bilaterally, aortic valve stenosis, and hypercholesterolemia presents ambulatory to the emergency department with complaint of a two-day history of episodic dizziness described as a very brief period lasting seconds of feeling dizzy with spinning sensation which resolves without intervention. She also reports nausea episodically. No episodes of vomiting. There has been some diarrhea. She is a childcare provider. No specific ill contacts with similar. She denies any recent travel or antibiotic therapy. She had a brief episode of midsternal chest pain described as pressure that occurred earlier in the evening and lasted for a few minutes and resolved. There was no associated diaphoresis, extremity numbness, extremity weakness, headache, lower extremity edema, or shortness of breath. She mentions starting Fosamax approximately 3 weeks ago. She questions if the symptoms are secondary to medication. She also mentions that she typically consumes several drinks of mountain dew daily. Chest Pain (Angina)    Associated symptoms include dizziness and nausea. Pertinent negatives include no abdominal pain, no cough, no fever, no headaches, no numbness, no shortness of breath, no vomiting and no weakness. Dizziness   Associated symptoms include chest pain and nausea. Pertinent negatives include no shortness of breath, no vomiting, no confusion and no headaches.         Past Medical History:   Diagnosis Date    Aortic valvar stenosis 2/23/2015    Carotid stenosis, bilateral     CMT (Charcot Louise Tooth) disease     High risk for fracture due to osteoporosis by DEXA scan 2/23/2015    Hypercholesteremia 3/15/2010    Osteopenia     RA (rheumatoid arthritis) ([facility] [address] 3/15/2010       Past Surgical History:   Procedure Laterality Date    HX GYN      BTL    RENAL SCOPE,REMV FB/STONE           Family History: Problem Relation Age of Onset    Thyroid Disease Mother     Elevated Lipids Mother     Heart Disease Mother     Stroke Mother 48    Cancer Maternal Aunt         breast    Breast Cancer Maternal Aunt     Cancer Maternal Grandmother         throat/was snuff user       Social History     Socioeconomic History    Marital status:      Spouse name: Not on file    Number of children: Not on file    Years of education: Not on file    Highest education level: Not on file   Occupational History    Not on file   Social Needs    Financial resource strain: Not on file    Food insecurity:     Worry: Not on file     Inability: Not on file    Transportation needs:     Medical: Not on file     Non-medical: Not on file   Tobacco Use    Smoking status: Never Smoker    Smokeless tobacco: Never Used   Substance and Sexual Activity    Alcohol use: No     Alcohol/week: 0.0 oz    Drug use: No    Sexual activity: Not on file   Lifestyle    Physical activity:     Days per week: Not on file     Minutes per session: Not on file    Stress: Not on file   Relationships    Social connections:     Talks on phone: Not on file     Gets together: Not on file     Attends [employer] service: Not on file     Active member of club or organization: Not on file     Attends meetings of clubs or organizations: Not on file     Relationship status: Not on file    Intimate partner violence:     Fear of current or ex partner: Not on file     Emotionally abused: Not on file     Physically abused: Not on file     Forced sexual activity: Not on file   Other Topics Concern    Not on file   Social History Narrative    Not on file         ALLERGIES: Flomax [tamsulosin]; Percocet [oxycodone-acetaminophen]; Lipitor [atorvastatin]; Zetia [ezetimibe]; Gabapentin; Naproxen; and Prednisone    Review of Systems   Constitutional: Negative. Negative for chills, fatigue and fever. HENT: Negative.   Negative for congestion, ear pain, rhinorrhea, sneezing and sore throat. Respiratory: Negative for cough and shortness of breath. Cardiovascular: Positive for chest pain. Negative for leg swelling. Gastrointestinal: Positive for abdominal distention, diarrhea and nausea. Negative for abdominal pain, constipation and vomiting. Genitourinary: Negative for difficulty urinating, frequency and urgency. Musculoskeletal: Negative for neck pain and neck stiffness. Neurological: Positive for dizziness. Negative for tremors, syncope, weakness, numbness and headaches. Psychiatric/Behavioral: Negative for confusion and decreased concentration. All other systems reviewed and are negative. Vitals:    06/04/19 2059 06/04/19 2117 06/04/19 2238   BP:   (!) 196/96   Pulse: (!) 107  96   Resp:   18   Temp:   98.8 °F (37.1 °C)   SpO2: 100%  100%   Weight:  56.8 kg (125 lb 3.5 oz)    Height:  4' 11\" (1.499 m)             Physical Exam   Constitutional: She is oriented to person, place, and time. She appears well-developed and well-nourished. No distress. Well appearing  female in NAD   HENT:   Head: Normocephalic and atraumatic. Left Ear: External ear normal.   Eyes: Pupils are equal, round, and reactive to light. Conjunctivae and EOM are normal.   Neck: Normal range of motion. Neck supple. Cardiovascular: Normal rate and regular rhythm. Murmur heard. Pulmonary/Chest: Effort normal. No respiratory distress. She has no wheezes. Abdominal: Soft. Bowel sounds are normal. She exhibits no distension. There is no tenderness. There is no rebound. Musculoskeletal: Normal range of motion. Neurological: She is alert and oriented to person, place, and time. She is not disoriented. No cranial nerve deficit.   extremity strength and sensation intact, nml gait, no facial droop   Skin: Skin is warm and dry. No ecchymosis, no laceration and no lesion noted. Nursing note and vitals reviewed.        MDM  Number of Diagnoses or Management Options  Diagnosis management comments: 46 yo  female with complaint of episodic dizziness over past week with nausea and diarrhea in past 24 hrs. Also reports some resolved CP. Appears comfortable currently with stable vitals and relatively benign exam. However has risk factors for ACS. No dizziness or CP currently. Plan  EKg  Trop  Xray chest  CBC  CMP  UA  Ct head  IVF  Reassess. Familia Barfield         Amount and/or Complexity of Data Reviewed  Clinical lab tests: ordered and reviewed  Tests in the radiology section of CPT®: ordered and reviewed  Independent visualization of images, tracings, or specimens: yes           Procedures      Progress note      EKG interpretation  Rhythm: normal sinus rhythm; and regular . Rate (approx.): 100; Axis: normal; P wave: normal; QRS interval: normal ; ST/T wave: non-specific changes. Familia Barfield    Trop - x two. No CP, nausea or diarrhea  while in ED. Feeling much better. Ambulatory through ED with steady gait.  Zena Batista [place]

## 2019-06-05 NOTE — DISCHARGE INSTRUCTIONS

## 2019-06-05 NOTE — ED TRIAGE NOTES
Pt reports dizziness after starting Fosamax 70mg last week. Took second dose this week, now dizziness is worse, chest discomfort, nausea and diarrhea.

## 2019-07-19 ENCOUNTER — TELEPHONE (OUTPATIENT)
Dept: FAMILY MEDICINE CLINIC | Age: 54
End: 2019-07-19

## 2019-07-19 ENCOUNTER — OFFICE VISIT (OUTPATIENT)
Dept: FAMILY MEDICINE CLINIC | Age: 54
End: 2019-07-19

## 2019-07-19 VITALS
BODY MASS INDEX: 24.8 KG/M2 | DIASTOLIC BLOOD PRESSURE: 67 MMHG | HEART RATE: 87 BPM | RESPIRATION RATE: 20 BRPM | HEIGHT: 59 IN | WEIGHT: 123 LBS | OXYGEN SATURATION: 96 % | SYSTOLIC BLOOD PRESSURE: 156 MMHG | TEMPERATURE: 98.5 F

## 2019-07-19 DIAGNOSIS — M81.0 OSTEOPOROSIS, UNSPECIFIED OSTEOPOROSIS TYPE, UNSPECIFIED PATHOLOGICAL FRACTURE PRESENCE: ICD-10-CM

## 2019-07-19 DIAGNOSIS — B35.1 ONYCHOMYCOSIS: Primary | ICD-10-CM

## 2019-07-19 DIAGNOSIS — E78.5 DYSLIPIDEMIA, GOAL LDL BELOW 100: Chronic | ICD-10-CM

## 2019-07-19 DIAGNOSIS — K21.9 GASTROESOPHAGEAL REFLUX DISEASE, ESOPHAGITIS PRESENCE NOT SPECIFIED: ICD-10-CM

## 2019-07-19 DIAGNOSIS — R11.0 NAUSEA: ICD-10-CM

## 2019-07-19 RX ORDER — ITRACONAZOLE 100 MG/1
CAPSULE ORAL
Qty: 84 CAP | Refills: 0 | Status: SHIPPED | OUTPATIENT
Start: 2019-07-19 | End: 2019-11-26 | Stop reason: ALTCHOICE

## 2019-07-19 RX ORDER — PHENOL/SODIUM PHENOLATE
20 AEROSOL, SPRAY (ML) MUCOUS MEMBRANE DAILY
Qty: 30 TAB | Refills: 2 | Status: SHIPPED | OUTPATIENT
Start: 2019-07-19 | End: 2019-10-23 | Stop reason: ALTCHOICE

## 2019-07-19 RX ORDER — ROSUVASTATIN CALCIUM 40 MG/1
TABLET, COATED ORAL
Qty: 30 TAB | Refills: 11 | Status: SHIPPED | OUTPATIENT
Start: 2019-07-19 | End: 2020-08-13

## 2019-07-19 NOTE — PROGRESS NOTES
HISTORY OF PRESENT ILLNESS  Shawn Tucker is a 47 y.o. female. HPI  Patient comes in today for nail problem and medication discussion  Sees rheumatology on Monday for RA. Still interested in seeking 2nd opinion. repatha is $312 monthly. She is submitting for assistance or coupon. States 2 toenails are yellow and thickened. Denies pain. Does not use salon for pedicure, no gym showers. Has not seen podiatry. States she had a reaction to Fosamax. Would like alternative.   Allergies   Allergen Reactions    Flomax [Tamsulosin] Other (comments)     Severe headache    Percocet [Oxycodone-Acetaminophen] Nausea and Vomiting and Other (comments)     dizziness    Lipitor [Atorvastatin] Other (comments)     Dizzy and nausea    Zetia [Ezetimibe] Other (comments)     headaches    Gabapentin Nausea and Vomiting     dizziness    Naproxen Nausea and Vomiting    Prednisone Nausea and Vomiting       Past Medical History:   Diagnosis Date    Aortic valvar stenosis 2/23/2015    Carotid stenosis, bilateral     CMT (Charcot Louise Tooth) disease     High risk for fracture due to osteoporosis by DEXA scan 2/23/2015    Hypercholesteremia 3/15/2010    Osteopenia     RA (rheumatoid arthritis) ([facility] [address] 3/15/2010       Past Surgical History:   Procedure Laterality Date    HX GYN      BTL    RENAL SCOPE,REMV FB/STONE         Social History     Socioeconomic History    Marital status:      Spouse name: Not on file    Number of children: Not on file    Years of education: Not on file    Highest education level: Not on file   Occupational History    Not on file   Social Needs    Financial resource strain: Not on file    Food insecurity:     Worry: Not on file     Inability: Not on file    Transportation needs:     Medical: Not on file     Non-medical: Not on file   Tobacco Use    Smoking status: Never Smoker    Smokeless tobacco: Never Used   Substance and Sexual Activity    Alcohol use: No     Alcohol/week: 0.0 standard drinks    Drug use: No    Sexual activity: Not on file   Lifestyle    Physical activity:     Days per week: Not on file     Minutes per session: Not on file    Stress: Not on file   Relationships    Social connections:     Talks on phone: Not on file     Gets together: Not on file     Attends Latter-day service: Not on file     Active member of club or organization: Not on file     Attends meetings of clubs or organizations: Not on file     Relationship status: Not on file    Intimate partner violence:     Fear of current or ex partner: Not on file     Emotionally abused: Not on file     Physically abused: Not on file     Forced sexual activity: Not on file   Other Topics Concern    Not on file   Social History Narrative    Not on file       Family History   Problem Relation Age of Onset    Thyroid Disease Mother     Elevated Lipids Mother     Heart Disease Mother     Stroke Mother 48    Cancer Maternal Aunt         breast    Breast Cancer Maternal Aunt     Cancer Maternal Grandmother         throat/was snuff user       Current Outpatient Medications   Medication Sig    evolocumab (REPATHA SYRINGE) syringe 1 mL by SubCUTAneous route every fourteen (14) days.  rosuvastatin (CRESTOR) 40 mg tablet TAKE 1 TABLET BY MOUTH AT BEDTIME    methocarbamol (ROBAXIN) 500 mg tablet TAKE 1 TABLET BY MOUTH 3 TIMES A DAY AS NEEDED FOR NECK PAIN    methotrexate (RHEUMATREX) 2.5 mg tablet 2.5 mg. Take 6 Tablets weekly.  folic acid (FOLVITE) 1 mg tablet Take 1 mg by mouth daily.  ondansetron (ZOFRAN ODT) 4 mg disintegrating tablet Take 1 Tab by mouth every eight (8) hours as needed for Nausea.  alendronate (FOSAMAX) 70 mg tablet Take 1 Tab by mouth every seven (7) days.  calcium citrate-vitamin D3 (CITRACAL WITH VITAMIN D MAXIMUM) tablet Take 1 Tab by mouth two (2) times a day. No current facility-administered medications for this visit. Review of Systems   Respiratory: Negative. Cardiovascular: Negative. Gastrointestinal: Positive for abdominal pain (epigastric), heartburn and nausea. Negative for blood in stool, melena and vomiting. Genitourinary: Negative. Skin:        Toenail discoloration   Neurological: Negative. Vitals:    07/19/19 1230   BP: 156/67   Pulse: 87   Resp: 20   Temp: 98.5 °F (36.9 °C)   TempSrc: Oral   SpO2: 96%   Weight: 123 lb (55.8 kg)   Height: 4' 11\" (1.499 m)     Physical Exam   Constitutional: She is oriented to person, place, and time. She appears well-developed and well-nourished. Cardiovascular: Normal rate. Pulmonary/Chest: Effort normal.   Abdominal: Soft. Normal appearance and bowel sounds are normal. There is tenderness in the epigastric area. Neurological: She is alert and oriented to person, place, and time. Skin: Skin is warm and dry. Right 2nd toenail and left great toenail thickened, right 2nd toenail curved over end of nail, both with yellow/brownish discoloration. Psychiatric: She has a normal mood and affect. Her behavior is normal. Judgment and thought content normal.     ASSESSMENT and PLAN    ICD-10-CM ICD-9-CM    1. Onychomycosis B35.1 110.1 itraconazole (SPORONAX) 100 mg capsule   2. Osteoporosis, unspecified osteoporosis type, unspecified pathological fracture presence M81.0 733.00 denosumab (PROLIA) 60 mg/mL injection   3. Gastroesophageal reflux disease, esophagitis presence not specified K21.9 530.81 Omeprazole delayed release (PRILOSEC D/R) 20 mg tablet   4. Nausea R11.0 787.02 Omeprazole delayed release (PRILOSEC D/R) 20 mg tablet   5. Dyslipidemia, goal LDL below 100 E78.5 272.4 rosuvastatin (CRESTOR) 40 mg tablet      evolocumab (REPATHA SYRINGE) syringe     Encounter Diagnoses   Name Primary?     Onychomycosis Yes    Osteoporosis, unspecified osteoporosis type, unspecified pathological fracture presence     Gastroesophageal reflux disease, esophagitis presence not specified     Nausea     Dyslipidemia, goal LDL below 100      Orders Placed This Encounter    rosuvastatin (CRESTOR) 40 mg tablet    evolocumab (REPATHA SYRINGE) syringe    denosumab (PROLIA) 60 mg/mL injection    itraconazole (SPORONAX) 100 mg capsule    Omeprazole delayed release (PRILOSEC D/R) 20 mg tablet     Diagnoses and all orders for this visit:    1. Onychomycosis - treat with pulse therapy of itraconazole. May use Kenrick's vapor rub topically on nails daily   -     itraconazole (SPORONAX) 100 mg capsule; For one week per month for 3 months    2. Osteoporosis, unspecified osteoporosis type, unspecified pathological fracture presence - had reaction to Fosamax. Patient interested in Prolia injection every 6 months  -     denosumab (PROLIA) 60 mg/mL injection; 1 mL by SubCUTAneous route once for 1 dose. 3. Gastroesophageal reflux disease, esophagitis presence not specified - trial prilosec for 30d. If no improvement, will increase and refer to GI. The pathophysiology of reflux is discussed.  Anti-reflux measures such as raising the head of the bed, avoiding tight clothing or belts, avoiding eating late at night and not lying down shortly after mealtime and achieving weight loss are discussed. Avoid ASA, NSAID's, caffeine, peppermints, alcohol and tobacco.   -     Omeprazole delayed release (PRILOSEC D/R) 20 mg tablet; Take 1 Tab by mouth daily. 4. Nausea  -     Omeprazole delayed release (PRILOSEC D/R) 20 mg tablet; Take 1 Tab by mouth daily. 5. Dyslipidemia, goal LDL below 100 - will continue to work on Repatha injection authorization  -     rosuvastatin (CRESTOR) 40 mg tablet; TAKE 1 TABLET BY MOUTH AT BEDTIME  -     evolocumab (REPATHA SYRINGE) syringe; 1 mL by SubCUTAneous route every fourteen (14) days. Patient has appt in Sept for follow up    I have reviewed the patient's allergies and made any necessary changes. Medical, procedural, social and family histories have been reviewed and updated as medically indicated.  I have reconciled and/or revised patient medications in the EMR. I have discussed each diagnosis listed in this note with Anjel Priest and/or their family. I have discussed treatment options and the risk/benefit analysis of those options, including safe use of medications and possible medication side effects. Through the use of shared decision making we have agreed to the above plan. The patient has received an after-visit summary and questions were answered concerning future plans. Sylvie Monroy, FNP-C    This note will not be viewable in [place].

## 2019-07-19 NOTE — PROGRESS NOTES
Verified patient with two types of identifiers. Verified pharmacy with patient. Verified medications with the patient. 1. Have you been to the ER, urgent care clinic since your last visit? Hospitalized since your last visit? Yes Reason for visit: dizziness    2. Have you seen or consulted any other health care providers outside of the [address] [place] [address] since your last visit? Include any pap smears or colon screening.  No

## 2019-07-23 ENCOUNTER — DOCUMENTATION ONLY (OUTPATIENT)
Dept: FAMILY MEDICINE CLINIC | Age: 54
End: 2019-07-23

## 2019-07-24 ENCOUNTER — DOCUMENTATION ONLY (OUTPATIENT)
Dept: FAMILY MEDICINE CLINIC | Age: 54
End: 2019-07-24

## 2019-07-24 ENCOUNTER — TELEPHONE (OUTPATIENT)
Dept: FAMILY MEDICINE CLINIC | Age: 54
End: 2019-07-24

## 2019-07-24 NOTE — TELEPHONE ENCOUNTER
Pharmacy called stating they did not not receive the intake form and would need that  before doing the verification. Pharmacy can be reached at [phone]  fax# [phone].

## 2019-07-24 NOTE — PROGRESS NOTES
Submitted PA for itraconazole through covermymeds ([id number]). Received Approval. Pharm notified.

## 2019-07-25 NOTE — TELEPHONE ENCOUNTER
I can send Lamisil once daily by mouth for 12 weeks to treat toenail fungus.   She would need to have liver enzymes checked once monthly    Other option would be to send Jenet Mayor, which is a solution that can be painted on toenails daily for 48 weeks

## 2019-07-26 ENCOUNTER — TELEPHONE (OUTPATIENT)
Dept: FAMILY MEDICINE CLINIC | Age: 54
End: 2019-07-26

## 2019-07-26 NOTE — TELEPHONE ENCOUNTER
Spoke with representative with [facility]. She stated they had received form but not all information was filled out. Filled out remainder of the information and faxed once more.

## 2019-07-26 NOTE — TELEPHONE ENCOUNTER
Patient called stating that she is following up on the itraconazole (SPORONAX) 100 mg capsule and on the   evolocumab (REPATHA SYRINGE) syringe. Patient stated that [employer] has been reaching out to the office and has not heard a response. Patient stated that the Sporonax is not covered and something else needs to be called in or a PA. Patient can be reached at [phone].

## 2019-07-30 ENCOUNTER — TELEPHONE (OUTPATIENT)
Dept: FAMILY MEDICINE CLINIC | Age: 54
End: 2019-07-30

## 2019-07-30 NOTE — TELEPHONE ENCOUNTER
Addended by: KRISTEN VERA on: 9/4/2018 03:11 PM     Modules accepted: Orders     Verified patient with two type of identifiers. Pt states already contacted Repatha and is waiting on card. Pt states [employer] will be contacting office. Informed pt would be looking out for call.

## 2019-07-30 NOTE — TELEPHONE ENCOUNTER
Spoke with representative with [employer]. Informed that pt would need to contact [employer] patient assistance and qualify for [address] card. Pt needs to contact them at [phone]    Left Message for patient to return call to office.

## 2019-07-30 NOTE — TELEPHONE ENCOUNTER
----- Message from Arturo Balderrama sent at 7/30/2019 12:42 PM EDT -----  Regarding: TRENA Monroy   General Message/Vendor Calls    Caller's first and last name: Thong Russo from [facility]       Reason for call: Repaxa       Callback required yes/no and why: N/a      Best contact number(s): [phone] open M-F 9am-9pm [facility]      Details to clarify the request: Thong Russo, from [facility], stated that for the Repaxa Rx that pt has a $250 deductible and co-pay will be $406          Arturo Balderrama

## 2019-08-13 ENCOUNTER — HOSPITAL ENCOUNTER (OUTPATIENT)
Dept: INFUSION THERAPY | Age: 54
Discharge: HOME | End: 2019-08-13

## 2019-10-16 ENCOUNTER — TELEPHONE (OUTPATIENT)
Dept: FAMILY MEDICINE CLINIC | Age: 54
End: 2019-10-16

## 2019-10-16 NOTE — TELEPHONE ENCOUNTER
Patient would like to get a call from [facility] regarding her acid reflux she can be reached @ [phone]

## 2019-10-16 NOTE — TELEPHONE ENCOUNTER
Verified patient with two type of identifiers. Pt states omeprazole 20 is no longer working for reflux. Per Josiah Watts NP pt to try 40 mg and contact office in one week. Pt verbalized understanding.

## 2019-10-23 ENCOUNTER — TELEPHONE (OUTPATIENT)
Dept: FAMILY MEDICINE CLINIC | Age: 54
End: 2019-10-23

## 2019-10-23 DIAGNOSIS — K21.9 GASTROESOPHAGEAL REFLUX DISEASE, ESOPHAGITIS PRESENCE NOT SPECIFIED: Primary | ICD-10-CM

## 2019-10-23 RX ORDER — PANTOPRAZOLE SODIUM 40 MG/1
40 TABLET, DELAYED RELEASE ORAL DAILY
Qty: 90 TAB | Refills: 0 | Status: SHIPPED | OUTPATIENT
Start: 2019-10-23 | End: 2019-11-21 | Stop reason: ALTCHOICE

## 2019-10-23 NOTE — TELEPHONE ENCOUNTER
Verified patient with two type of identifiers. Pt states increased omeprazole to 40 mg and still feels \"extreme burning\" in the center of her chest. Pt states took medication 30 mins prior to eating a  cupcake this morning. Pt states its getting worse and is not sure what to do.

## 2019-10-23 NOTE — TELEPHONE ENCOUNTER
Patient called stating that she would like a call back regarding acid reflux. Patient can be reached at [phone].

## 2019-10-23 NOTE — TELEPHONE ENCOUNTER
Verified patient with two type of identifiers. Informed pt of suggestions per Magnolia Ortez NP. Pt verbalized understanding.

## 2019-10-23 NOTE — TELEPHONE ENCOUNTER
Switch prilosec to protonix. Patient should also see G - give her Dr. Barba Buerger number to schedule. .    Instruct patient to avoid ASA, NSAID's, caffeine, peppermints, alcohol and tobacco.      Orders Placed This Encounter   [address] [facility]     Referral Priority:   Routine     Referral Type:   Consultation     Referral Reason:   Specialty Services Required     Referral Location:   [facility]     Referred to Provider:   Selene Morel MD     Number of Visits Requested:   1    pantoprazole (PROTONIX) 40 mg tablet     Sig: Take 1 Tab by mouth daily.      Dispense:  90 Tab     Refill:  0

## 2019-10-23 NOTE — TELEPHONE ENCOUNTER
Noted.     Patient was previously prescribed Prilosec, which she had been taking for at least 3 months

## 2019-10-23 NOTE — TELEPHONE ENCOUNTER
Received call from pt's pharmacy informing us of a contraindications for pantoprazole and methotrexate. Pharmacist stated pantoprazole with decrease effectiveness of medication and levels need to be monitored.

## 2019-11-20 NOTE — TELEPHONE ENCOUNTER
Patient is requesting a RX refill     Omeprazole delayed release (PRILOSEC D/R) 20 mg tablet [[id number]] she can be reached @ [phone]

## 2019-11-21 RX ORDER — OMEPRAZOLE 40 MG/1
40 CAPSULE, DELAYED RELEASE ORAL DAILY
Qty: 90 CAP | Refills: 0 | Status: SHIPPED | OUTPATIENT
Start: 2019-11-21 | End: 2020-09-03 | Stop reason: ALTCHOICE

## 2019-11-21 NOTE — TELEPHONE ENCOUNTER
Verified patient with two type of identifiers. Pt states pantoprazole seems to be making her acid reflux worse pt would like a script for omeprazole 40 mg. Pt has GI appt 12/3/2019.

## 2019-11-26 ENCOUNTER — OFFICE VISIT (OUTPATIENT)
Dept: FAMILY MEDICINE CLINIC | Age: 54
End: 2019-11-26

## 2019-11-26 VITALS
BODY MASS INDEX: 23.75 KG/M2 | TEMPERATURE: 97.9 F | HEIGHT: 59 IN | OXYGEN SATURATION: 99 % | RESPIRATION RATE: 18 BRPM | DIASTOLIC BLOOD PRESSURE: 68 MMHG | WEIGHT: 117.8 LBS | SYSTOLIC BLOOD PRESSURE: 146 MMHG | HEART RATE: 99 BPM

## 2019-11-26 DIAGNOSIS — M81.0 OSTEOPOROSIS, UNSPECIFIED OSTEOPOROSIS TYPE, UNSPECIFIED PATHOLOGICAL FRACTURE PRESENCE: ICD-10-CM

## 2019-11-26 DIAGNOSIS — G60.0 CMT (CHARCOT-MARIE-TOOTH DISEASE): ICD-10-CM

## 2019-11-26 DIAGNOSIS — Z23 ENCOUNTER FOR IMMUNIZATION: ICD-10-CM

## 2019-11-26 DIAGNOSIS — E78.5 DYSLIPIDEMIA, GOAL LDL BELOW 100: Primary | ICD-10-CM

## 2019-11-26 DIAGNOSIS — M06.9 RHEUMATOID ARTHRITIS INVOLVING MULTIPLE SITES, UNSPECIFIED RHEUMATOID FACTOR PRESENCE: ICD-10-CM

## 2019-11-26 DIAGNOSIS — I10 ESSENTIAL HYPERTENSION, BENIGN: ICD-10-CM

## 2019-11-26 DIAGNOSIS — K21.9 GASTROESOPHAGEAL REFLUX DISEASE, ESOPHAGITIS PRESENCE NOT SPECIFIED: ICD-10-CM

## 2019-11-26 RX ORDER — METHOTREXATE 2.5 MG/1
2.5 TABLET ORAL
Qty: 24 TAB | Refills: 3 | Status: SHIPPED | OUTPATIENT
Start: 2019-12-01 | End: 2020-05-21 | Stop reason: ALTCHOICE

## 2019-11-26 NOTE — PROGRESS NOTES
Chief Complaint   Patient presents with    Cholesterol Problem     08/23/2019 started repatha. Pt did not have approval for bone injections. 1. Have you been to the ER, urgent care clinic since your last visit? Hospitalized since your last visit? No    2. Have you seen or consulted any other health care providers outside of the [address] [place] [address] since your last visit? Include any pap smears or colon screening. No     Pt accepts flu shot. Verbal Order with Readback given by Radha Shay NP for Influenza. Given in Right Deltoid without difficulty.     Health Maintenance Due   Topic Date Due    Shingrix Vaccine Age 50> (1 of 2) 11/09/2016    PAP AKA CERVICAL CYTOLOGY  02/23/2018    Influenza Age 5 to Adult  08/01/2019

## 2019-11-26 NOTE — Clinical Note
Can we see why her insurance denied Prolia. Maybe chat with drug rep to see if she has any ideas.   thanks

## 2019-11-26 NOTE — PROGRESS NOTES
HISTORY OF PRESENT ILLNESS  Diane Mason is a 49 y.o. female. HPI  Patient comes in today for cholesterol check  08/23/2019 started repatha. Patient states she was off crestor for about 1 week a couple weeks ago. Tries to follow low fat, low cholesterol diet. Pt did not have Prolia approval for bone injections. Patient had allergic reaction to Fosamax. She has a hx of osteoporosis. Will try to resubmit. States 2 toenails are yellow and thickened - have improved some since using vaseline on nails. Insurance would not approve jublia or sporonox. Denies pain. Does not use salon for pedicure, no gym showers. Pt would like to discuss different rheumatologist.  Currently sees Dr. Klarissa Marcano. Not happy with care, would like second opinion. States she has never had xrays done in his office.   Does not ever receive any results from labs or xrays  Allergies   Allergen Reactions    Flomax [Tamsulosin] Other (comments)     Severe headache    Percocet [Oxycodone-Acetaminophen] Nausea and Vomiting and Other (comments)     dizziness    Lipitor [Atorvastatin] Other (comments)     Dizzy and nausea    Zetia [Ezetimibe] Other (comments)     headaches    Gabapentin Nausea and Vomiting     dizziness    Naproxen Nausea and Vomiting    Prednisone Nausea and Vomiting       Past Medical History:   Diagnosis Date    Aortic valvar stenosis 2/23/2015    Carotid stenosis, bilateral     CMT (Charcot Louise Tooth) disease     High risk for fracture due to osteoporosis by DEXA scan 2/23/2015    Hypercholesteremia 3/15/2010    Osteopenia     RA (rheumatoid arthritis) ([facility] [address] 3/15/2010       Past Surgical History:   Procedure Laterality Date    HX GYN      BTL    RENAL SCOPE,REMV FB/STONE         Social History     Socioeconomic History    Marital status:      Spouse name: Not on file    Number of children: Not on file    Years of education: Not on file    Highest education level: Not on file   Occupational History    Not on file   Social Needs    Financial resource strain: Not on file    Food insecurity:     Worry: Not on file     Inability: Not on file    Transportation needs:     Medical: Not on file     Non-medical: Not on file   Tobacco Use    Smoking status: Never Smoker    Smokeless tobacco: Never Used   Substance and Sexual Activity    Alcohol use: No     Alcohol/week: 0.0 standard drinks    Drug use: No    Sexual activity: Not on file   Lifestyle    Physical activity:     Days per week: Not on file     Minutes per session: Not on file    Stress: Not on file   Relationships    Social connections:     Talks on phone: Not on file     Gets together: Not on file     Attends [employer] service: Not on file     Active member of club or organization: Not on file     Attends meetings of clubs or organizations: Not on file     Relationship status: Not on file    Intimate partner violence:     Fear of current or ex partner: Not on file     Emotionally abused: Not on file     Physically abused: Not on file     Forced sexual activity: Not on file   Other Topics Concern    Not on file   Social History Narrative    Not on file       Family History   Problem Relation Age of Onset    Thyroid Disease Mother     Elevated Lipids Mother     Heart Disease Mother     Stroke Mother 48    Cancer Maternal Aunt         breast    Breast Cancer Maternal Aunt     Cancer Maternal Grandmother         throat/was snuff user       Current Outpatient Medications   Medication Sig    omeprazole (PRILOSEC) 40 mg capsule Take 1 Cap by mouth daily.  rosuvastatin (CRESTOR) 40 mg tablet TAKE 1 TABLET BY MOUTH AT BEDTIME    evolocumab (REPATHA SYRINGE) syringe 1 mL by SubCUTAneous route every fourteen (14) days.  methocarbamol (ROBAXIN) 500 mg tablet TAKE 1 TABLET BY MOUTH 3 TIMES A DAY AS NEEDED FOR NECK PAIN    folic acid (FOLVITE) 1 mg tablet Take 1 mg by mouth daily.     calcium citrate-vitamin D3 (CITRACAL WITH VITAMIN D MAXIMUM) tablet Take 1 Tab by mouth two (2) times a day.  efinaconazole (JUBLIA) ilya topical solution Wait 10 min after bathing prior to application, apply 1-2 drops every day and spread on toenail, allow to dry (Patient not taking: Reported on 11/26/2019)    itraconazole (SPORONAX) 100 mg capsule For one week per month for 3 months (Patient not taking: Reported on 11/26/2019)    ondansetron (ZOFRAN ODT) 4 mg disintegrating tablet Take 1 Tab by mouth every eight (8) hours as needed for Nausea. (Patient not taking: Reported on 11/26/2019)    methotrexate (RHEUMATREX) 2.5 mg tablet 2.5 mg. Take 6 Tablets weekly. No current facility-administered medications for this visit. Review of Systems   Constitutional: Positive for weight loss. Negative for chills, fever and malaise/fatigue. Respiratory: Negative. Cardiovascular: Negative. Gastrointestinal: Positive for abdominal pain (epigastric), heartburn (some improvement with PPI) and nausea. Negative for blood in stool, melena and vomiting. Genitourinary: Negative. Skin:        Toenail discoloration   Neurological: Negative. Psychiatric/Behavioral: Negative. Vitals:    11/26/19 1151   BP: 160/66   Pulse: 99   Resp: 18   Temp: 97.9 °F (36.6 °C)   TempSrc: Oral   SpO2: 99%   Weight: 117 lb 12.8 oz (53.4 kg)   Height: 4' 11\" (1.499 m)     Physical Exam  Constitutional:       Appearance: Normal appearance. She is well-developed. Cardiovascular:      Rate and Rhythm: Normal rate. Pulmonary:      Effort: Pulmonary effort is normal.   Abdominal:      General: Bowel sounds are normal.      Palpations: Abdomen is soft. Tenderness: There is tenderness in the epigastric area. Feet:      Right foot:      Toenail Condition: Right toenails are abnormally thick. Fungal disease present. Left foot:      Toenail Condition: Left toenails are abnormally thick. Fungal disease present.      Comments: Right 2nd toenail and left great toenail thickened, right 2nd toenail curved over end of nail, both with yellow/brownish discoloration. Skin:     General: Skin is warm and dry. Neurological:      Mental Status: She is alert and oriented to person, place, and time. Psychiatric:         Behavior: Behavior normal.         Thought Content: Thought content normal.         Judgment: Judgment normal.       ASSESSMENT and PLAN    ICD-10-CM ICD-9-CM    1. Dyslipidemia, goal LDL below 100 E78.5 272.4 LIPID PANEL      CBC WITH AUTOMATED DIFF      METABOLIC PANEL, COMPREHENSIVE   2. Essential hypertension, benign I10 401.1    3. Rheumatoid arthritis involving multiple sites, unspecified rheumatoid factor presence (HCC) M06.9 714.0 REFERRAL TO RHEUMATOLOGY      methotrexate (RHEUMATREX) 2.5 mg tablet   4. Osteoporosis, unspecified osteoporosis type, unspecified pathological fracture presence M81.0 733.00 VITAMIN D, 25 HYDROXY   5. Gastroesophageal reflux disease, esophagitis presence not specified K21.9 530.81    6. Encounter for immunization Z23 V03.89 INFLUENZA VIRUS VAC QUAD,SPLIT,PRESV FREE SYRINGE IM      [place] IMMUNIZ ADMIN,1 SINGLE/COMB VAC/TOXOID   7. CMT (Charcot-Louise-Tooth disease) G60.0 356.1      Encounter Diagnoses   Name Primary?     Dyslipidemia, goal LDL below 100 Yes    Essential hypertension, benign     Rheumatoid arthritis involving multiple sites, unspecified rheumatoid factor presence (HCC)     Osteoporosis, unspecified osteoporosis type, unspecified pathological fracture presence     Gastroesophageal reflux disease, esophagitis presence not specified     Encounter for immunization     CMT (Charcot-Louise-Tooth disease)      Orders Placed This Encounter    Influenza virus vaccine (QUADRIVALENT PRES FREE SYRINGE) IM (69934)    LIPID PANEL    CBC WITH AUTOMATED DIFF    METABOLIC PANEL, COMPREHENSIVE    VITAMIN D, 25 HYDROXY    REFERRAL TO RHEUMATOLOGY    methotrexate (RHEUMATREX) 2.5 mg tablet     Diagnoses and all orders for this visit: 1. Dyslipidemia, goal LDL below 100 - has been on Repatha for 3 months. Check FLP  -     LIPID PANEL  -     CBC WITH AUTOMATED DIFF  -     METABOLIC PANEL, COMPREHENSIVE    2. Essential hypertension, benign- patient to monitor BP at pharmacy and send readings via [facility] [address]. May need low dose medication    3. Rheumatoid arthritis involving multiple sites, unspecified rheumatoid factor presence ([facility] [address] - patient to schedule own appt for second opinion  -     REFERRAL TO RHEUMATOLOGY  -     methotrexate (RHEUMATREX) 2.5 mg tablet; Take 1 Tab by mouth every Sunday. Take 6 Tablets weekly. 4. Osteoporosis, unspecified osteoporosis type, unspecified pathological fracture presence - DEXA in 2014 showed osteoporosis, some improvement in 2016, but had loss on bone density in 2018. Will try to seek Prolia approval again  -     VITAMIN D, 25 HYDROXY    5. Gastroesophageal reflux disease, esophagitis presence not specified - has appt with GI, taking prilosec with some relief. 6. Encounter for immunization  -     INFLUENZA VIRUS VAC QUAD,SPLIT,PRESV FREE SYRINGE IM  -     [place] IMMUNIZ ADMIN,1 SINGLE/COMB VAC/TOXOID    7. CMT (Charcot-Louise-Tooth disease)      Follow-up and Dispositions    · Return if symptoms worsen or fail to improve.       lab results and schedule of future lab studies reviewed with patient  reviewed diet, exercise and weight control  cardiovascular risk and specific lipid/LDL goals reviewed    I have reviewed the patient's allergies and made any necessary changes. Medical, procedural, social and family histories have been reviewed and updated as medically indicated. I have reconciled and/or revised patient medications in the EMR. I have discussed each diagnosis listed in this note with Lewis Adams and/or their family. I have discussed treatment options and the risk/benefit analysis of those options, including safe use of medications and possible medication side effects.   Through the use of shared decision making we have agreed to the above plan. The patient has received an after-visit summary and questions were answered concerning future plans. Sylvie Monroy, LIVEP-C    This note will not be viewable in [employer].

## 2019-11-27 LAB
25(OH)D3+25(OH)D2 SERPL-MCNC: 30.3 NG/ML (ref 30–100)
ALBUMIN SERPL-MCNC: 4.4 G/DL (ref 3.5–5.5)
ALBUMIN/GLOB SERPL: 1.8 {RATIO} (ref 1.2–2.2)
ALP SERPL-CCNC: 87 IU/L (ref 39–117)
ALT SERPL-CCNC: 13 IU/L (ref 0–32)
AST SERPL-CCNC: 19 IU/L (ref 0–40)
BASOPHILS # BLD AUTO: 0 X10E3/UL (ref 0–0.2)
BASOPHILS NFR BLD AUTO: 1 %
BILIRUB SERPL-MCNC: 0.4 MG/DL (ref 0–1.2)
BUN SERPL-MCNC: 8 MG/DL (ref 6–24)
BUN/CREAT SERPL: 13 (ref 9–23)
CALCIUM SERPL-MCNC: 9.2 MG/DL (ref 8.7–10.2)
CHLORIDE SERPL-SCNC: 103 MMOL/L (ref 96–106)
CHOLEST SERPL-MCNC: 213 MG/DL (ref 100–199)
CO2 SERPL-SCNC: 22 MMOL/L (ref 20–29)
CREAT SERPL-MCNC: 0.61 MG/DL (ref 0.57–1)
EOSINOPHIL # BLD AUTO: 0 X10E3/UL (ref 0–0.4)
EOSINOPHIL NFR BLD AUTO: 0 %
ERYTHROCYTE [DISTWIDTH] IN BLOOD BY AUTOMATED COUNT: 13.1 % (ref 12.3–15.4)
GLOBULIN SER CALC-MCNC: 2.5 G/DL (ref 1.5–4.5)
GLUCOSE SERPL-MCNC: 76 MG/DL (ref 65–99)
HCT VFR BLD AUTO: 42.3 % (ref 34–46.6)
HDLC SERPL-MCNC: 44 MG/DL
HGB BLD-MCNC: 13.9 G/DL (ref 11.1–15.9)
IMM GRANULOCYTES # BLD AUTO: 0 X10E3/UL (ref 0–0.1)
IMM GRANULOCYTES NFR BLD AUTO: 0 %
INTERPRETATION, 910389: NORMAL
LDLC SERPL CALC-MCNC: 156 MG/DL (ref 0–99)
LYMPHOCYTES # BLD AUTO: 1.5 X10E3/UL (ref 0.7–3.1)
LYMPHOCYTES NFR BLD AUTO: 21 %
MCH RBC QN AUTO: 30.3 PG (ref 26.6–33)
MCHC RBC AUTO-ENTMCNC: 32.9 G/DL (ref 31.5–35.7)
MCV RBC AUTO: 92 FL (ref 79–97)
MONOCYTES # BLD AUTO: 0.5 X10E3/UL (ref 0.1–0.9)
MONOCYTES NFR BLD AUTO: 7 %
NEUTROPHILS # BLD AUTO: 5 X10E3/UL (ref 1.4–7)
NEUTROPHILS NFR BLD AUTO: 71 %
PLATELET # BLD AUTO: 265 X10E3/UL (ref 150–450)
POTASSIUM SERPL-SCNC: 3.7 MMOL/L (ref 3.5–5.2)
PROT SERPL-MCNC: 6.9 G/DL (ref 6–8.5)
RBC # BLD AUTO: 4.58 X10E6/UL (ref 3.77–5.28)
SODIUM SERPL-SCNC: 143 MMOL/L (ref 134–144)
TRIGL SERPL-MCNC: 67 MG/DL (ref 0–149)
VLDLC SERPL CALC-MCNC: 13 MG/DL (ref 5–40)
WBC # BLD AUTO: 7.1 X10E3/UL (ref 3.4–10.8)

## 2019-12-02 NOTE — PROGRESS NOTES
RECOMMENDATIONS:  LDL is down to 156!! We are down 20 points from previous reading. Let continue on medication. Let's see house using the fatty part of your thigh, arm or abdomen works for absorption. Continue Crestor. Let's plan to recheck in 2-3 months. Liver and kidney function normal.  Blood counts normal (not anemic).   Vitamin D normal.

## 2019-12-03 ENCOUNTER — HOSPITAL ENCOUNTER (OUTPATIENT)
Dept: MAMMOGRAPHY | Age: 54
Discharge: HOME | End: 2019-12-03
Payer: COMMERCIAL

## 2019-12-03 ENCOUNTER — TELEPHONE (OUTPATIENT)
Dept: FAMILY MEDICINE CLINIC | Age: 54
End: 2019-12-03

## 2019-12-03 DIAGNOSIS — Z12.31 VISIT FOR SCREENING MAMMOGRAM: ICD-10-CM

## 2019-12-03 PROCEDURE — 77067 SCR MAMMO BI INCL CAD: CPT

## 2019-12-03 NOTE — TELEPHONE ENCOUNTER
Labs reviewed from 11/26/19 and per Natalie Cantor:    LDL is down to 156!! We are down 20 points from previous reading. Let continue on medication. Let's see house using the fatty part of your thigh, arm or abdomen works for absorption. Continue Crestor. Let's plan to recheck in 2-3 months.     Liver and kidney function normal.  Blood counts normal (not anemic). Vitamin D normal.  Verified patient with two types of identifiers. Notified patient of results.

## 2019-12-13 ENCOUNTER — TELEPHONE (OUTPATIENT)
Dept: FAMILY MEDICINE CLINIC | Age: 54
End: 2019-12-13

## 2019-12-13 NOTE — TELEPHONE ENCOUNTER
----- Message from Albina Miller sent at 12/13/2019 10:34 AM EST -----  Regarding: NP Eliana/telephone  Caller's first and last name: self  Reason for call: yes  Callback required yes/no and why: yes  Best contact number(s): [phone]  Details to clarify the request: Pt stated a prior authorization is needed  for the Repatha shot.

## 2019-12-17 ENCOUNTER — CLINICAL SUPPORT (OUTPATIENT)
Dept: FAMILY MEDICINE CLINIC | Age: 54
End: 2019-12-17

## 2019-12-17 VITALS — DIASTOLIC BLOOD PRESSURE: 65 MMHG | SYSTOLIC BLOOD PRESSURE: 142 MMHG

## 2019-12-17 DIAGNOSIS — I10 ESSENTIAL HYPERTENSION, BENIGN: Primary | ICD-10-CM

## 2019-12-17 RX ORDER — AMLODIPINE BESYLATE 2.5 MG/1
2.5 TABLET ORAL DAILY
Qty: 90 TAB | Refills: 1 | Status: SHIPPED | OUTPATIENT
Start: 2019-12-17 | End: 2020-01-28 | Stop reason: SDUPTHER

## 2019-12-17 NOTE — PROGRESS NOTES
Patient comes in today for BP check nurse visit    Vitals:    12/17/19 0749 12/17/19 0750   BP: 141/62 142/65     Will add low dose amlodipine. Will follow up with patient on 1/28/19    Orders Placed This Encounter    amLODIPine (NORVASC) 2.5 mg tablet     Sig: Take 1 Tab by mouth daily.      Dispense:  90 Tab     Refill:  1

## 2020-01-28 ENCOUNTER — OFFICE VISIT (OUTPATIENT)
Dept: FAMILY MEDICINE CLINIC | Age: 55
End: 2020-01-28

## 2020-01-28 VITALS
RESPIRATION RATE: 18 BRPM | WEIGHT: 119.4 LBS | HEIGHT: 59 IN | TEMPERATURE: 97.6 F | HEART RATE: 78 BPM | SYSTOLIC BLOOD PRESSURE: 145 MMHG | OXYGEN SATURATION: 100 % | BODY MASS INDEX: 24.07 KG/M2 | DIASTOLIC BLOOD PRESSURE: 56 MMHG

## 2020-01-28 DIAGNOSIS — I10 ESSENTIAL HYPERTENSION, BENIGN: ICD-10-CM

## 2020-01-28 DIAGNOSIS — K21.9 GASTROESOPHAGEAL REFLUX DISEASE, ESOPHAGITIS PRESENCE NOT SPECIFIED: ICD-10-CM

## 2020-01-28 DIAGNOSIS — B35.1 ONYCHOMYCOSIS: ICD-10-CM

## 2020-01-28 DIAGNOSIS — M06.9 RHEUMATOID ARTHRITIS INVOLVING MULTIPLE SITES, UNSPECIFIED RHEUMATOID FACTOR PRESENCE: ICD-10-CM

## 2020-01-28 DIAGNOSIS — G60.0 CMT (CHARCOT-MARIE-TOOTH DISEASE): ICD-10-CM

## 2020-01-28 DIAGNOSIS — Z00.00 ROUTINE GENERAL MEDICAL EXAMINATION AT A HEALTH CARE FACILITY: Primary | ICD-10-CM

## 2020-01-28 DIAGNOSIS — E78.5 DYSLIPIDEMIA, GOAL LDL BELOW 100: Chronic | ICD-10-CM

## 2020-01-28 DIAGNOSIS — R09.89 ABDOMINAL BRUIT: ICD-10-CM

## 2020-01-28 RX ORDER — AMLODIPINE BESYLATE 5 MG/1
5 TABLET ORAL DAILY
Qty: 90 TAB | Refills: 3 | Status: SHIPPED | OUTPATIENT
Start: 2020-01-28 | End: 2021-02-11

## 2020-01-28 NOTE — PATIENT INSTRUCTIONS
Gastroesophageal Reflux Disease (GERD): Care Instructions Your Care Instructions Gastroesophageal reflux disease (GERD) is the backward flow of stomach acid into the esophagus. The esophagus is the tube that leads from your throat to your stomach. A one-way valve prevents the stomach acid from moving up into this tube. When you have GERD, this valve does not close tightly enough. If you have mild GERD symptoms including heartburn, you may be able to control the problem with antacids or over-the-counter medicine. Changing your diet, losing weight, and making other lifestyle changes can also help reduce symptoms. Follow-up care is a key part of your treatment and safety. Be sure to make and go to all appointments, and call your doctor if you are having problems. It's also a good idea to know your test results and keep a list of the medicines you take. How can you care for yourself at home? · Take your medicines exactly as prescribed. Call your doctor if you think you are having a problem with your medicine. · Your doctor may recommend over-the-counter medicine. For mild or occasional indigestion, antacids, such as Tums, Gaviscon, Mylanta, or Maalox, may help. Your doctor also may recommend over-the-counter acid reducers, such as Pepcid AC, Tagamet HB, Zantac 75, or Prilosec. Read and follow all instructions on the label. If you use these medicines often, talk with your doctor. · Change your eating habits. ? It's best to eat several small meals instead of two or three large meals. ? After you eat, wait 2 to 3 hours before you lie down. ? Chocolate, mint, and alcohol can make GERD worse. ? Spicy foods, foods that have a lot of acid (like tomatoes and oranges), and coffee can make GERD symptoms worse in some people. If your symptoms are worse after you eat a certain food, you may want to stop eating that food to see if your symptoms get better. · Do not smoke or chew tobacco. Smoking can make GERD worse. If you need help quitting, talk to your doctor about stop-smoking programs and medicines. These can increase your chances of quitting for good. · If you have GERD symptoms at night, raise the head of your bed 6 to 8 inches by putting the frame on blocks or placing a foam wedge under the head of your mattress. (Adding extra pillows does not work.) · Do not wear tight clothing around your middle. · Lose weight if you need to. Losing just 5 to 10 pounds can help. When should you call for help? Call your doctor now or seek immediate medical care if: 
  · You have new or different belly pain.  
  · Your stools are black and tarlike or have streaks of blood.  
 Watch closely for changes in your health, and be sure to contact your doctor if: 
  · Your symptoms have not improved after 2 days.  
  · Food seems to catch in your throat or chest.  
Where can you learn more? Go to http://sherry-moon.info/. Enter Y238 in the search box to learn more about \"Gastroesophageal Reflux Disease (GERD): Care Instructions. \" Current as of: November 7, 2018 Content Version: 12.2 © 5853-5359 [employer]. Care instructions adapted under license by [facility] (which disclaims liability or warranty for this information). If you have questions about a medical condition or this instruction, always ask your healthcare professional. [place] [address] any warranty or liability for your use of this information.

## 2020-01-28 NOTE — PROGRESS NOTES
Chief Complaint   Patient presents with    Complete Physical     Pt still concerned with GERD. Pt wants to verify methotrexate is not causing it. Pt states was told about a \"swooshing\" sound in lower abdomen. Pt would like foot exam.     1. Have you been to the ER, urgent care clinic since your last visit? Hospitalized since your last visit? No    2. Have you seen or consulted any other health care providers outside of the [address] [place] [address] since your last visit? Include any pap smears or colon screening.  No     PAP - Unaware, Went to [place] [id number] Maintenance Due   Topic Date Due    PAP AKA CERVICAL CYTOLOGY  02/23/2018

## 2020-01-28 NOTE — PROGRESS NOTES
HISTORY OF PRESENT ILLNESS  Jackie Mccollum is a 49 y.o. female. HPI  Patient comes in today for CPE  Pt still concerned with GERD. Pt wants to verify methotrexate is not causing it. States she is taking prilosec at night. She takes methotrexate without food at times and sometimes with food. Has not paid attention to see if GERD better with food. Patient states she is taking repatha, injecting in fatty part of arm. Thinks he daughter may have been injecting into muscle because it was causing pain and swelling. States shots are painless now. Here to have FLP done to see if better absorption SQ  Dr. Ky Jain said he said he hear a \"swooshing sound\" in abdomen. She had CTA abd in 2016 - negative for AAA. US in 2014 negative for AAA. Using Kenrick's vapor rub on toenails for fungal disease.   States improving    Allergies   Allergen Reactions    Flomax [Tamsulosin] Other (comments)     Severe headache    Percocet [Oxycodone-Acetaminophen] Nausea and Vomiting and Other (comments)     dizziness    Lipitor [Atorvastatin] Other (comments)     Dizzy and nausea    Zetia [Ezetimibe] Other (comments)     headaches    Gabapentin Nausea and Vomiting     dizziness    Naproxen Nausea and Vomiting    Prednisone Nausea and Vomiting       Past Medical History:   Diagnosis Date    Aortic valvar stenosis 2/23/2015    Carotid stenosis, bilateral     CMT (Charcot Louise Tooth) disease     High risk for fracture due to osteoporosis by DEXA scan 2/23/2015    Hypercholesteremia 3/15/2010    Osteopenia     RA (rheumatoid arthritis) ([address] 3/15/2010       Past Surgical History:   Procedure Laterality Date    HX GYN      BTL    RENAL SCOPE,REMV FB/STONE         Social History     Socioeconomic History    Marital status:      Spouse name: Not on file    Number of children: Not on file    Years of education: Not on file    Highest education level: Not on file   Occupational History    Not on file   Social Needs    Financial resource strain: Not on file    Food insecurity:     Worry: Not on file     Inability: Not on file    Transportation needs:     Medical: Not on file     Non-medical: Not on file   Tobacco Use    Smoking status: Never Smoker    Smokeless tobacco: Never Used   Substance and Sexual Activity    Alcohol use: No     Alcohol/week: 0.0 standard drinks    Drug use: No    Sexual activity: Not on file   Lifestyle    Physical activity:     Days per week: Not on file     Minutes per session: Not on file    Stress: Not on file   Relationships    Social connections:     Talks on phone: Not on file     Gets together: Not on file     Attends Christian service: Not on file     Active member of club or organization: Not on file     Attends meetings of clubs or organizations: Not on file     Relationship status: Not on file    Intimate partner violence:     Fear of current or ex partner: Not on file     Emotionally abused: Not on file     Physically abused: Not on file     Forced sexual activity: Not on file   Other Topics Concern    Not on file   Social History Narrative    Not on file       Family History   Problem Relation Age of Onset    Thyroid Disease Mother     Elevated Lipids Mother     Heart Disease Mother     Stroke Mother 48    Cancer Maternal Aunt         breast    Breast Cancer Maternal Aunt         over 48    Cancer Maternal Grandmother         throat/was snuff user       Current Outpatient Medications   Medication Sig    amLODIPine (NORVASC) 2.5 mg tablet Take 1 Tab by mouth daily.  methotrexate (RHEUMATREX) 2.5 mg tablet Take 1 Tab by mouth every Sunday. Take 6 Tablets weekly.  omeprazole (PRILOSEC) 40 mg capsule Take 1 Cap by mouth daily.  rosuvastatin (CRESTOR) 40 mg tablet TAKE 1 TABLET BY MOUTH AT BEDTIME    evolocumab (REPATHA SYRINGE) syringe 1 mL by SubCUTAneous route every fourteen (14) days.     methocarbamol (ROBAXIN) 500 mg tablet TAKE 1 TABLET BY MOUTH 3 TIMES A DAY AS NEEDED FOR NECK PAIN    calcium citrate-vitamin D3 (CITRACAL WITH VITAMIN D MAXIMUM) tablet Take 1 Tab by mouth two (2) times a day. No current facility-administered medications for this visit. Review of Systems   Constitutional: Negative for chills, fever, malaise/fatigue and weight loss. Respiratory: Negative. Negative for shortness of breath. Cardiovascular: Negative. Negative for chest pain and palpitations. \"swooshing sound\" iin abdomen   Gastrointestinal: Positive for heartburn. Negative for abdominal pain, blood in stool, melena, nausea and vomiting. Genitourinary: Negative. Negative for dysuria, frequency and urgency. Musculoskeletal: Positive for back pain (off and on, depends on amount of lifting), joint pain (hx RA) and neck pain. Skin: Negative for itching and rash. Toenail discoloration   Neurological: Negative. Negative for dizziness, tingling, sensory change, focal weakness and headaches. Psychiatric/Behavioral: Negative. Vitals:    01/28/20 0910   BP: 145/56   Pulse: 78   Resp: 18   Temp: 97.6 °F (36.4 °C)   TempSrc: Oral   SpO2: 100%   Weight: 119 lb 6.4 oz (54.2 kg)   Height: 4' 11\" (1.499 m)     Physical Exam  Vitals signs reviewed. Constitutional:       Appearance: Normal appearance. She is well-developed. HENT:      Right Ear: Hearing, tympanic membrane, ear canal and external ear normal.      Left Ear: Hearing, tympanic membrane, ear canal and external ear normal.      Nose: Nose normal.      Right Sinus: No maxillary sinus tenderness or frontal sinus tenderness. Left Sinus: No maxillary sinus tenderness or frontal sinus tenderness. Mouth/Throat:      Mouth: Mucous membranes are not pale and not dry. Pharynx: Uvula midline. No oropharyngeal exudate or posterior oropharyngeal erythema. Neck:      Thyroid: No thyroid mass or thyromegaly. Cardiovascular:      Rate and Rhythm: Normal rate and regular rhythm.       Pulses: Radial pulses are 2+ on the right side and 2+ on the left side. Dorsalis pedis pulses are 2+ on the right side and 2+ on the left side. Posterior tibial pulses are 2+ on the right side and 2+ on the left side. Heart sounds: S1 normal and S2 normal. Murmur present. Systolic murmur present. Pulmonary:      Effort: Pulmonary effort is normal.      Breath sounds: Normal breath sounds. No decreased breath sounds, wheezing, rhonchi or rales. Abdominal:      General: Bowel sounds are normal.      Palpations: Abdomen is soft. Tenderness: There is no abdominal tenderness. Comments: +abd bruit noted   Genitourinary:     Comments: GYN - deferred - done by GYN  Musculoskeletal:      Right hand: She exhibits decreased range of motion, tenderness, bony tenderness and deformity (rheumatoid nodules noted PIP and MCP joints). She exhibits normal capillary refill and no swelling. Normal sensation noted. Decreased strength noted. Left hand: She exhibits decreased range of motion, tenderness, bony tenderness and deformity (rheumatoid nodules noted PIP and MCP joints). She exhibits normal capillary refill and no swelling. Normal sensation noted. Decreased strength noted. Lymphadenopathy:      Head:      Right side of head: No submental, submandibular, tonsillar, preauricular or posterior auricular adenopathy. Left side of head: No submental, submandibular, tonsillar, preauricular or posterior auricular adenopathy. Cervical: No cervical adenopathy. Upper Body:      Right upper body: No supraclavicular adenopathy. Left upper body: No supraclavicular adenopathy. Skin:     General: Skin is warm and dry. Neurological:      Mental Status: She is alert and oriented to person, place, and time. Psychiatric:         Speech: Speech normal.         Behavior: Behavior normal. Behavior is cooperative. Thought Content:  Thought content normal.         Judgment: Judgment normal.         ASSESSMENT and PLAN    ICD-10-CM ICD-9-CM    1. Routine general medical examination at a health care facility Z00.00 V70.0    2. Essential hypertension, benign I10 401.1 amLODIPine (NORVASC) 5 mg tablet   3. Dyslipidemia, goal LDL below 100 E78.5 272.4 LIPID PANEL      US EXAM SCREENING AAA   4. Abdominal bruit R09.89 785.9 US EXAM SCREENING AAA   5. Gastroesophageal reflux disease, esophagitis presence not specified K21.9 530.81    6. Onychomycosis B35.1 110.1    7. Rheumatoid arthritis involving multiple sites, unspecified rheumatoid factor presence (HCC) M06.9 714.0    8. CMT (Charcot-Louise-Tooth disease) G60.0 356.1      Encounter Diagnoses   Name Primary?  Routine general medical examination at a health care facility Yes    Essential hypertension, benign     Dyslipidemia, goal LDL below 100     Abdominal bruit     Gastroesophageal reflux disease, esophagitis presence not specified     Onychomycosis     Rheumatoid arthritis involving multiple sites, unspecified rheumatoid factor presence (HCC)     CMT (Charcot-Louise-Tooth disease)      Orders Placed This Encounter    US EXAM SCREENING AAA    LIPID PANEL    amLODIPine (NORVASC) 5 mg tablet     Diagnoses and all orders for this visit:    1. Routine general medical examination at a health care facility    2. Essential hypertension, benign - not at goal, increase amlodipine to 5mg daily. -     amLODIPine (NORVASC) 5 mg tablet; Take 1 Tab by mouth daily. 3. Dyslipidemia, goal LDL below 100 - LDL improving on repatha, recheck FLP since using more in fatty part of arm. Thinks daughter was giving IM before. -     LIPID PANEL  -     US EXAM SCREENING AAA; Future    4. Abdominal bruit  -     US EXAM SCREENING AAA; Future    5. Gastroesophageal reflux disease, esophagitis presence not specified - encouraged to take Prilosec in morning on empty stomach    6. Onychomycosis - using Kenrick's vapor rub with some success    7.  Rheumatoid arthritis involving multiple sites, unspecified rheumatoid factor presence ([facility] [address] - per rheumatology    8. CMT (Charcot-Louise-Tooth disease)      Follow-up and Dispositions    · Return in about 4 months (around 5/28/2020), or if symptoms worsen or fail to improve.       lab results and schedule of future lab studies reviewed with patient  reviewed diet, exercise and weight control  cardiovascular risk and specific lipid/LDL goals reviewed    I have reviewed the patient's allergies and made any necessary changes. Medical, procedural, social and family histories have been reviewed and updated as medically indicated. I have reconciled and/or revised patient medications in the EMR. I have discussed each diagnosis listed in this note with Amaya Scriver and/or their family. I have discussed treatment options and the risk/benefit analysis of those options, including safe use of medications and possible medication side effects. Through the use of shared decision making we have agreed to the above plan. The patient has received an after-visit summary and questions were answered concerning future plans. Sylvie Monroy, LIVEP-C    This note will not be viewable in [employer].

## 2020-01-29 LAB
CHOLEST SERPL-MCNC: 143 MG/DL (ref 100–199)
HDLC SERPL-MCNC: 56 MG/DL
INTERPRETATION, 910389: NORMAL
LDLC SERPL CALC-MCNC: 71 MG/DL (ref 0–99)
TRIGL SERPL-MCNC: 79 MG/DL (ref 0–149)
VLDLC SERPL CALC-MCNC: 16 MG/DL (ref 5–40)

## 2020-02-10 NOTE — TELEPHONE ENCOUNTER
[employer] at [phone]. Per verbal order from Lenetta Pack, NP placed refill order for Repatha. 2 Injections with 11 refills. Informed pt above. Pt verbalized understanding.

## 2020-02-10 NOTE — TELEPHONE ENCOUNTER
----- Message from Celena Vanegas sent at 2/10/2020 10:57 AM EST -----  Regarding: NP Eliana/refill  Contact: [phone]  Caller (if not patient): Silver Carr  Relationship of caller (if not patient):n/a   Best contact number(s): [phone]  Name of medication and dosage if known: \"Repatha\" 140 mg    Is patient out of this medication (yes/no): N    Pharmacy name: \"[facility]\" - Pt stated that it comes in the mail. Pharmacy listed in chart? (yes/no): Y  Pharmacy phone number: In chart. Date of last visit:  Tuesday, May 26, 2020  Details to clarify the request: Pt is out of refills.

## 2020-02-12 ENCOUNTER — HOSPITAL ENCOUNTER (OUTPATIENT)
Dept: INFUSION THERAPY | Age: 55
Discharge: HOME | End: 2020-02-12
Payer: COMMERCIAL

## 2020-02-12 VITALS
TEMPERATURE: 98.1 F | DIASTOLIC BLOOD PRESSURE: 79 MMHG | HEART RATE: 107 BPM | SYSTOLIC BLOOD PRESSURE: 131 MMHG | OXYGEN SATURATION: 98 % | WEIGHT: 117.1 LBS | BODY MASS INDEX: 23.65 KG/M2 | RESPIRATION RATE: 16 BRPM

## 2020-02-12 LAB
ALBUMIN SERPL-MCNC: 3.9 G/DL (ref 3.5–5)
ANION GAP SERPL CALC-SCNC: 7 MMOL/L (ref 5–15)
BUN SERPL-MCNC: 9 MG/DL (ref 6–20)
BUN/CREAT SERPL: 14 (ref 12–20)
CALCIUM SERPL-MCNC: 8.9 MG/DL (ref 8.5–10.1)
CHLORIDE SERPL-SCNC: 108 MMOL/L (ref 97–108)
CO2 SERPL-SCNC: 27 MMOL/L (ref 21–32)
CREAT SERPL-MCNC: 0.64 MG/DL (ref 0.55–1.02)
GLUCOSE SERPL-MCNC: 86 MG/DL (ref 65–100)
MAGNESIUM SERPL-MCNC: 2.4 MG/DL (ref 1.6–2.4)
PHOSPHATE SERPL-MCNC: 3.6 MG/DL (ref 2.6–4.7)
POTASSIUM SERPL-SCNC: 3.7 MMOL/L (ref 3.5–5.1)
SODIUM SERPL-SCNC: 142 MMOL/L (ref 136–145)

## 2020-02-12 PROCEDURE — 83735 ASSAY OF MAGNESIUM: CPT

## 2020-02-12 PROCEDURE — 80069 RENAL FUNCTION PANEL: CPT

## 2020-02-12 PROCEDURE — 36415 COLL VENOUS BLD VENIPUNCTURE: CPT

## 2020-02-12 NOTE — PROGRESS NOTES
[facility] VISIT NOTE    6416  Pt arrived at [address] ambulatory and in no distress for Prolia. Labs drawn peripherally by Josselyn Ruffin. Lab results are within treatment parameters. Recent Results (from the past 12 hour(s))   RENAL FUNCTION PANEL    Collection Time: 02/12/20 10:54 AM   Result Value Ref Range    Sodium 142 136 - 145 mmol/L    Potassium 3.7 3.5 - 5.1 mmol/L    Chloride 108 97 - 108 mmol/L    CO2 27 21 - 32 mmol/L    Anion gap 7 5 - 15 mmol/L    Glucose 86 65 - 100 mg/dL    BUN 9 6 - 20 MG/DL    Creatinine 0.64 0.55 - 1.02 MG/DL    BUN/Creatinine ratio 14 12 - 20      GFR est AA >60 >60 ml/min/1.73m2    GFR est non-AA >60 >60 ml/min/1.73m2    Calcium 8.9 8.5 - 10.1 MG/DL    Phosphorus 3.6 2.6 - 4.7 MG/DL    Albumin 3.9 3.5 - 5.0 g/dL   MAGNESIUM    Collection Time: 02/12/20 10:54 AM   Result Value Ref Range    Magnesium 2.4 1.6 - 2.4 mg/dL     Patient would rather reschedule for the injection instead of waiting for lab results. Next appointment is 2/18/20 at 21 [phone].

## 2020-02-17 ENCOUNTER — TELEPHONE (OUTPATIENT)
Dept: FAMILY MEDICINE CLINIC | Age: 55
End: 2020-02-17

## 2020-02-17 NOTE — TELEPHONE ENCOUNTER
----- Message from Suman Sethi sent at 2/17/2020  3:56 PM EST -----  Regarding: NP Eliana/Refill  Medication Refill    Caller (if not patient):      Relationship of caller (if not patient):      Best contact number(s):  [phone]    Name of medication and dosage if known:  \"Rapathy\"     Is patient out of this medication (yes/no):  Yes    Pharmacy name: [facility] listed in chart? (yes/no): Yes  Pharmacy phone number:       Details to clarify the request:      Suman Sethi

## 2020-02-18 ENCOUNTER — HOSPITAL ENCOUNTER (OUTPATIENT)
Dept: INFUSION THERAPY | Age: 55
Discharge: HOME | End: 2020-02-18
Payer: COMMERCIAL

## 2020-02-18 NOTE — TELEPHONE ENCOUNTER
[employer] [facility] at [phone]. Was informed the previous order had been placed for auto-injectors (that needs PA) not pt previously approved syringes. Had order switched to injections that are approved. Verified patient with two type of identifiers. Informed pt of above. Pt verbalized understanding.

## 2020-02-25 ENCOUNTER — HOSPITAL ENCOUNTER (OUTPATIENT)
Dept: ULTRASOUND IMAGING | Age: 55
Discharge: HOME | End: 2020-02-25
Payer: COMMERCIAL

## 2020-02-25 DIAGNOSIS — R09.89 ABDOMINAL BRUIT: ICD-10-CM

## 2020-02-25 DIAGNOSIS — E78.5 DYSLIPIDEMIA, GOAL LDL BELOW 100: Chronic | ICD-10-CM

## 2020-02-25 PROCEDURE — 76706 US ABDL AORTA SCREEN AAA: CPT

## 2020-03-02 NOTE — TELEPHONE ENCOUNTER
Received call from [facility]fusion center informing that having Prolia injections at their location is out of network. Suggestion from insurance is for pt to bring injections to office.

## 2020-03-03 ENCOUNTER — TELEPHONE (OUTPATIENT)
Dept: FAMILY MEDICINE CLINIC | Age: 55
End: 2020-03-03

## 2020-03-03 ENCOUNTER — HOSPITAL ENCOUNTER (OUTPATIENT)
Dept: INFUSION THERAPY | Age: 55
Discharge: HOME | End: 2020-03-03

## 2020-03-03 NOTE — TELEPHONE ENCOUNTER
----- Message from Jazmin Burt sent at 3/3/2020  6:44 AM EST -----  Regarding: Eliana/telephone  Pt stated the medication Prolia is to costly and she is requesting for you to call her. Pts number is [phone].

## 2020-03-03 NOTE — TELEPHONE ENCOUNTER
Verified patient with two type of identifiers. Informed pt the next step for acquiring medication for pt is sending a request directly to [facility]. Informed pt we are faxing today. Pt verbalized understanding.

## 2020-05-21 ENCOUNTER — VIRTUAL VISIT (OUTPATIENT)
Dept: FAMILY MEDICINE CLINIC | Age: 55
End: 2020-05-21

## 2020-05-21 DIAGNOSIS — M81.0 OSTEOPOROSIS, UNSPECIFIED OSTEOPOROSIS TYPE, UNSPECIFIED PATHOLOGICAL FRACTURE PRESENCE: ICD-10-CM

## 2020-05-21 DIAGNOSIS — G60.0 CMT (CHARCOT-MARIE-TOOTH DISEASE): ICD-10-CM

## 2020-05-21 DIAGNOSIS — M06.9 RHEUMATOID ARTHRITIS INVOLVING MULTIPLE SITES, UNSPECIFIED RHEUMATOID FACTOR PRESENCE: ICD-10-CM

## 2020-05-21 DIAGNOSIS — I10 ESSENTIAL HYPERTENSION, BENIGN: ICD-10-CM

## 2020-05-21 DIAGNOSIS — M67.88 ACHILLES TENDINOSIS OF BOTH LOWER EXTREMITIES: Primary | ICD-10-CM

## 2020-05-21 DIAGNOSIS — E78.5 DYSLIPIDEMIA, GOAL LDL BELOW 100: ICD-10-CM

## 2020-05-21 RX ORDER — PREDNISONE 10 MG/1
10 TABLET ORAL 2 TIMES DAILY
Qty: 14 TAB | Refills: 0 | Status: SHIPPED | OUTPATIENT
Start: 2020-05-21 | End: 2020-05-28

## 2020-05-21 NOTE — Clinical Note
BP cuff from [facility] delivered Can you reach out to Prolia rep to see if they can help with coverage  thanks

## 2020-05-21 NOTE — PATIENT INSTRUCTIONS
Achilles Tendon: Exercises Introduction Here are some examples of exercises for you to try. The exercises may be suggested for a condition or for rehabilitation. Start each exercise slowly. Ease off the exercises if you start to have pain. You will be told when to start these exercises and which ones will work best for you. Toe stretch Toe stretch 1. Sit in a chair, and extend your affected leg so that your heel is on the floor. 2. With your hand, reach down and pull your big toe up and back. Pull toward your ankle and away from the floor. 3. Hold the position for at least 15 to 30 seconds. 4. Repeat 2 to 4 times a session, several times a day. Calf-plantar fascia stretch 1. Sit with your legs extended and knees straight. 2. Place a towel around your foot just under the toes. 3. Hold each end of the towel in each hand, with your hands above your knees. 4. Pull back with the towel so that your foot stretches toward you. 5. Hold the position for at least 15 to 30 seconds. 6. Repeat 2 to 4 times a session, up to 5 sessions a day. Floor stretch 1. Stand about 2 feet from a wall, and place your hands on the wall at about shoulder height. Or you can stand behind a chair, placing your hands on the back of it for balance. 2. Step back with the leg you want to stretch. Keep the leg straight, and press your heel into the floor with your toe turned slightly in. 
3. Lean forward, and bend your other leg slightly. Feel the stretch in the Achilles tendon of your back leg. Hold for at least 15 to 30 seconds. 4. Repeat 2 to 4 times a session, up to 5 sessions a day. Stair stretch 1. Stand with the balls of both feet on the edge of a step or curb (or a medium-sized phone book). With at least one hand, hold onto something solid for balance, such as a banister or handrail.  
2. Keeping your affected leg straight, slowly let that heel hang down off of the step or curb until you feel a stretch in the back of your calf and/or Achilles area. Some of your weight should still be on the other leg. 3. Hold this position for at least 15 to 30 seconds. 4. Repeat 2 to 4 times a session, up to 5 times a day or whenever your Achilles tendon starts to feel tight. This stretch can also be done with your knee slightly bent. Strength exercise 1. This exercise will get you started on building strength after an Achilles tendon injury. Your doctor or physical therapist can help you move on to more challenging exercises as you heal and get stronger. 2. Stand on a step with your heel off the edge of the step. Hold on to a handrail or wall for balance. 3. Push up on your toes, then slowly count to 10 as you lower yourself back down until your heel is below the step. If it hurts to push up on your toes, try putting most of your weight on your other foot as you push up, or try using your arms to help you. If you can't do this exercise without causing pain, stop the exercise and talk to your doctor. 4. Repeat the exercise 8 to 12 times, half with the knee straight and half with the knee bent. Follow-up care is a key part of your treatment and safety. Be sure to make and go to all appointments, and call your doctor if you are having problems. It's also a good idea to know your test results and keep a list of the medicines you take. Where can you learn more? Go to http://sherry-moon.info/ Enter L417 in the search box to learn more about \"Achilles Tendon: Exercises. \" Current as of: June 26, 2019Content Version: 12.4 © 6114-2261 [employer]. Care instructions adapted under license by [employer] (which disclaims liability or warranty for this information).  If you have questions about a medical condition or this instruction, always ask your healthcare professional. Yary Castro disclaims any warranty or liability for your use of this information.

## 2020-05-21 NOTE — PROGRESS NOTES
Nilson Loera is a 49 y.o. female evaluated via audio only technology on 5/21/2020. Consent: She and/or her health care decision maker is aware that she may receive a bill for this audio only encounter, depending on her insurance coverage, and has provided verbal consent to proceed: Yes    I communicated with the patient and/or health care decision maker about the nature and details of the following:  Assessment & Plan:   Diagnoses and all orders for this visit:    1. Achilles tendinosis of both lower extremities - given steroid burst, HEP. Patient to schedule appt with podiatrist  -     predniSONE (DELTASONE) 10 mg tablet; Take 10 mg by mouth two (2) times a day for 7 days. 2. Essential hypertension, benign - last BP reading 120-130s SBP. Continue amlodipine    3. Dyslipidemia, goal LDL below 100 - on repatha. Will follow up in Sept with FLP    4. Rheumatoid arthritis involving multiple sites, unspecified rheumatoid factor presence ([facility] [address] - to see rheumatologist in June 2020    5. CMT (Charcot-Louise-Tooth disease)    6. Osteoporosis, unspecified osteoporosis type, unspecified pathological fracture presence - insurance would not cover Prolia. Will have nurse reach out to rep to see if they can help with coverage      Follow-up and Dispositions    · Return in about 4 months (around 9/21/2020), or if symptoms worsen or fail to improve. 12  Subjective:   Nilson Loera is a 49 y.o. female who was seen for Hypertension and Cholesterol Problem    Posterior heels over achilles area have been swollen for months. Does not recall injury, does not recall hearing a \"popping\" or tearing sound. States walking actually helps to relieve some of pain. Hx of RA, does have appt with rheumatologist next month. Hx of CMT. Has podiatrist, has not seen recently. Taking amlodipine. Tolerating medication. Denies chest pains, palpitations, dyspnea.   Does not have way to monitor BP at home    Doing [address] [place] [address] cholesterol. Last LDL 71 in January 2020!! Insurance won't cover Prolia  Prior to Admission medications    Medication Sig Start Date End Date Taking? Authorizing Provider   amLODIPine (NORVASC) 5 mg tablet Take 1 Tab by mouth daily. 1/28/20  Yes Radha Monroy NP   omeprazole (PRILOSEC) 40 mg capsule Take 1 Cap by mouth daily. 11/21/19  Yes Radha Monroy NP   rosuvastatin (CRESTOR) 40 mg tablet TAKE 1 TABLET BY MOUTH AT BEDTIME 7/19/19  Yes Radha Monroy NP   evolocumab (REPATHA SYRINGE) syringe 1 mL by SubCUTAneous route every fourteen (14) days. 7/19/19  Yes Radha Monroy NP   methocarbamol (ROBAXIN) 500 mg tablet TAKE 1 TABLET BY MOUTH 3 TIMES A DAY AS NEEDED FOR NECK PAIN 6/19/18  Yes Radha Monroy NP   calcium citrate-vitamin D3 (CITRACAL WITH VITAMIN D MAXIMUM) tablet Take 1 Tab by mouth two (2) times a day. 10/21/16  Yes Radha Monroy NP   methotrexate (RHEUMATREX) 2.5 mg tablet Take 1 Tab by mouth every Sunday. Take 6 Tablets weekly.   Patient not taking: Reported on 5/21/2020 12/1/19   Martin Olson NP     Allergies   Allergen Reactions    Flomax [Tamsulosin] Other (comments)     Severe headache    Percocet [Oxycodone-Acetaminophen] Nausea and Vomiting and Other (comments)     dizziness    Lipitor [Atorvastatin] Other (comments)     Dizzy and nausea    Zetia [Ezetimibe] Other (comments)     headaches    Gabapentin Nausea and Vomiting     dizziness    Naproxen Nausea and Vomiting    Prednisone Nausea and Vomiting       Patient Active Problem List   Diagnosis Code    RA (rheumatoid arthritis) ([facility] [address] M06.9    Dyslipidemia, goal LDL below 100 E78.5    CMT (Charcot-Louise-Tooth disease) G60.0    Carotid stenosis, bilateral I65.23    Encounter for long-term (current) use of other medications Z79.899    Essential hypertension, benign I10    Calculus of kidney N20.0    Osteoporosis M81.0    High risk for fracture due to osteoporosis by DEXA scan M81.0    Cardiovascular risk factor Z91.89    Cardiac risk counseling Z71.89    Aortic valvar stenosis I35.0     Current Outpatient Medications   Medication Sig Dispense Refill    amLODIPine (NORVASC) 5 mg tablet Take 1 Tab by mouth daily. 90 Tab 3    omeprazole (PRILOSEC) 40 mg capsule Take 1 Cap by mouth daily. 90 Cap 0    rosuvastatin (CRESTOR) 40 mg tablet TAKE 1 TABLET BY MOUTH AT BEDTIME 30 Tab 11    evolocumab (REPATHA SYRINGE) syringe 1 mL by SubCUTAneous route every fourteen (14) days. 2 Syringe 5    methocarbamol (ROBAXIN) 500 mg tablet TAKE 1 TABLET BY MOUTH 3 TIMES A DAY AS NEEDED FOR NECK PAIN 60 Tab 3    calcium citrate-vitamin D3 (CITRACAL WITH VITAMIN D MAXIMUM) tablet Take 1 Tab by mouth two (2) times a day. 60 Tab 5    methotrexate (RHEUMATREX) 2.5 mg tablet Take 1 Tab by mouth every Sunday. Take 6 Tablets weekly.  (Patient not taking: Reported on 5/21/2020) 24 Tab 3     Allergies   Allergen Reactions    Flomax [Tamsulosin] Other (comments)     Severe headache    Percocet [Oxycodone-Acetaminophen] Nausea and Vomiting and Other (comments)     dizziness    Lipitor [Atorvastatin] Other (comments)     Dizzy and nausea    Zetia [Ezetimibe] Other (comments)     headaches    Gabapentin Nausea and Vomiting     dizziness    Naproxen Nausea and Vomiting    Prednisone Nausea and Vomiting     Past Medical History:   Diagnosis Date    Aortic valvar stenosis 2/23/2015    Carotid stenosis, bilateral     CMT (Charcot Louise Tooth) disease     High risk for fracture due to osteoporosis by DEXA scan 2/23/2015    Hypercholesteremia 3/15/2010    Osteopenia     RA (rheumatoid arthritis) ([facility] [address] 3/15/2010     Past Surgical History:   Procedure Laterality Date    HX GYN      BTL    RENAL SCOPE,REMV FB/STONE       Family History   Problem Relation Age of Onset    Thyroid Disease Mother     Elevated Lipids Mother     Heart Disease Mother     Stroke Mother 48    Cancer Maternal Aunt         breast    Breast Cancer Maternal Aunt         over 48    Cancer Maternal Grandmother         throat/was snuff user     Social History     Tobacco Use    Smoking status: Never Smoker    Smokeless tobacco: Never Used   Substance Use Topics    Alcohol use: No     Alcohol/week: 0.0 standard drinks       Review of Systems   Constitutional: Negative for chills and fever. Respiratory: Negative. Negative for shortness of breath. Cardiovascular: Negative. Negative for chest pain and palpitations. Gastrointestinal: Negative for abdominal pain, nausea and vomiting. Genitourinary: Negative. Negative for dysuria, frequency and urgency. Musculoskeletal: Positive for joint pain (hx RA, bilateral heel pain). Skin: Negative for rash. Neurological: Negative. Negative for dizziness, tingling, sensory change, focal weakness and headaches. Psychiatric/Behavioral: Negative. I affirm this is a Patient-Initiated Episode with a Patient who has not had a related appointment within my department in the past 7 days or scheduled within the next 24 hours.     Total Time: minutes: 11-20 minutes    Note: not billable if this call serves to triage the patient into an appointment for the relevant concern      Jayleen Russo NP

## 2020-05-21 NOTE — PROGRESS NOTES
Chief Complaint   Patient presents with    Hypertension    Cholesterol Problem     Pt concerned with back of heels swollen and painful x 2 weeks. 1. Have you been to the ER, urgent care clinic since your last visit? Hospitalized since your last visit? No    2. Have you seen or consulted any other health care providers outside of the [address] [place] [address] since your last visit? Include any pap smears or colon screening. Yes, Dr. Alvarado Level.      Health Maintenance Due   Topic Date Due    PAP AKA CERVICAL CYTOLOGY  02/23/2018

## 2020-05-29 ENCOUNTER — TELEPHONE (OUTPATIENT)
Dept: FAMILY MEDICINE CLINIC | Age: 55
End: 2020-05-29

## 2020-08-12 ENCOUNTER — APPOINTMENT (OUTPATIENT)
Dept: INFUSION THERAPY | Age: 55
End: 2020-08-12

## 2020-08-13 DIAGNOSIS — E78.5 DYSLIPIDEMIA, GOAL LDL BELOW 100: Chronic | ICD-10-CM

## 2020-08-13 RX ORDER — ROSUVASTATIN CALCIUM 40 MG/1
TABLET, COATED ORAL
Qty: 30 TAB | Refills: 11 | Status: SHIPPED | OUTPATIENT
Start: 2020-08-13 | End: 2021-06-16

## 2020-08-28 ENCOUNTER — HOSPITAL ENCOUNTER (OUTPATIENT)
Dept: GENERAL RADIOLOGY | Age: 55
Discharge: HOME | End: 2020-08-28
Payer: COMMERCIAL

## 2020-08-28 DIAGNOSIS — Z79.899 ENCOUNTER FOR LONG-TERM (CURRENT) USE OF OTHER MEDICATIONS: ICD-10-CM

## 2020-08-28 DIAGNOSIS — M06.09 RHEUMATOID ARTHRITIS OF MULTIPLE SITES WITHOUT RHEUMATOID FACTOR (HCC): ICD-10-CM

## 2020-08-28 PROCEDURE — 73130 X-RAY EXAM OF HAND: CPT

## 2020-08-28 PROCEDURE — 71046 X-RAY EXAM CHEST 2 VIEWS: CPT

## 2020-09-03 ENCOUNTER — OFFICE VISIT (OUTPATIENT)
Dept: FAMILY MEDICINE CLINIC | Age: 55
End: 2020-09-03
Payer: COMMERCIAL

## 2020-09-03 VITALS
HEIGHT: 59 IN | BODY MASS INDEX: 25.2 KG/M2 | SYSTOLIC BLOOD PRESSURE: 136 MMHG | WEIGHT: 125 LBS | DIASTOLIC BLOOD PRESSURE: 63 MMHG | HEART RATE: 88 BPM | OXYGEN SATURATION: 100 % | RESPIRATION RATE: 12 BRPM | TEMPERATURE: 97.5 F

## 2020-09-03 DIAGNOSIS — R01.1 SYSTOLIC MURMUR: ICD-10-CM

## 2020-09-03 DIAGNOSIS — M06.9 RHEUMATOID ARTHRITIS INVOLVING MULTIPLE SITES, UNSPECIFIED RHEUMATOID FACTOR PRESENCE: ICD-10-CM

## 2020-09-03 DIAGNOSIS — E78.5 DYSLIPIDEMIA, GOAL LDL BELOW 100: Primary | ICD-10-CM

## 2020-09-03 DIAGNOSIS — M81.0 OSTEOPOROSIS, UNSPECIFIED OSTEOPOROSIS TYPE, UNSPECIFIED PATHOLOGICAL FRACTURE PRESENCE: ICD-10-CM

## 2020-09-03 DIAGNOSIS — G60.0 CMT (CHARCOT-MARIE-TOOTH DISEASE): ICD-10-CM

## 2020-09-03 DIAGNOSIS — R91.1 PULMONARY NODULE: ICD-10-CM

## 2020-09-03 DIAGNOSIS — I10 ESSENTIAL HYPERTENSION, BENIGN: ICD-10-CM

## 2020-09-03 DIAGNOSIS — B35.1 ONYCHOMYCOSIS: ICD-10-CM

## 2020-09-03 PROCEDURE — 99214 OFFICE O/P EST MOD 30 MIN: CPT | Performed by: NURSE PRACTITIONER

## 2020-09-03 RX ORDER — CLOTRIMAZOLE AND BETAMETHASONE DIPROPIONATE 10; .64 MG/G; MG/G
CREAM TOPICAL
COMMUNITY
Start: 2020-07-24 | End: 2020-11-20 | Stop reason: ALTCHOICE

## 2020-09-03 RX ORDER — FOLIC ACID 1 MG/1
1 TABLET ORAL DAILY
COMMUNITY
Start: 2020-06-18 | End: 2022-10-07

## 2020-09-03 RX ORDER — MELATONIN
DAILY
COMMUNITY

## 2020-09-03 RX ORDER — ESOMEPRAZOLE MAGNESIUM 40 MG/1
CAPSULE, DELAYED RELEASE ORAL
COMMUNITY
Start: 2020-08-15 | End: 2020-11-20 | Stop reason: ALTCHOICE

## 2020-09-03 RX ORDER — METHOTREXATE 2.5 MG/1
8 TABLET ORAL
COMMUNITY
Start: 2020-08-30 | End: 2021-09-20

## 2020-09-03 NOTE — PROGRESS NOTES
HISTORY OF PRESENT ILLNESS  Jose Sullivan is a 49 y.o. female. HPI  Patient comes in today for follow up  Taking amlodipine. Tolerating medication. Denies chest pains, palpitations, dyspnea. Does not have way to monitor BP at home   Doing Repatha for cholesterol. Last LDL 71 in January 2020!! Insurance won't cover Prolia - need to have nurse check with [employer] to see if they can help with insurance coverage. She had CTA abd in 2016 - negative for AAA. US in 2014 negative for AAA  Had CXR - pulmonary nodule. Will have CT scan next week. Patient is a nonsmoker. No family hx of lung ca. Denies cough, hemoptysis, fever, night sweats, weight loss. Using Vicks vapor rub for fungal toenail infection. States it is helping.   Allergies   Allergen Reactions    Flomax [Tamsulosin] Other (comments)     Severe headache    Percocet [Oxycodone-Acetaminophen] Nausea and Vomiting and Other (comments)     dizziness    Lipitor [Atorvastatin] Other (comments)     Dizzy and nausea    Zetia [Ezetimibe] Other (comments)     headaches    Gabapentin Nausea and Vomiting     dizziness    Naproxen Nausea and Vomiting    Prednisone Nausea and Vomiting       Past Medical History:   Diagnosis Date    Aortic valvar stenosis 2/23/2015    Carotid stenosis, bilateral     CMT (Charcot Louise Tooth) disease     High risk for fracture due to osteoporosis by DEXA scan 2/23/2015    Hypercholesteremia 3/15/2010    Osteopenia     RA (rheumatoid arthritis) ([facility] [address] 3/15/2010       Past Surgical History:   Procedure Laterality Date    HX GYN      BTL    RENAL SCOPE,REMV FB/STONE         Social History     Socioeconomic History    Marital status:      Spouse name: Not on file    Number of children: Not on file    Years of education: Not on file    Highest education level: Not on file   Occupational History    Not on file   Social Needs    Financial resource strain: Not on file    Food insecurity     Worry: Not on file Inability: Not on file    Transportation needs     Medical: Not on file     Non-medical: Not on file   Tobacco Use    Smoking status: Never Smoker    Smokeless tobacco: Never Used   Substance and Sexual Activity    Alcohol use: No     Alcohol/week: 0.0 standard drinks    Drug use: No    Sexual activity: Not on file   Lifestyle    Physical activity     Days per week: Not on file     Minutes per session: Not on file    Stress: Not on file   Relationships    Social connections     Talks on phone: Not on file     Gets together: Not on file     Attends Orthodoxy service: Not on file     Active member of club or organization: Not on file     Attends meetings of clubs or organizations: Not on file     Relationship status: Not on file    Intimate partner violence     Fear of current or ex partner: Not on file     Emotionally abused: Not on file     Physically abused: Not on file     Forced sexual activity: Not on file   Other Topics Concern    Not on file   Social History Narrative    Not on file       Family History   Problem Relation Age of Onset    Thyroid Disease Mother     Elevated Lipids Mother     Heart Disease Mother     Stroke Mother 48    Cancer Maternal Aunt         breast    Breast Cancer Maternal Aunt         over 48    Cancer Maternal Grandmother         throat/was snuff user       Current Outpatient Medications   Medication Sig    cholecalciferol (VITAMIN D3) (1000 Units /25 mcg) tablet Take  by mouth daily.  methotrexate (RHEUMATREX) 2.5 mg tablet Take 8 Tabs by mouth every Sunday.  folic acid (FOLVITE) 1 mg tablet Take 1 Tab by mouth daily.  rosuvastatin (CRESTOR) 40 mg tablet TAKE ONE TABLET BY MOUTH AT BEDTIME    amLODIPine (NORVASC) 5 mg tablet Take 1 Tab by mouth daily.  evolocumab (REPATHA SYRINGE) syringe 1 mL by SubCUTAneous route every fourteen (14) days.     esomeprazole (NEXIUM) 40 mg capsule     clotrimazole-betamethasone (LOTRISONE) topical cream      No current facility-administered medications for this visit. Review of Systems   Constitutional: Negative for chills, diaphoresis, fever, malaise/fatigue and weight loss. Respiratory: Negative. Negative for cough, hemoptysis and shortness of breath. Cardiovascular: Negative. Negative for chest pain and palpitations. Gastrointestinal: Positive for heartburn (stable on Nexium). Negative for abdominal pain, nausea and vomiting. Genitourinary: Negative. Negative for dysuria, frequency and urgency. Musculoskeletal: Positive for joint pain (hx RA, bilateral heel pain). Skin: Negative for rash. Neurological: Negative. Negative for dizziness, tingling, sensory change, focal weakness and headaches. Psychiatric/Behavioral: Negative for depression. The patient is nervous/anxious. Vitals:    09/03/20 0743   BP: 136/63   Pulse: 88   Resp: 12   Temp: 97.5 °F (36.4 °C)   TempSrc: Skin   SpO2: 100%   Weight: 125 lb (56.7 kg)   Height: 4' 11\" (1.499 m)       Physical Exam  Vitals signs reviewed. Constitutional:       Appearance: Normal appearance. She is well-developed, well-groomed and normal weight. Cardiovascular:      Rate and Rhythm: Normal rate and regular rhythm. Heart sounds: Murmur present. Systolic murmur present. Pulmonary:      Effort: Pulmonary effort is normal.      Breath sounds: Normal breath sounds. Abdominal:      General: Abdomen is flat. Bowel sounds are normal.      Palpations: Abdomen is soft. Tenderness: There is no abdominal tenderness. Musculoskeletal:      Right lower leg: No edema. Left lower leg: No edema. Feet:      Right foot:      Toenail Condition: Right toenails are abnormally thick. Fungal disease present. Left foot:      Toenail Condition: Left toenails are abnormally thick. Fungal disease present.      Comments: Right 2nd toenail and left great toenail thickened, right 2nd toenail curved over end of nail, both with yellow/brownish discoloration. Skin:     General: Skin is warm and dry. Neurological:      Mental Status: She is alert and oriented to person, place, and time. Psychiatric:         Behavior: Behavior normal. Behavior is cooperative. Thought Content: Thought content normal.         Judgment: Judgment normal.       ASSESSMENT and PLAN    ICD-10-CM ICD-9-CM    1. Dyslipidemia, goal LDL below 100  E78.5 272.4 LIPID PANEL   2. Essential hypertension, benign  A27 679.3 METABOLIC PANEL, COMPREHENSIVE      CBC WITH AUTOMATED DIFF   3. Systolic murmur  S92.4 592.1 ECHO ADULT COMPLETE   4. Onychomycosis  B35.1 110.1    5. Rheumatoid arthritis involving multiple sites, unspecified rheumatoid factor presence (HCC)  M06.9 714.0    6. CMT (Charcot-Louise-Tooth disease)  G60.0 356.1    7. Osteoporosis, unspecified osteoporosis type, unspecified pathological fracture presence  M81.0 733.00 VITAMIN D, 25 HYDROXY   8. Pulmonary nodule  R91.1 793.11      Encounter Diagnoses   Name Primary?  Dyslipidemia, goal LDL below 100 Yes    Essential hypertension, benign     Systolic murmur     Onychomycosis     Rheumatoid arthritis involving multiple sites, unspecified rheumatoid factor presence (HCC)     CMT (Charcot-Louise-Tooth disease)     Osteoporosis, unspecified osteoporosis type, unspecified pathological fracture presence     Pulmonary nodule      Orders Placed This Encounter    LIPID PANEL    METABOLIC PANEL, COMPREHENSIVE    VITAMIN D, 25 HYDROXY    CBC WITH AUTOMATED DIFF    cholecalciferol (VITAMIN D3) (1000 Units /25 mcg) tablet    methotrexate (RHEUMATREX) 2.5 mg tablet    folic acid (FOLVITE) 1 mg tablet    esomeprazole (NEXIUM) 40 mg capsule    clotrimazole-betamethasone (LOTRISONE) topical cream     Diagnoses and all orders for this visit:    1. Dyslipidemia, goal LDL below 100 - on repatha, check LDL,  Last LDL 70 in Jan 2020  -     LIPID PANEL    2. Essential hypertension, benign - stable.   -     METABOLIC PANEL, COMPREHENSIVE  -     CBC WITH AUTOMATED DIFF    3. Systolic murmur - last echo in 2014, obtain updated echo  -     ECHO ADULT COMPLETE; Future    4. Onychomycosis    5. Rheumatoid arthritis involving multiple sites, unspecified rheumatoid factor presence ([address] - per rheumatology    6. CMT (Charcot-Louise-Tooth disease)    7. Osteoporosis, unspecified osteoporosis type, unspecified pathological fracture presence  -     VITAMIN D, 25 HYDROXY    8.  pulmonary nodule - no risk factors, due to have CT chest next week    Follow-up and Dispositions    · Return in about 6 months (around 3/3/2021), or if symptoms worsen or fail to improve.       lab results and schedule of future lab studies reviewed with patient  cardiovascular risk and specific lipid/LDL goals reviewed    I have reviewed the patient's allergies and made any necessary changes. Medical, procedural, social and family histories have been reviewed and updated as medically indicated. I have reconciled and/or revised patient medications in the EMR. I have discussed each diagnosis listed in this note with Zahra Watson and/or their family. I have discussed treatment options and the risk/benefit analysis of those options, including safe use of medications and possible medication side effects. Through the use of shared decision making we have agreed to the above plan. The patient has received an after-visit summary and questions were answered concerning future plans. KHADAR Manuel      This note will not be viewable in [employer].

## 2020-09-03 NOTE — PROGRESS NOTES
Chief Complaint   Patient presents with    Hypertension    Cholesterol Problem     Pt had xray of chest  And had a pulmonary nodule. PT will have CT scan     1. Have you been to the ER, urgent care clinic since your last visit? Hospitalized since your last visit? No    2. Have you seen or consulted any other health care providers outside of the [address] [place] [address] since your last visit? Include any pap smears or colon screening. Yes, Kyle. Yes, Podiatry and was discovered to have rupture achilies heel.      Health Maintenance Due   Topic Date Due    PAP AKA CERVICAL CYTOLOGY  02/23/2018    Flu Vaccine (1) 09/01/2020

## 2020-09-04 LAB
25(OH)D3+25(OH)D2 SERPL-MCNC: 39.2 NG/ML (ref 30–100)
ALBUMIN SERPL-MCNC: 4.7 G/DL (ref 3.8–4.9)
ALBUMIN/GLOB SERPL: 2 {RATIO} (ref 1.2–2.2)
ALP SERPL-CCNC: 71 IU/L (ref 39–117)
ALT SERPL-CCNC: 21 IU/L (ref 0–32)
AST SERPL-CCNC: 20 IU/L (ref 0–40)
BASOPHILS # BLD AUTO: 0 X10E3/UL (ref 0–0.2)
BASOPHILS NFR BLD AUTO: 1 %
BILIRUB SERPL-MCNC: 0.4 MG/DL (ref 0–1.2)
BUN SERPL-MCNC: 12 MG/DL (ref 6–24)
BUN/CREAT SERPL: 21 (ref 9–23)
CALCIUM SERPL-MCNC: 9.2 MG/DL (ref 8.7–10.2)
CHLORIDE SERPL-SCNC: 107 MMOL/L (ref 96–106)
CHOLEST SERPL-MCNC: 138 MG/DL (ref 100–199)
CO2 SERPL-SCNC: 24 MMOL/L (ref 20–29)
CREAT SERPL-MCNC: 0.58 MG/DL (ref 0.57–1)
EOSINOPHIL # BLD AUTO: 0.1 X10E3/UL (ref 0–0.4)
EOSINOPHIL NFR BLD AUTO: 1 %
ERYTHROCYTE [DISTWIDTH] IN BLOOD BY AUTOMATED COUNT: 14.7 % (ref 11.7–15.4)
GLOBULIN SER CALC-MCNC: 2.4 G/DL (ref 1.5–4.5)
GLUCOSE SERPL-MCNC: 97 MG/DL (ref 65–99)
HCT VFR BLD AUTO: 42 % (ref 34–46.6)
HDLC SERPL-MCNC: 59 MG/DL
HGB BLD-MCNC: 14 G/DL (ref 11.1–15.9)
IMM GRANULOCYTES # BLD AUTO: 0 X10E3/UL (ref 0–0.1)
IMM GRANULOCYTES NFR BLD AUTO: 0 %
INTERPRETATION, 910389: NORMAL
LDLC SERPL CALC-MCNC: 62 MG/DL (ref 0–99)
LYMPHOCYTES # BLD AUTO: 1.5 X10E3/UL (ref 0.7–3.1)
LYMPHOCYTES NFR BLD AUTO: 20 %
MCH RBC QN AUTO: 29.7 PG (ref 26.6–33)
MCHC RBC AUTO-ENTMCNC: 33.3 G/DL (ref 31.5–35.7)
MCV RBC AUTO: 89 FL (ref 79–97)
MONOCYTES # BLD AUTO: 0.4 X10E3/UL (ref 0.1–0.9)
MONOCYTES NFR BLD AUTO: 5 %
NEUTROPHILS # BLD AUTO: 5.6 X10E3/UL (ref 1.4–7)
NEUTROPHILS NFR BLD AUTO: 73 %
PLATELET # BLD AUTO: 254 X10E3/UL (ref 150–450)
POTASSIUM SERPL-SCNC: 3.9 MMOL/L (ref 3.5–5.2)
PROT SERPL-MCNC: 7.1 G/DL (ref 6–8.5)
RBC # BLD AUTO: 4.72 X10E6/UL (ref 3.77–5.28)
SODIUM SERPL-SCNC: 143 MMOL/L (ref 134–144)
TRIGL SERPL-MCNC: 87 MG/DL (ref 0–149)
VLDLC SERPL CALC-MCNC: 17 MG/DL (ref 5–40)
WBC # BLD AUTO: 7.6 X10E3/UL (ref 3.4–10.8)

## 2020-09-07 NOTE — PROGRESS NOTES
RECOMMENDATIONS:  Liver and kidney function normal.  Blood counts normal (not anemic).    Vitamin D normal.  Cholesterol numbers look great!!!!!

## 2020-09-08 ENCOUNTER — HOSPITAL ENCOUNTER (OUTPATIENT)
Dept: CT IMAGING | Age: 55
Discharge: HOME | End: 2020-09-08
Attending: INTERNAL MEDICINE
Payer: COMMERCIAL

## 2020-09-08 DIAGNOSIS — R91.1 PULMONARY NODULE: ICD-10-CM

## 2020-09-08 PROCEDURE — 71250 CT THORAX DX C-: CPT

## 2020-10-27 ENCOUNTER — HOSPITAL ENCOUNTER (OUTPATIENT)
Dept: NON INVASIVE DIAGNOSTICS | Age: 55
Discharge: HOME | End: 2020-10-27
Payer: COMMERCIAL

## 2020-10-27 VITALS — BODY MASS INDEX: 25.2 KG/M2 | HEIGHT: 59 IN | WEIGHT: 125 LBS

## 2020-10-27 DIAGNOSIS — R01.1 SYSTOLIC MURMUR: ICD-10-CM

## 2020-10-27 LAB
ECHO AO ROOT DIAM: 2.12 CM
ECHO AV AREA PEAK VELOCITY: 1.49 CM2
ECHO AV AREA PEAK VELOCITY: 1.49 CM2
ECHO AV AREA PEAK VELOCITY: 1.51 CM2
ECHO AV AREA PEAK VELOCITY: 1.51 CM2
ECHO AV AREA VTI: 1.74 CM2
ECHO AV AREA/BSA VTI: 1.2 CM2/M2
ECHO AV CUSP MM: 1.15 CM
ECHO AV MEAN GRADIENT: 8.08 MMHG
ECHO AV PEAK GRADIENT: 16.43 MMHG
ECHO AV PEAK GRADIENT: 16.43 MMHG
ECHO AV PEAK VELOCITY: 202.68 CM/S
ECHO AV PEAK VELOCITY: 202.68 CM/S
ECHO AV VTI: 32.64 CM
ECHO EST RA PRESSURE: 10 MMHG
ECHO LA AREA 4C: 15.85 CM2
ECHO LA MAJOR AXIS: 2.87 CM
ECHO LA MINOR AXIS: 1.9 CM
ECHO LA TO AORTIC ROOT RATIO: 1
ECHO LA TO AORTIC ROOT RATIO: 1
ECHO LA VOL 2C: 35.27 ML (ref 22–52)
ECHO LA VOL 4C: 44.87 ML (ref 22–52)
ECHO LA VOL BP: 45.22 ML (ref 22–52)
ECHO LA VOL/BSA BIPLANE: 29.94 ML/M2 (ref 16–28)
ECHO LA VOLUME INDEX A2C: 23.36 ML/M2 (ref 16–28)
ECHO LA VOLUME INDEX A4C: 29.71 ML/M2 (ref 16–28)
ECHO LV E' LATERAL VELOCITY: 10.31 CM/S
ECHO LV E' SEPTAL VELOCITY: 8.62 CM/S
ECHO LV INTERNAL DIMENSION DIASTOLIC: 3.59 CM (ref 3.9–5.3)
ECHO LV INTERNAL DIMENSION SYSTOLIC: 2.24 CM
ECHO LV IVSD: 1.57 CM (ref 0.6–0.9)
ECHO LV IVSS: 1.72 CM
ECHO LV MASS 2D: 214.6 G (ref 67–162)
ECHO LV MASS INDEX 2D: 142.1 G/M2 (ref 43–95)
ECHO LV POSTERIOR WALL DIASTOLIC: 1.56 CM (ref 0.6–0.9)
ECHO LV POSTERIOR WALL SYSTOLIC: 1.98 CM
ECHO LVOT CARDIAC OUTPUT: 5.39 LITER/MINUTE
ECHO LVOT DIAM: 2.09 CM
ECHO LVOT PEAK GRADIENT: 3.07 MMHG
ECHO LVOT PEAK GRADIENT: 3.18 MMHG
ECHO LVOT PEAK VELOCITY: 87.67 CM/S
ECHO LVOT PEAK VELOCITY: 89.15 CM/S
ECHO LVOT SV: 56.7 ML
ECHO LVOT VTI: 16.46 CM
ECHO MV A VELOCITY: 76.17 CM/S
ECHO MV E DECELERATION TIME (DT): 122.6 MS
ECHO MV E VELOCITY: 76.42 CM/S
ECHO MV E/A RATIO: 1
ECHO MV E/E' LATERAL: 7.41
ECHO MV E/E' RATIO (AVERAGED): 8.14
ECHO MV E/E' SEPTAL: 8.87
ECHO PV MAX VELOCITY: 123.71 CM/S
ECHO PV PEAK INSTANTANEOUS GRADIENT SYSTOLIC: 6.13 MMHG
ECHO RIGHT VENTRICULAR SYSTOLIC PRESSURE (RVSP): 22.58 MMHG
ECHO RV INTERNAL DIMENSION: 2.59 CM
ECHO TV REGURGITANT MAX VELOCITY: 177.32 CM/S
ECHO TV REGURGITANT MAX VELOCITY: 574.93 CM/S
ECHO TV REGURGITANT PEAK GRADIENT: 12.58 MMHG
LVOT MG: 1.33 MMHG

## 2020-10-27 PROCEDURE — 93306 TTE W/DOPPLER COMPLETE: CPT

## 2020-10-28 NOTE — PROGRESS NOTES
Echo (ultrasound of heart) shows heart failure with preserved ejection fraction (the amount of blood pumped out of the heart to the body). This means the heart is too stiff. When the heart pumps, it doesn't relax and fill with blood normally. This type of heart failure is also known as \"diastolic heart failure\" as the results indicate. This can be due to high blood pressure or clogged arteries. Would like for you to see cardiology for evaluation and possible stress test to make sure your heart is good.

## 2020-10-29 ENCOUNTER — TELEPHONE (OUTPATIENT)
Dept: FAMILY MEDICINE CLINIC | Age: 55
End: 2020-10-29

## 2020-10-29 DIAGNOSIS — I50.30 DIASTOLIC HEART FAILURE, UNSPECIFIED HF CHRONICITY (HCC): Primary | ICD-10-CM

## 2020-10-29 NOTE — TELEPHONE ENCOUNTER
----- Message from Alia Bhatia NP sent at 10/28/2020  4:44 PM EDT -----  Echo (ultrasound of heart) shows heart failure with preserved ejection fraction (the amount of blood pumped out of the heart to the body). This means the heart is too stiff. When the heart pumps, it doesn't relax and fill with blood normally. This type of heart failure is also known as \"diastolic heart failure\" as the results indicate. This can be due to high blood pressure or clogged arteries. Would like for you to see cardiology for evaluation and possible stress test to make sure your heart is good.

## 2020-10-30 ENCOUNTER — TELEPHONE (OUTPATIENT)
Dept: FAMILY MEDICINE CLINIC | Age: 55
End: 2020-10-30

## 2020-10-30 NOTE — TELEPHONE ENCOUNTER
----- Message from Reola Dose sent at 10/30/2020  2:40 PM EDT -----  Regarding: Nora/Telephone  Caller's first and last name and relationship (if not the patient): n/a  Best contact number(s): [phone]  Whose call is being returned: Keisha Rodriguez  Details to clarify the request: Ms. Isaias Milan is returning call to Keisha Rodriguez

## 2020-11-02 ENCOUNTER — TRANSCRIBE ORDER (OUTPATIENT)
Dept: SCHEDULING | Age: 55
End: 2020-11-02

## 2020-11-02 DIAGNOSIS — Z12.31 VISIT FOR SCREENING MAMMOGRAM: Primary | ICD-10-CM

## 2020-11-05 ENCOUNTER — TELEPHONE (OUTPATIENT)
Dept: FAMILY MEDICINE CLINIC | Age: 55
End: 2020-11-05

## 2020-11-05 NOTE — TELEPHONE ENCOUNTER
Verified patient with two type of identifiers. Informed pt of ECHO results and/or prescription(s). Pt verbalized understanding. Pt informed to call if she has not heard from [facility] by Monday.

## 2020-11-05 NOTE — TELEPHONE ENCOUNTER
----- Message from Nicolás Rai sent at 11/5/2020  9:02 AM EST -----  Regarding: /Telephone    Patient return call    Caller's first and last name and relationship (if not the patient): Self      Best contact number(s): [phone]      Whose call is being returned: Akirail Lidia      Details to clarify the request: Pt returning Nora's call. Not sure why nurse is calling.        Nicolás Rai

## 2020-11-19 NOTE — PROGRESS NOTES
Subjective/HPI:     Maine Paez is a 48 y.o. female is here for new patient consultation. The patient has medical hx significant for HTN, Hyperlipidemia, carotid stenosis, and RA. The pt presents with a heart murmur. She was in her PCP office for a check up and a murmur was auscultated. Recommended she see a cardiologist to establish care and have an echo. She denies CP, SOB/CONDE, orthopnea, PND, edema, lightheadedness, palpitations, or syncope. Previous cardiac evaluation includes echo 10/2020. Family med hx significant for HTN, Hyperlipidemia, cancer.     Patient Active Problem List    Diagnosis Date Noted    High risk for fracture due to osteoporosis by DEXA scan 02/23/2015    Cardiovascular risk factor 02/23/2015    Cardiac risk counseling 02/23/2015    Aortic valvar stenosis 02/23/2015    Osteoporosis 09/27/2014     Class: Chronic    Calculus of kidney     Essential hypertension, benign 12/10/2010     Class: Chronic    Encounter for long-term (current) use of other medications 10/22/2010    Carotid stenosis, bilateral      Class: Chronic    RA (rheumatoid arthritis) ([address] 03/15/2010     Class: Chronic    Dyslipidemia, goal LDL below 100 03/15/2010     Class: Chronic    CMT (Charcot-Louise-Tooth disease) 1965     Class: Chronic      Ruby Gravely, NP  Past Medical History:   Diagnosis Date    Aortic valvar stenosis 2/23/2015    Carotid stenosis, bilateral     CMT (Charcot Louise Tooth) disease     Essential hypertension, benign 12/10/2010    High risk for fracture due to osteoporosis by DEXA scan 2/23/2015    Hypercholesteremia 3/15/2010    Myocardial infarction ([address]     Osteopenia     RA (rheumatoid arthritis) ([address] 3/15/2010      Past Surgical History:   Procedure Laterality Date    HX GYN      BTL    RENAL SCOPE,REMV FB/STONE       Allergies   Allergen Reactions    Flomax [Tamsulosin] Other (comments)     Severe headache    Percocet [Oxycodone-Acetaminophen] Nausea and Vomiting and Other (comments)     dizziness    Lipitor [Atorvastatin] Other (comments)     Dizzy and nausea    Zetia [Ezetimibe] Other (comments)     headaches    Gabapentin Nausea and Vomiting     dizziness    Naproxen Nausea and Vomiting    Prednisone Nausea and Vomiting      Family History   Problem Relation Age of Onset    Thyroid Disease Mother     Elevated Lipids Mother     Heart Disease Mother     Stroke Mother 48    Cancer Maternal Aunt         breast    Breast Cancer Maternal Aunt         over 48    Cancer Maternal Grandmother         throat/was snuff user      Current Outpatient Medications   Medication Sig    Blood Pressure Monitor kit Please provide with blood pressure monitor, trends elevated in office thinking it is white coat HTN, want to confirm    cholecalciferol (VITAMIN D3) (1000 Units /25 mcg) tablet Take  by mouth daily.  methotrexate (RHEUMATREX) 2.5 mg tablet Take 8 Tabs by mouth every Sunday.  folic acid (FOLVITE) 1 mg tablet Take 1 Tab by mouth daily.  rosuvastatin (CRESTOR) 40 mg tablet TAKE ONE TABLET BY MOUTH AT BEDTIME    amLODIPine (NORVASC) 5 mg tablet Take 1 Tab by mouth daily.  evolocumab (REPATHA SYRINGE) syringe 1 mL by SubCUTAneous route every fourteen (14) days. No current facility-administered medications for this visit. Vitals:    11/20/20 1441 11/20/20 1448   BP: (!) 166/68 (!) 160/66   Pulse: 97    Resp: 18    SpO2: 98%    Weight: 124 lb 4.8 oz (56.4 kg)    Height: 4' 11\" (1.499 m)        I have reviewed the nurses notes, vitals, problem list, allergy list, medical history, family, social history and medications. Review of Symptoms:  General: Pt denies excessive weight gain or loss. Pt is able to conduct ADL's  HEENT: Denies blurred vision, headaches, epistaxis and difficulty swallowing. Respiratory: Denies shortness of breath, CONDE, wheezing or stridor.   Cardiovascular: Denies precordial pain, palpitations, edema or PND  Gastrointestinal: Denies poor appetite, indigestion, abdominal pain or blood in stool  Urinary: Denies dysuria, pyuria  Musculoskeletal: Denies pain or swelling from muscles or joints  Neurologic: Denies tremor, paresthesias, or sensory motor disturbance  Skin: Denies rash, itching or texture change. Psych: Denies depression        Physical Exam:      General: Well developed, cooperative, alert in no acute distress, appears states age. HEENT: Supple, No carotid bruits, no JVD, trach is midline. PERRL, EOM intact  Heart:  Normal S1/S2 negative S3 or S4. Regular,+LILY, no gallop or rub. Respiratory: Clear bilaterally x 4, no wheezing or rales  Abdomen:   Soft, non-tender, no masses, bowel sounds are active. Extremities:  No edema, normal cap refill, no cyanosis, atraumatic. Neuro: A&Ox3, speech clear, gait stable. Skin: Skin color is normal. No rashes or lesions. Non diaphoretic  Vascular: 2+ pulses symmetric in all extremities    Cardiographics    ECG: SR, nonspecific ST and T wave abnormality    Echo 10/27/20  Left Ventricle  Normal cavity size and systolic function (ejection fraction normal). Upper normal wall thickness. The estimated EF is 60 - 65%. There is moderate (grade 2) left ventricular diastolic dysfunction. Wall Scoring  The following segments are normal: basal anteroseptal, basal inferolateral, mid anteroseptal, mid inferolateral, apical anterior, apical septal, apical inferior, apical lateral and apex. Other segments could not be evaluated. Left Atrium  Normal cavity size. Right Ventricle  Normal cavity size and global systolic function. Right Atrium  Normal cavity size. Aortic Valve  No stenosis and no regurgitation. Probably trileaflet aortic valve. Mild aortic valve sclerosis with no significant stenosis. Aortic valve peak gradient is 16.3 mmHg. Aortic valve mean gradient is 8.3 mmHg. Aortic valve area is 1.5 cm2. Aortic valve peak velocity is 2 cm/s.  Trace AS Mitral Valve  Normal valve structure and no stenosis. Trace regurgitation. Tricuspid Valve  Normal valve structure and no stenosis. Tricuspid regurgitation is inadequate for estimation of right ventricular systolic pressure. Pulmonic Valve  Normal valve structure, no stenosis and no regurgitation. Aorta  Normal aortic root, ascending aortic, and aortic arch. IVC/Hepatic Veins  Normal structure. Normal central venous pressure (0-5 mmHg); IVC diameter is less than 21 mm and collapses more than 50% with respiration. Pericardium  No evidence of pericardial effusion.          Results for orders placed or performed during the hospital encounter of 06/04/19   EKG, 12 LEAD, INITIAL   Result Value Ref Range    Ventricular Rate 100 BPM    Atrial Rate 100 BPM    P-R Interval 142 ms    QRS Duration 84 ms    Q-T Interval 340 ms    QTC Calculation (Bezet) 438 ms    Calculated P Axis 75 degrees    Calculated R Axis 55 degrees    Calculated T Axis 31 degrees    Diagnosis       Normal sinus rhythm  Nonspecific ST and T wave abnormality  No previous ECGs available  Confirmed by Elliott Lentz ([id number]) on 6/5/2019 9:47:11 AM           Cardiology Labs:    Lab Results   Component Value Date/Time    Cholesterol, total 138 09/03/2020 08:30 AM    HDL Cholesterol 59 09/03/2020 08:30 AM    LDL, calculated 71 01/28/2020 10:00 AM    LDL, calculated 156 (H) 11/26/2019 01:15 PM    LDL, calculated 176 (H) 04/19/2019 09:24 AM    LDL Chol Calc (NIH) 62 09/03/2020 08:30 AM    VLDL, calculated 16 01/28/2020 10:00 AM    VLDL Cholesterol Jose 17 09/03/2020 08:30 AM     Lab Results   Component Value Date/Time    Sodium 143 09/03/2020 08:30 AM    Potassium 3.9 09/03/2020 08:30 AM    Chloride 107 (H) 09/03/2020 08:30 AM    CO2 24 09/03/2020 08:30 AM    Glucose 97 09/03/2020 08:30 AM    BUN 12 09/03/2020 08:30 AM    Creatinine 0.58 09/03/2020 08:30 AM    BUN/Creatinine ratio 21 09/03/2020 08:30 AM    GFR est  09/03/2020 08:30 AM    GFR est non- 09/03/2020 08:30 AM    Calcium 9.2 09/03/2020 08:30 AM    Anion gap 7 02/12/2020 10:54 AM    Bilirubin, total 0.4 09/03/2020 08:30 AM    ALT (SGPT) 21 09/03/2020 08:30 AM    Alk. phosphatase 71 09/03/2020 08:30 AM    Protein, total 7.1 09/03/2020 08:30 AM    Albumin 4.7 09/03/2020 08:30 AM    Globulin 4.1 (H) 06/04/2019 10:25 PM    A-G Ratio 2.0 09/03/2020 08:30 AM     Lab Results   Component Value Date/Time    Hemoglobin A1c (POC) 5.7 07/31/2015 08:35 AM        Assessment:     Assessment:      Diagnoses and all orders for this visit:    1. Systolic murmur    2. Essential hypertension, benign    3. Dyslipidemia, goal LDL below 100    4. Carotid stenosis, bilateral    5. Rheumatoid arthritis involving multiple sites, unspecified whether rheumatoid factor present ([facility])    6. Aortic valve stenosis, etiology of cardiac valve disease unspecified  -     AMB POC EKG ROUTINE W/ 12 LEADS, INTER & REP    Other orders  -     Blood Pressure Monitor kit; Please provide with blood pressure monitor, trends elevated in office thinking it is white coat HTN, want to confirm      Specialty Problems        Cardiology Problems    Dyslipidemia, goal LDL below 100        Carotid stenosis, bilateral        Essential hypertension, benign        Aortic valvar stenosis              ICD-10-CM ICD-9-CM    1. Systolic murmur  Y85.1 250.0    2. Essential hypertension, benign  I10 401.1    3. Dyslipidemia, goal LDL below 100  E78.5 272.4    4. Carotid stenosis, bilateral  I65.23 433.10      433.30    5. Rheumatoid arthritis involving multiple sites, unspecified whether rheumatoid factor present ([facility])  M06.9 714.0    6. Aortic valve stenosis, etiology of cardiac valve disease unspecified  I35.0 424.1 AMB POC EKG ROUTINE W/ 12 LEADS, INTER & REP         PLAN:  1. Systolic murmur/Aortic stenosis  Heart murmur auscultated by PCP. Hx of mild AS per echo in 2014. Echo performed 10/2020 showed mild sclerosis, no stenosis or regurg.  EF preserved. GR II DD. Continue with HTN management. 2. Essential hypertension, benign  Well controlled at most appts, elevated today. Continue with current anti hypertensive regimen    3. Dyslipidemia, goal LDL below 100  On Repatha and Crestor. 9/3/20 LDL 62. Managed by PCP    4. Carotid stenosis, bilateral  Carotid duplex 11/2017 showed <50% perez stenosis ICA. Asymptomatic. Cont statin/Repatha    5. Rheumatoid arthritis involving multiple sites, unspecified whether rheumatoid factor present [facility])  Managed by PCP.      F/U in 1 year    Shanta Steiner MD

## 2020-11-19 NOTE — TELEPHONE ENCOUNTER
Spoke to pt and advised her that I had just reordered her crestor and that he PH was off in her urine and she needed to increase H2O. Pt advised her Urine will be rechecked her next visit. Isotretinoin Counseling: Patient should get monthly blood tests, not donate blood, not drive at night if vision affected, not share medication, and not undergo elective surgery for 6 months after tx completed. Side effects reviewed, pt to contact office should one occur.

## 2020-11-20 ENCOUNTER — OFFICE VISIT (OUTPATIENT)
Dept: CARDIOLOGY CLINIC | Age: 55
End: 2020-11-20
Payer: COMMERCIAL

## 2020-11-20 VITALS
HEIGHT: 59 IN | BODY MASS INDEX: 25.06 KG/M2 | DIASTOLIC BLOOD PRESSURE: 66 MMHG | HEART RATE: 97 BPM | SYSTOLIC BLOOD PRESSURE: 160 MMHG | RESPIRATION RATE: 18 BRPM | WEIGHT: 124.3 LBS | OXYGEN SATURATION: 98 %

## 2020-11-20 DIAGNOSIS — I10 ESSENTIAL HYPERTENSION, BENIGN: ICD-10-CM

## 2020-11-20 DIAGNOSIS — I35.0 AORTIC VALVE STENOSIS, ETIOLOGY OF CARDIAC VALVE DISEASE UNSPECIFIED: ICD-10-CM

## 2020-11-20 DIAGNOSIS — M06.9 RHEUMATOID ARTHRITIS INVOLVING MULTIPLE SITES, UNSPECIFIED WHETHER RHEUMATOID FACTOR PRESENT (HCC): ICD-10-CM

## 2020-11-20 DIAGNOSIS — R01.1 SYSTOLIC MURMUR: Primary | ICD-10-CM

## 2020-11-20 DIAGNOSIS — E78.5 DYSLIPIDEMIA, GOAL LDL BELOW 100: ICD-10-CM

## 2020-11-20 DIAGNOSIS — I65.23 CAROTID STENOSIS, BILATERAL: ICD-10-CM

## 2020-11-20 PROCEDURE — 93000 ELECTROCARDIOGRAM COMPLETE: CPT | Performed by: INTERNAL MEDICINE

## 2020-11-20 PROCEDURE — 99203 OFFICE O/P NEW LOW 30 MIN: CPT | Performed by: INTERNAL MEDICINE

## 2020-11-20 RX ORDER — ACETAMINOPHEN 500 MG
TABLET ORAL
Qty: 1 KIT | Refills: 0 | Status: SHIPPED | OUTPATIENT
Start: 2020-11-20

## 2020-11-20 NOTE — PROGRESS NOTES
1. Have you been to the ER, urgent care clinic since your last visit? Hospitalized since your last visit? No    2. Have you seen or consulted any other health care providers outside of the [address] [place] [address] since your last visit? Include any pap smears or colon screening. No    Chief Complaint   Patient presents with    Valvular Heart Disease     NP, ref by Dr. Gershon Holstein, discuss echo results. Denied cardiac symptoms.

## 2020-11-20 NOTE — LETTER
12/10/20 Patient: Elvira Pickard YOB: 1965 Date of Visit: 11/20/2020 Hollie Presley NP 
[address] [id number] VIA In Basket Dear Hollie Presley NP, Thank you for referring Ms. Ernestina Krueger to [facility] for evaluation. My notes for this consultation are attached. If you have questions, please do not hesitate to call me. I look forward to following your patient along with you.  
 
 
Sincerely, 
 
Lisa Nayak MD

## 2020-12-28 ENCOUNTER — HOSPITAL ENCOUNTER (OUTPATIENT)
Dept: MAMMOGRAPHY | Age: 55
Discharge: HOME | End: 2020-12-28
Payer: COMMERCIAL

## 2020-12-28 ENCOUNTER — APPOINTMENT (OUTPATIENT)
Dept: BONE DENSITY | Age: 55
End: 2020-12-28
Payer: COMMERCIAL

## 2020-12-28 DIAGNOSIS — Z12.31 VISIT FOR SCREENING MAMMOGRAM: ICD-10-CM

## 2020-12-28 PROCEDURE — 77067 SCR MAMMO BI INCL CAD: CPT

## 2021-01-05 DIAGNOSIS — E78.5 DYSLIPIDEMIA, GOAL LDL BELOW 100: Chronic | ICD-10-CM

## 2021-01-05 RX ORDER — EVOLOCUMAB 140 MG/ML
140 INJECTION, SOLUTION SUBCUTANEOUS
Qty: 2 SYRINGE | Refills: 5 | Status: SHIPPED | OUTPATIENT
Start: 2021-01-05 | End: 2021-01-08

## 2021-01-05 NOTE — TELEPHONE ENCOUNTER
Patent wants to get the medication for evolocumab (REPATHA SYRINGE) syringe.   If any questions please give her a call @ [phone]

## 2021-01-05 NOTE — TELEPHONE ENCOUNTER
Last OV: 9/3/20  Next Appt: 3/4/21  Last Refill: 2019    Requested Prescriptions     Pending Prescriptions Disp Refills    evolocumab (Repatha Syringe) syringe 2 Syringe 5     Si mL by SubCUTAneous route every fourteen (14) days.

## 2021-01-07 ENCOUNTER — DOCUMENTATION ONLY (OUTPATIENT)
Dept: FAMILY MEDICINE CLINIC | Age: 56
End: 2021-01-07

## 2021-01-07 DIAGNOSIS — E78.5 DYSLIPIDEMIA, GOAL LDL BELOW 100: Chronic | ICD-10-CM

## 2021-01-08 RX ORDER — EVOLOCUMAB 140 MG/ML
INJECTION, SOLUTION SUBCUTANEOUS
Qty: 6 SYRINGE | Refills: 3 | Status: SHIPPED | OUTPATIENT
Start: 2021-01-08 | End: 2021-03-04 | Stop reason: SDUPTHER

## 2021-01-13 ENCOUNTER — HOSPITAL ENCOUNTER (OUTPATIENT)
Dept: MAMMOGRAPHY | Age: 56
Discharge: HOME | End: 2021-01-13
Payer: COMMERCIAL

## 2021-01-13 DIAGNOSIS — R92.8 ABNORMAL MAMMOGRAM OF RIGHT BREAST: ICD-10-CM

## 2021-01-13 PROCEDURE — 77061 BREAST TOMOSYNTHESIS UNI: CPT

## 2021-01-13 PROCEDURE — 76642 ULTRASOUND BREAST LIMITED: CPT

## 2021-01-13 NOTE — PROGRESS NOTES
Mammogram and ultrasound on right breast showed a normal appearing lymph node. No mass or cyst!!  Great news!!  Repeat mammogram in 1 year.

## 2021-01-18 ENCOUNTER — TELEPHONE (OUTPATIENT)
Dept: FAMILY MEDICINE CLINIC | Age: 56
End: 2021-01-18

## 2021-01-18 NOTE — TELEPHONE ENCOUNTER
Jinny Hammer NP   1/13/2021  1:42 PM EST      Mammogram and ultrasound on right breast showed a normal appearing lymph node.  No mass or cyst!!  Great news! !  Repeat mammogram in 1 year.

## 2021-01-18 NOTE — TELEPHONE ENCOUNTER
Verified patient with two type of identifiers. Informed pt of mammogram results and/or prescription(s). Pt verbalized understanding.

## 2021-02-04 ENCOUNTER — DOCUMENTATION ONLY (OUTPATIENT)
Dept: FAMILY MEDICINE CLINIC | Age: 56
End: 2021-02-04

## 2021-02-04 ENCOUNTER — HOSPITAL ENCOUNTER (OUTPATIENT)
Dept: BONE DENSITY | Age: 56
Discharge: HOME | End: 2021-02-04
Payer: COMMERCIAL

## 2021-02-04 DIAGNOSIS — M85.80 OSTEOPENIA, UNSPECIFIED LOCATION: ICD-10-CM

## 2021-02-04 PROCEDURE — 77080 DXA BONE DENSITY AXIAL: CPT

## 2021-02-04 NOTE — PROGRESS NOTES
Copy of bone density - there has been slight decrease in bone density in left thigh/hip. I will have Nora check with [facility] to see if we can get Prolia covered.

## 2021-02-08 ENCOUNTER — TELEPHONE (OUTPATIENT)
Dept: FAMILY MEDICINE CLINIC | Age: 56
End: 2021-02-08

## 2021-02-08 NOTE — TELEPHONE ENCOUNTER
----- Message from Jason Marrero NP sent at 2/4/2021  5:52 PM EST -----  Copy of bone density - there has been slight decrease in bone density in left thigh/hip. I will have Nora check with [facility] to see if we can get Prolia covered.

## 2021-02-11 DIAGNOSIS — I10 ESSENTIAL HYPERTENSION, BENIGN: ICD-10-CM

## 2021-02-11 RX ORDER — AMLODIPINE BESYLATE 5 MG/1
TABLET ORAL
Qty: 90 TAB | Refills: 3 | Status: SHIPPED | OUTPATIENT
Start: 2021-02-11 | End: 2021-11-17 | Stop reason: SDUPTHER

## 2021-02-15 ENCOUNTER — HOSPITAL ENCOUNTER (OUTPATIENT)
Dept: INFUSION THERAPY | Age: 56
Discharge: HOME | End: 2021-02-15
Payer: COMMERCIAL

## 2021-02-15 VITALS
RESPIRATION RATE: 18 BRPM | DIASTOLIC BLOOD PRESSURE: 81 MMHG | SYSTOLIC BLOOD PRESSURE: 146 MMHG | HEART RATE: 106 BPM | TEMPERATURE: 98.9 F

## 2021-02-15 LAB
ANION GAP BLD CALC-SCNC: 13 MMOL/L (ref 10–20)
BUN BLD-MCNC: 13 MG/DL (ref 9–20)
CA-I BLD-MCNC: 1.14 MMOL/L (ref 1.12–1.32)
CHLORIDE BLD-SCNC: 105 MMOL/L (ref 98–107)
CO2 BLD-SCNC: 29 MMOL/L (ref 21–32)
CREAT BLD-MCNC: 0.6 MG/DL (ref 0.6–1.3)
GLUCOSE BLD-MCNC: 88 MG/DL (ref 65–100)
HCT VFR BLD CALC: 40 % (ref 35–47)
POTASSIUM BLD-SCNC: 4 MMOL/L (ref 3.5–5.1)
SERVICE CMNT-IMP: NORMAL
SODIUM BLD-SCNC: 142 MMOL/L (ref 136–145)

## 2021-02-15 PROCEDURE — 74011250636 HC RX REV CODE- 250/636: Performed by: FAMILY MEDICINE

## 2021-02-15 PROCEDURE — 80047 BASIC METABLC PNL IONIZED CA: CPT

## 2021-02-15 PROCEDURE — 96372 THER/PROPH/DIAG INJ SC/IM: CPT

## 2021-02-15 RX ORDER — HYDROGEN PEROXIDE 3 %
20 SOLUTION, NON-ORAL MISCELLANEOUS DAILY
COMMUNITY
End: 2022-04-01

## 2021-02-15 RX ADMIN — DENOSUMAB 60 MG: 60 INJECTION SUBCUTANEOUS at 16:05

## 2021-02-15 NOTE — PROGRESS NOTES
[facility]fusion [facility] Progress Note    1520  Pt arrived in stable condition and in no distress for Prolia    Assessment completed. Patient denied having any symptoms of COVID-19, i.e. SOB, coughing, fever, or generally not feeling well. Also denies having been exposed to COVID-19 recently or having had any recent contact with family/friend that has a pending COVID test.    Labs obtained per order and sent for processing. Patient Vitals for the past 12 hrs:   Temp Pulse Resp BP   02/15/21 1519 98.9 °F (37.2 °C) (!) 106 18 (!) 146/81        Recent Results (from the past 12 hour(s))   POC CHEM8    Collection Time: 02/15/21  3:56 PM   Result Value Ref Range    Calcium, ionized (POC) 1.14 1.12 - 1.32 mmol/L    Sodium (POC) 142 136 - 145 mmol/L    Potassium (POC) 4.0 3.5 - 5.1 mmol/L    Chloride (POC) 105 98 - 107 mmol/L    CO2 (POC) 29 21 - 32 mmol/L    Anion gap (POC) 13 10 - 20 mmol/L    Glucose (POC) 88 65 - 100 mg/dL    BUN (POC) 13 9 - 20 mg/dL    Creatinine (POC) 0.6 0.6 - 1.3 mg/dL    GFRAA, POC >60 >60 ml/min/1.73m2    GFRNA, POC >60 >60 ml/min/1.73m2    Hematocrit (POC) 40 35.0 - 47.0 %    Comment Comment Not Indicated. The following medications administered:  Medications Administered     denosumab (PROLIA) injection 60 mg     Admin Date  02/15/2021 Action  Given Dose  60 mg Route  SubCUTAneous Administered By  Jean Claude Ziegler RN                Pt tolerated treatment well. No s/s of adverse reaction noted. Pt provided with education on possible side effects of medication along with discharge instructions. Pt verbalized understanding. 1605  Pt discharged in no acute distress.  Next appointment:    Future Appointments   Date Time Provider Estrellita Glass   3/4/2021  9:00 AM Bekah Romano NP [facility] BS AMB   4/5/2021  1:30 PM MD [facility] BS AMB   8/16/2021 11:00 AM [facility] NURSE [address] [place] [address]   12/1/2021  9:15 AM Doreen Dior MD [facility] BS AMB

## 2021-02-16 ENCOUNTER — APPOINTMENT (OUTPATIENT)
Dept: INFUSION THERAPY | Age: 56
End: 2021-02-16

## 2021-03-04 ENCOUNTER — VIRTUAL VISIT (OUTPATIENT)
Dept: FAMILY MEDICINE CLINIC | Age: 56
End: 2021-03-04
Payer: COMMERCIAL

## 2021-03-04 DIAGNOSIS — E78.5 DYSLIPIDEMIA, GOAL LDL BELOW 100: Primary | Chronic | ICD-10-CM

## 2021-03-04 DIAGNOSIS — M81.0 OSTEOPOROSIS, UNSPECIFIED OSTEOPOROSIS TYPE, UNSPECIFIED PATHOLOGICAL FRACTURE PRESENCE: ICD-10-CM

## 2021-03-04 DIAGNOSIS — I10 ESSENTIAL HYPERTENSION, BENIGN: ICD-10-CM

## 2021-03-04 DIAGNOSIS — Z11.59 NEED FOR HEPATITIS C SCREENING TEST: ICD-10-CM

## 2021-03-04 DIAGNOSIS — Z71.85 VACCINE COUNSELING: ICD-10-CM

## 2021-03-04 DIAGNOSIS — G60.0 CMT (CHARCOT-MARIE-TOOTH DISEASE): ICD-10-CM

## 2021-03-04 PROCEDURE — 99214 OFFICE O/P EST MOD 30 MIN: CPT | Performed by: NURSE PRACTITIONER

## 2021-03-04 RX ORDER — EVOLOCUMAB 140 MG/ML
INJECTION, SOLUTION SUBCUTANEOUS
Qty: 6 SYRINGE | Refills: 3 | Status: SHIPPED | OUTPATIENT
Start: 2021-03-04 | End: 2021-11-17 | Stop reason: ALTCHOICE

## 2021-03-04 RX ORDER — DENOSUMAB 60 MG/ML
60 INJECTION SUBCUTANEOUS
COMMUNITY
End: 2021-09-20

## 2021-03-04 NOTE — Clinical Note
Put on as nurse visit on 3/17 at [place] [id number] for wt, BP, and pneumonia vaccine (23)  Schedule 6 month follow up and send message via Get[employer] - any day, [id number]  Mail lab slip to patient.

## 2021-03-04 NOTE — PROGRESS NOTES
Mednia Kirby (: 1965) is a 48 y.o. female, established patient, here for evaluation of the following chief complaint(s):   Hypertension and Cholesterol Problem       ASSESSMENT/PLAN:  1. Dyslipidemia, goal LDL below 100 - doing well with injections. Last LDL 62 in 2020. (highest  before Repatha). Need to check FLP  -     evolocumab (Repatha Syringe) syringe; INJECT 1 SYRINGE UNDER THE SKIN EVERY OTHER WEEK., Normal, Disp-6 Syringe, R-3  -     LIPID PANEL; Future  -     METABOLIC PANEL, COMPREHENSIVE; Future  2. Essential hypertension, benign  -     METABOLIC PANEL, COMPREHENSIVE; Future  3. Osteoporosis, unspecified osteoporosis type, unspecified pathological fracture presence - took Prolia injection on 2/15/21. Had DEXA in Aug 2020. Due for repeat in 2 years  4. CMT (Charcot-Louise-Tooth disease)  5. Vaccine counseling  -     Patient to come on 3/17/21 for nurse visit for PNA vaccine  -      Discussed COVID-19 vaccine. Nout sure how vaccine affects CMT, patient states daughter has hx of CMT and had vaccine. Encouraged possible discussion with neurologist.  Due to her high risk with hx of CAD, HTN, she should probably take vaccine when she can get it. 6. Need for hepatitis C screening test  -     HEPATITIS C AB; Future      Return in about 6 months (around 2021). SUBJECTIVE/OBJECTIVE:  HPI  Patient is seen virtually today for follow up HTN, cholesterol, osteoporosis. 3/23/21 - scheduled for colonoscopy. HTN - Taking amlodipine.  Tolerating medication.  Denies chest pains, palpitations, dyspnea.  Does not have way to monitor BP at home     Doing Repatha for cholesterol.  Last LDL 62  in 2020!! Needs to have prescription sent to local pharmacy as the mail order is no longer doing prescription    Hx osteoporosis - had Prolia injection on 2/15/21. DEXA on 21 - IMPRESSION  This patient is osteoporotic using the World Health Organization criteria.   As compared to the prior study, there has been a trend toward an interval decrease in the left femoral neck    She had CTA abd in 2016 - negative for AAA.  US in 2014 negative for AAA    Had CXR in Aug 2020, poss pulmonary nodule.  had CT chest on 9/8/20 - no pulmonary nodule noted.     Allergies   Allergen Reactions    Flomax [Tamsulosin] Other (comments)     Severe headache    Percocet [Oxycodone-Acetaminophen] Nausea and Vomiting and Other (comments)     dizziness    Lipitor [Atorvastatin] Other (comments)     Dizzy and nausea    Zetia [Ezetimibe] Other (comments)     headaches    Gabapentin Nausea and Vomiting     dizziness    Naproxen Nausea and Vomiting    Prednisone Nausea and Vomiting       Past Medical History:   Diagnosis Date    Aortic valvar stenosis 2/23/2015    Carotid stenosis, bilateral     CMT (Charcot Louise Tooth) disease     Essential hypertension, benign 12/10/2010    High risk for fracture due to osteoporosis by DEXA scan 2/23/2015    Hypercholesteremia 3/15/2010    [facility]ocardial infarction ([facility] [address]     Osteopenia     RA (rheumatoid arthritis) ([facility] [address] 3/15/2010       Past Surgical History:   Procedure Laterality Date    HX GYN      BTL    GA RENAL SCOPE,REMV FB/STONE         Social History     Socioeconomic History    Marital status:      Spouse name: Not on file    Number of children: Not on file    Years of education: Not on file    Highest education level: Not on file   Occupational History    Not on file   Social Needs    Financial resource strain: Not on file    Food insecurity     Worry: Not on file     Inability: Not on file    Transportation needs     Medical: Not on file     Non-medical: Not on file   Tobacco Use    Smoking status: Never Smoker    Smokeless tobacco: Never Used   Substance and Sexual Activity    Alcohol use: No     Alcohol/week: 0.0 standard drinks    Drug use: No    Sexual activity: Not Currently   Lifestyle    Physical activity     Days per week: Not on file Minutes per session: Not on file    Stress: Not on file   Relationships    Social connections     Talks on phone: Not on file     Gets together: Not on file     Attends Quaker service: Not on file     Active member of club or organization: Not on file     Attends meetings of clubs or organizations: Not on file     Relationship status: Not on file    Intimate partner violence     Fear of current or ex partner: Not on file     Emotionally abused: Not on file     Physically abused: Not on file     Forced sexual activity: Not on file   Other Topics Concern    Not on file   Social History Narrative    Not on file       Family History   Problem Relation Age of Onset    Thyroid Disease Mother     Elevated Lipids Mother     Heart Disease Mother     Stroke Mother 48    Cancer Maternal Aunt         breast    Breast Cancer Maternal Aunt         over 48    Cancer Maternal Grandmother         throat/was snuff user       Current Outpatient Medications   Medication Sig    denosumab (Prolia) 60 mg/mL injection 60 mg by SubCUTAneous route every 6 months.  esomeprazole (NexIUM) 20 mg capsule Take  by mouth daily.  amLODIPine (NORVASC) 5 mg tablet TAKE ONE TABLET BY MOUTH ONE TIME DAILY    Repatha Syringe syringe INJECT 1 SYRINGE UNDER THE SKIN EVERY OTHER WEEK.  Blood Pressure Monitor kit Please provide with blood pressure monitor, trends elevated in office thinking it is white coat HTN, want to confirm    cholecalciferol (VITAMIN D3) (1000 Units /25 mcg) tablet Take  by mouth daily.  methotrexate (RHEUMATREX) 2.5 mg tablet Take 8 Tabs by mouth every Sunday.  folic acid (FOLVITE) 1 mg tablet Take 1 Tab by mouth daily.  rosuvastatin (CRESTOR) 40 mg tablet TAKE ONE TABLET BY MOUTH AT BEDTIME     No current facility-administered medications for this visit. Review of Systems   Constitutional: Negative for chills, diaphoresis, fatigue, fever and unexpected weight change.    HENT: Positive for congestion (cold symptoms) and rhinorrhea. Respiratory: Negative for cough and shortness of breath. Cardiovascular: Negative for chest pain, palpitations and leg swelling. Gastrointestinal: Negative for abdominal pain, nausea and vomiting. Genitourinary: Negative for dysuria, flank pain, frequency, hematuria and urgency. Musculoskeletal: Negative for myalgias. Skin: Negative. Neurological: Negative for dizziness, speech difficulty, light-headedness, numbness and headaches. Psychiatric/Behavioral: Negative for dysphoric mood and sleep disturbance. The patient is not nervous/anxious. No flowsheet data found.     Physical Exam    [INSTRUCTIONS:  \"[x]\" Indicates a positive item  \"[]\" Indicates a negative item  -- DELETE ALL ITEMS NOT EXAMINED]    Constitutional: [x] Appears well-developed and well-nourished [x] No apparent distress      [] Abnormal -     Mental status: [x] Alert and awake  [x] Oriented to person/place/time [x] Able to follow commands    [] Abnormal -     Eyes:   EOM    [x]  Normal    [] Abnormal -   Sclera  [x]  Normal    [] Abnormal -          Discharge [x]  None visible   [] Abnormal -     HENT: [x] Normocephalic, atraumatic  [] Abnormal -   [x] Mouth/Throat: Mucous membranes are moist    External Ears [x] Normal  [] Abnormal -    Neck: [x] No visualized mass [] Abnormal -     Pulmonary/Chest: [x] Respiratory effort normal   [x] No visualized signs of difficulty breathing or respiratory distress        [] Abnormal -      Musculoskeletal:   [x] Normal gait with no signs of ataxia         [x] Normal range of motion of neck        [] Abnormal -     Neurological:        [x] No Facial Asymmetry (Cranial nerve 7 motor function) (limited exam due to video visit)          [x] No gaze palsy        [] Abnormal -          Skin:        [x] No significant exanthematous lesions or discoloration noted on facial skin         [] Abnormal -            Psychiatric:       [x] Normal Affect [] Abnormal -        [x] No Hallucinations    Other pertinent observable physical exam findings:- none        On this date 03/04/2021 I have spent 30 minutes reviewing previous notes, test results and face to face (virtual) with the patient discussing the diagnosis and importance of compliance with the treatment plan as well as documenting on the day of the visit. Sae Suarez, was evaluated through a synchronous (real-time) audio-video encounter. The patient (or guardian if applicable) is aware that this is a billable service. Verbal consent to proceed has been obtained within the past 12 months. The visit was conducted pursuant to the emergency declaration under the [facility], [address] [place] [address] authority and the [employer] General Act. Patient identification was verified, and a caregiver was present when appropriate. The patient was located in a state where the provider was credentialed to provide care. An electronic signature was used to authenticate this note.   -- Anusha Daniels NP

## 2021-03-04 NOTE — PROGRESS NOTES
Chief Complaint   Patient presents with    Hypertension    Cholesterol Problem       1. Have you been to the ER, urgent care clinic since your last visit? Hospitalized since your last visit? No    2. Have you seen or consulted any other health care providers outside of the [address] [place] [address] since your last visit? Include any pap smears or colon screening.  No    Health Maintenance Due   Topic Date Due    Hepatitis C Screening  Never done    Pneumococcal 0-64 years (1 of 1 - PPSV23) Never done    Shingrix Vaccine Age 50> (1 of 2) Never done    PAP AKA CERVICAL CYTOLOGY  02/23/2018

## 2021-03-13 LAB
ALBUMIN SERPL-MCNC: 4.6 G/DL (ref 3.8–4.9)
ALBUMIN/GLOB SERPL: 1.9 {RATIO} (ref 1.2–2.2)
ALP SERPL-CCNC: 90 IU/L (ref 39–117)
ALT SERPL-CCNC: 21 IU/L (ref 0–32)
AST SERPL-CCNC: 23 IU/L (ref 0–40)
BILIRUB SERPL-MCNC: 0.4 MG/DL (ref 0–1.2)
BUN SERPL-MCNC: 13 MG/DL (ref 6–24)
BUN/CREAT SERPL: 23 (ref 9–23)
CALCIUM SERPL-MCNC: 9.5 MG/DL (ref 8.7–10.2)
CHLORIDE SERPL-SCNC: 107 MMOL/L (ref 96–106)
CHOLEST SERPL-MCNC: 139 MG/DL (ref 100–199)
CO2 SERPL-SCNC: 23 MMOL/L (ref 20–29)
CREAT SERPL-MCNC: 0.57 MG/DL (ref 0.57–1)
GLOBULIN SER CALC-MCNC: 2.4 G/DL (ref 1.5–4.5)
GLUCOSE SERPL-MCNC: 98 MG/DL (ref 65–99)
HCV AB S/CO SERPL IA: <0.1 S/CO RATIO (ref 0–0.9)
HDLC SERPL-MCNC: 60 MG/DL
IMP & REVIEW OF LAB RESULTS: NORMAL
LDLC SERPL CALC-MCNC: 63 MG/DL (ref 0–99)
POTASSIUM SERPL-SCNC: 4.5 MMOL/L (ref 3.5–5.2)
PROT SERPL-MCNC: 7 G/DL (ref 6–8.5)
SODIUM SERPL-SCNC: 146 MMOL/L (ref 134–144)
TRIGL SERPL-MCNC: 82 MG/DL (ref 0–149)
VLDLC SERPL CALC-MCNC: 16 MG/DL (ref 5–40)

## 2021-03-14 NOTE — PROGRESS NOTES
Labs look GREAT!! Cholesterol numbers look wonderful!     Hepatitis C screening    Liver and kidney function normal.

## 2021-03-30 ENCOUNTER — TELEPHONE (OUTPATIENT)
Dept: FAMILY MEDICINE CLINIC | Age: 56
End: 2021-03-30

## 2021-03-30 NOTE — TELEPHONE ENCOUNTER
----- Message from Pako Kelly sent at 3/30/2021 11:15 AM EDT -----  Regarding: NP [place] / Telephone  General Message/Vendor Calls    Caller's first and last name: N/A      Reason for call: Pt informed that she is scheduled for her Pneumonia shot on 4/6/2021 and her second COVID vaccine shot on 4/8/2021. Pt is inquiring if this will be okay or if she should r/s her pneumonia shot. Callback required yes/no and why: yes, to give guidance either way.       Best contact number(s): [phone]      Details to clarify the request: N/A      Marlatariq Benitezy

## 2021-03-30 NOTE — TELEPHONE ENCOUNTER
Verified patient with two type of identifiers. Discussed with pt per CDC to wait 14 days in between COVID vaccine and others. Pt ok to come in next week for just BP and weight check.

## 2021-04-05 ENCOUNTER — OFFICE VISIT (OUTPATIENT)
Dept: OBGYN CLINIC | Age: 56
End: 2021-04-05
Payer: COMMERCIAL

## 2021-04-05 VITALS
WEIGHT: 127.38 LBS | HEIGHT: 59 IN | TEMPERATURE: 98 F | BODY MASS INDEX: 25.68 KG/M2 | SYSTOLIC BLOOD PRESSURE: 124 MMHG | DIASTOLIC BLOOD PRESSURE: 70 MMHG

## 2021-04-05 DIAGNOSIS — Z00.00 ANNUAL VISIT FOR GENERAL ADULT MEDICAL EXAMINATION WITHOUT ABNORMAL FINDINGS: ICD-10-CM

## 2021-04-05 DIAGNOSIS — Z11.51 SCREENING FOR HUMAN PAPILLOMAVIRUS: ICD-10-CM

## 2021-04-05 DIAGNOSIS — Z01.419 PAP SMEAR, AS PART OF ROUTINE GYNECOLOGICAL EXAMINATION: Primary | ICD-10-CM

## 2021-04-05 PROCEDURE — 99396 PREV VISIT EST AGE 40-64: CPT | Performed by: OBSTETRICS & GYNECOLOGY

## 2021-04-05 RX ORDER — ALPRAZOLAM 1 MG/1
TABLET ORAL
COMMUNITY
Start: 2021-03-22 | End: 2021-09-20

## 2021-04-05 RX ORDER — OMEPRAZOLE 40 MG/1
CAPSULE, DELAYED RELEASE ORAL
COMMUNITY
Start: 2021-04-02

## 2021-04-05 NOTE — PROGRESS NOTES
Stuart Richey is a 48 y.o. female who presents today for the following:  Chief Complaint   Patient presents with    Annual Exam        Allergies   Allergen Reactions    Flomax [Tamsulosin] Other (comments)     Severe headache    Percocet [Oxycodone-Acetaminophen] Nausea and Vomiting and Other (comments)     dizziness    Lipitor [Atorvastatin] Other (comments)     Dizzy and nausea    Zetia [Ezetimibe] Other (comments)     headaches    Gabapentin Nausea and Vomiting     dizziness    Naproxen Nausea and Vomiting    Prednisone Nausea and Vomiting       Current Outpatient Medications   Medication Sig    ALPRAZolam (XANAX) 1 mg tablet TAKE ONE TABLET BY MOUTH TWO HOURS PRIOR TO APPOINTMENT    omeprazole (PRILOSEC) 40 mg capsule TAKE ONE CAPSULE BY MOUTH EVERY MORNING    denosumab (Prolia) 60 mg/mL injection 60 mg by SubCUTAneous route every 6 months.  evolocumab (Repatha Syringe) syringe INJECT 1 SYRINGE UNDER THE SKIN EVERY OTHER WEEK.  esomeprazole (NexIUM) 20 mg capsule Take  by mouth daily.  amLODIPine (NORVASC) 5 mg tablet TAKE ONE TABLET BY MOUTH ONE TIME DAILY    Blood Pressure Monitor kit Please provide with blood pressure monitor, trends elevated in office thinking it is white coat HTN, want to confirm    cholecalciferol (VITAMIN D3) (1000 Units /25 mcg) tablet Take  by mouth daily.  methotrexate (RHEUMATREX) 2.5 mg tablet Take 8 Tabs by mouth every Sunday.  folic acid (FOLVITE) 1 mg tablet Take 1 Tab by mouth daily.  rosuvastatin (CRESTOR) 40 mg tablet TAKE ONE TABLET BY MOUTH AT BEDTIME     No current facility-administered medications for this visit.         Past Medical History:   Diagnosis Date    Aortic valvar stenosis 2/23/2015    Carotid stenosis, bilateral     CMT (Charcot Louise Tooth) disease     Essential hypertension, benign 12/10/2010    High risk for fracture due to osteoporosis by DEXA scan 2/23/2015    Hypercholesteremia 3/15/2010    [facility] infarction ([facility] [address]  Osteopenia     RA (rheumatoid arthritis) ([facility] [address] 3/15/2010       Past Surgical History:   Procedure Laterality Date    HX GYN      BTL    MN LAP,TUBAL CAUTERY      MN RENAL SCOPE,REMV FB/STONE         Family History   Problem Relation Age of Onset    Thyroid Disease Mother     Elevated Lipids Mother     Heart Disease Mother     Stroke Mother 48    Cancer Maternal Aunt         breast    Breast Cancer Maternal Aunt         over 48    Cancer Maternal Grandmother         throat/was snuff user    Heart Disease Daughter        Social History     Socioeconomic History    Marital status:      Spouse name: Not on file    Number of children: Not on file    Years of education: Not on file    Highest education level: Not on file   Occupational History    Not on file   Social Needs    Financial resource strain: Not on file    Food insecurity     Worry: Not on file     Inability: Not on file    Transportation needs     Medical: Not on file     Non-medical: Not on file   Tobacco Use    Smoking status: Never Smoker    Smokeless tobacco: Never Used   Substance and Sexual Activity    Alcohol use: No     Alcohol/week: 0.0 standard drinks    Drug use: No    Sexual activity: Not Currently   Lifestyle    Physical activity     Days per week: Not on file     Minutes per session: Not on file    Stress: Not on file   Relationships    Social connections     Talks on phone: Not on file     Gets together: Not on file     Attends Sabianism service: Not on file     Active member of club or organization: Not on file     Attends meetings of clubs or organizations: Not on file     Relationship status: Not on file    Intimate partner violence     Fear of current or ex partner: Not on file     Emotionally abused: Not on file     Physically abused: Not on file     Forced sexual activity: Not on file   Other Topics Concern    Not on file   Social History Narrative    Not on file         ROS   Review of Systems Constitutional: Negative. HENT: Negative. Eyes: Negative. Respiratory: Negative. Cardiovascular: Negative. Gastrointestinal: Negative. Genitourinary: Negative. Musculoskeletal: Negative. Skin: Negative. Neurological: Negative. Endo/Heme/Allergies: Negative. Psychiatric/Behavioral: Negative. /70   Temp 98 °F (36.7 °C)   Ht 4' 11\" (1.499 m)   Wt 127 lb 6 oz (57.8 kg)   BMI 25.73 kg/m²    OBGyn Exam   Constitutional  · Appearance: well-nourished, well developed, alert, in no acute distress    HENT  · Head and Face: appears normal    Neck  · Inspection/Palpation: normal appearance, no masses or tenderness  · Lymph Nodes: no lymphadenopathy present  · Thyroid: gland size normal, nontender, no nodules or masses present on palpation    Breasts   Symmetric, no palpable masses, no tenderness, no skin changes, no nipple abnormality, no nipple discharge, no lymphadenopathy.     Chest  · Respiratory Effort: breathing labored  · Auscultation: normal breath sounds    Cardiovascular  · Heart:  · Auscultation: regular rate and rhythm without murmur    Gastrointestinal  · Abdominal Examination: abdomen non-tender to palpation, normal bowel sounds, no masses present  · Liver and spleen: no hepatomegaly present, spleen not palpable  · Hernias: no hernias identified    Genitourinary  · External Genitalia: normal appearance for age, no discharge present, no tenderness present, no inflammatory lesions present, no masses present, no atrophy present  · Vagina: normal vaginal vault without central or paravaginal defects, no discharge present, no inflammatory lesions present, no masses present  · Bladder: STAGE 2 CYSTOCELE  · Urethra: appears normal  · Cervix: normal   · Uterus: normal size, shape and consistency  · Adnexa: no adnexal tenderness present, no adnexal masses present  · Perineum: perineum within normal limits, no evidence of trauma, no rashes or skin lesions present  · Anus: anus within normal limits, no hemorrhoids present  · Inguinal Lymph Nodes: no lymphadenopathy present    Skin  · General Inspection: no rash, no lesions identified    Neurologic/Psychiatric  · Mental Status:  · Orientation: grossly oriented to person, place and time  · Mood and Affect: mood normal, affect appropriate    No results found for this visit on 04/05/21. Orders Placed This Encounter    ALPRAZolam (XANAX) 1 mg tablet     Sig: TAKE ONE TABLET BY MOUTH TWO HOURS PRIOR TO APPOINTMENT    omeprazole (PRILOSEC) 40 mg capsule     Sig: TAKE ONE CAPSULE BY MOUTH EVERY MORNING    PAP IG, RFX APTIMA HPV ASCUS ([id number])     Order Specific Question:   Pap Source? Answer:   Cervical     Order Specific Question:   Total Hysterectomy? Answer:   No     Order Specific Question:   Supracervical Hysterectomy? Answer:   No     Order Specific Question:   Post Menopausal?     Answer:   Yes     Order Specific Question:   Hormone Therapy? Answer:   No     Order Specific Question:   IUD? Answer:   No     Order Specific Question:   Abnormal Bleeding? Answer:   No     Order Specific Question:   Pregnant     Answer:   No     Order Specific Question:   Post Partum? Answer:   No     49 yo ANNUAL  SMALL CYSTOCELE, ASYMPTOMATIC    1. Pap smear, as part of routine gynecological examination    - PAP IG, RFX APTIMA HPV ASCUS ([id number])    2. Screening for human papillomavirus    - PAP IG, RFX APTIMA HPV ASCUS ([id number])    3.  Annual visit for general adult medical examination without abnormal findings

## 2021-04-06 ENCOUNTER — CLINICAL SUPPORT (OUTPATIENT)
Dept: FAMILY MEDICINE CLINIC | Age: 56
End: 2021-04-06

## 2021-04-06 VITALS
DIASTOLIC BLOOD PRESSURE: 66 MMHG | WEIGHT: 127.8 LBS | HEIGHT: 59 IN | HEART RATE: 74 BPM | BODY MASS INDEX: 25.76 KG/M2 | TEMPERATURE: 97.7 F | SYSTOLIC BLOOD PRESSURE: 164 MMHG

## 2021-04-06 DIAGNOSIS — I10 ESSENTIAL HYPERTENSION, BENIGN: Primary | ICD-10-CM

## 2021-04-06 NOTE — PROGRESS NOTES
Storm Hernandez (: 1965) is a 48 y.o. female, established patient, here for evaluation of the following chief complaint(s):  NURSE VISIT  Blood Pressure Check     Vitals:    21 0753   BP: (!) 164/66   Pulse: 74   Temp: 97.7 °F (36.5 °C)   TempSrc: Skin   Weight: 127 lb 12.8 oz (58 kg)   Height: 4' 11\" (1.499 m)     ASSESSMENT/PLAN:  1. Essential hypertension, benign   -  Patient did not take BP medication today. BP was controlled yesterday at GYN office 124/70. Will continue to monitor. An electronic signature was used to authenticate this note.   -- Kimmy Sánchez NP

## 2021-04-06 NOTE — PROGRESS NOTES
Chief Complaint   Patient presents with    Blood Pressure Check       Pt states has not taken BP medication just yet.  Pt states BP at OBGYN office yesterday normal.

## 2021-04-07 LAB
CYTOLOGIST CVX/VAG CYTO: NORMAL
CYTOLOGY CVX/VAG DOC CYTO: NORMAL
CYTOLOGY CVX/VAG DOC THIN PREP: NORMAL
DX ICD CODE: NORMAL
LABCORP, 190119: NORMAL
Lab: NORMAL
OTHER STN SPEC: NORMAL
STAT OF ADQ CVX/VAG CYTO-IMP: NORMAL

## 2021-06-16 DIAGNOSIS — E78.5 DYSLIPIDEMIA, GOAL LDL BELOW 100: Chronic | ICD-10-CM

## 2021-06-16 RX ORDER — ROSUVASTATIN CALCIUM 40 MG/1
TABLET, COATED ORAL
Qty: 30 TABLET | Refills: 11 | Status: SHIPPED | OUTPATIENT
Start: 2021-06-16 | End: 2022-05-03 | Stop reason: SDUPTHER

## 2021-07-27 ENCOUNTER — PATIENT MESSAGE (OUTPATIENT)
Dept: FAMILY MEDICINE CLINIC | Age: 56
End: 2021-07-27
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
## 2021-07-28 RX ORDER — AMOXICILLIN AND CLAVULANATE POTASSIUM 875; 125 MG/1; MG/1
1 TABLET, FILM COATED ORAL 2 TIMES DAILY
Qty: 20 TABLET | Refills: 0 | Status: SHIPPED | OUTPATIENT
Start: 2021-07-28 | End: 2021-08-07

## 2021-09-20 ENCOUNTER — VIRTUAL VISIT (OUTPATIENT)
Dept: FAMILY MEDICINE CLINIC | Age: 56
End: 2021-09-20
Payer: COMMERCIAL

## 2021-09-20 DIAGNOSIS — M81.0 OSTEOPOROSIS, UNSPECIFIED OSTEOPOROSIS TYPE, UNSPECIFIED PATHOLOGICAL FRACTURE PRESENCE: ICD-10-CM

## 2021-09-20 DIAGNOSIS — G60.0 CMT (CHARCOT-MARIE-TOOTH DISEASE): ICD-10-CM

## 2021-09-20 DIAGNOSIS — M06.9 RHEUMATOID ARTHRITIS INVOLVING MULTIPLE SITES, UNSPECIFIED WHETHER RHEUMATOID FACTOR PRESENT (HCC): ICD-10-CM

## 2021-09-20 DIAGNOSIS — E55.9 VITAMIN D DEFICIENCY: ICD-10-CM

## 2021-09-20 DIAGNOSIS — I10 ESSENTIAL HYPERTENSION, BENIGN: ICD-10-CM

## 2021-09-20 DIAGNOSIS — B35.1 ONYCHOMYCOSIS: Primary | ICD-10-CM

## 2021-09-20 DIAGNOSIS — E78.5 DYSLIPIDEMIA, GOAL LDL BELOW 100: ICD-10-CM

## 2021-09-20 DIAGNOSIS — I65.23 CAROTID STENOSIS, BILATERAL: ICD-10-CM

## 2021-09-20 PROCEDURE — 99214 OFFICE O/P EST MOD 30 MIN: CPT | Performed by: NURSE PRACTITIONER

## 2021-09-20 RX ORDER — METHOTREXATE 25 MG/ML
INJECTION INTRA-ARTERIAL; INTRAMUSCULAR; INTRATHECAL; INTRAVENOUS
COMMUNITY
Start: 2021-08-11 | End: 2022-10-07

## 2021-09-20 NOTE — PROGRESS NOTES
Bettye Santos (: 1965) is a 48 y.o. female, established patient, here for evaluation of the following chief complaint(s):   Follow-up (toenail problem, referral to podiatry )       ASSESSMENT/PLAN:  Below is the assessment and plan developed based on review of pertinent history, labs, studies, and medications. 1. Onychomycosis - patient to schedule with new podiatrist  2. Essential hypertension, benign  -     CBC WITH AUTOMATED DIFF; Future  -     METABOLIC PANEL, COMPREHENSIVE; Future  3. Dyslipidemia, goal LDL below 100  -     LIPID PANEL; Future  4. Osteoporosis, unspecified osteoporosis type, unspecified pathological fracture presence - patient cannot afford Prolia - states they told her it would cost $4000 for injection. Will check with [facility]. If cost is accurate, would be interested in once yearly infusion of Reclast  5. Rheumatoid arthritis involving multiple sites, unspecified whether rheumatoid factor present ([facility] [address] - followed by rheumatology, taking MTX injections  6. CMT (Charcot-Louise-Tooth disease)  7. Carotid stenosis, bilateral - will repeat carotid dopplers after COVID pandemic. She is on statin  8. Vitamin D deficiency  -     VITAMIN D, 25 HYDROXY; Future      Return in about 6 months (around 3/20/2022). SUBJECTIVE/OBJECTIVE:  HPI  Patient is seen virtually today for follow up lipids    Was seeing Dr. Flash Arroyo (podiatry), office has been bought out by another company and they seem to have suspicious billing practices and prices have increased. Would like another podiatrist to address fungal nail infection. She got COVID vaccine, completed 21, received Vitrinepix. She did flu and tetanus shot done at [facility]. Seeing Kulwant Lazo for RA, doing MTX injections, just started 1 week ago. Hoping this works better than oral treatment. 3/23/21 - was to have had colonoscopy.  Need to obtain report     HTN - Taking amlodipine.  Tolerating medication.  Denies chest pains, palpitations, dyspnea.  needs order sent for BP cuff again.     Doing Repatha for cholesterol.  Last LDL 63 in March 2021, unchanged from 58  in Sept 2020, down from 70 in Jan 2020. Her LDL prior to [address] highest reading 383. Needs FLP done. Will schedule nurse visit    Had Carotid doppler in 2017:  1. Bilateral <50% stenosis of the internal carotid arteries. 2. No significant stenosis in the external carotid arteries  bilaterally. 3. Antegrade flow in both vertebral arteries. 4. Normal flow in both subclavian arteries. Plaque Morphology:  1. Heterogeneous plaque in the bulb and right ICA. 2. Heterogeneous plaque in the bulb and left ICA. Will repeat after COVID pandemic     Hx osteoporosis - had Prolia injection on 2/15/21. DEXA on 2/4/21 - IMPRESSION  This patient is osteoporotic using the World Health Organization criteria. As compared to the prior study, there has been a trend toward an interval decrease in the left femoral neck. Did not have recent Prolia injection because they told her it would cost $4000 for her part     She had CTA abd in 2016 - negative for AAA.  US in 2014 negative for AAA     Had CXR in Aug 2020, poss pulmonary nodule.  had CT chest on 9/8/20 - no pulmonary nodule noted.     Allergies   Allergen Reactions    Flomax [Tamsulosin] Other (comments)     Severe headache    Percocet [Oxycodone-Acetaminophen] Nausea and Vomiting and Other (comments)     dizziness    Lipitor [Atorvastatin] Other (comments)     Dizzy and nausea    Zetia [Ezetimibe] Other (comments)     headaches    Gabapentin Nausea and Vomiting     dizziness    Naproxen Nausea and Vomiting    Prednisone Nausea and Vomiting       Past Medical History:   Diagnosis Date    Aortic valvar stenosis 2/23/2015    Carotid stenosis, bilateral     CMT (Charcot Louise Tooth) disease     Essential hypertension, benign 12/10/2010    High risk for fracture due to osteoporosis by DEXA scan 2/23/2015    Hypercholesteremia 3/15/2010    Myocardial infarction ([facility] [address]     Osteopenia     RA (rheumatoid arthritis) ([facility] [address] 3/15/2010       Past Surgical History:   Procedure Laterality Date    HX GYN      BTL    NE LAP,TUBAL CAUTERY      NE RENAL SCOPE,REMV FB/STONE         Social History     Socioeconomic History    Marital status:      Spouse name: Not on file    Number of children: Not on file    Years of education: Not on file    Highest education level: Not on file   Occupational History    Not on file   Tobacco Use    Smoking status: Never Smoker    Smokeless tobacco: Never Used   Vaping Use    Vaping Use: Never used   Substance and Sexual Activity    Alcohol use: No     Alcohol/week: 0.0 standard drinks    Drug use: No    Sexual activity: Not Currently   Other Topics Concern    Not on file   Social History Narrative    Not on file     Social Determinants of Health     Financial Resource Strain:     Difficulty of Paying Living Expenses:    Food Insecurity:     Worried About Running Out of Food in the Last Year:     Ran Out of Food in the Last Year:    Transportation Needs:     Lack of Transportation (Medical):      Lack of Transportation (Non-Medical):    Physical Activity:     Days of Exercise per Week:     Minutes of Exercise per Session:    Stress:     Feeling of Stress :    Social Connections:     Frequency of Communication with Friends and Family:     Frequency of Social Gatherings with Friends and Family:     Attends Yaz[employer] Services:     Active Member of Clubs or Organizations:     Attends Club or Organization Meetings:     Marital Status:    Intimate Partner Violence:     Fear of Current or Ex-Partner:     Emotionally Abused:     Physically Abused:     Sexually Abused:        Family History   Problem Relation Age of Onset    Thyroid Disease Mother     Elevated Lipids Mother     Heart Disease Mother     Stroke Mother 48    Cancer Maternal Aunt         breast    Breast Cancer Maternal Aunt over 48    Cancer Maternal Grandmother         throat/was snuff user    Heart Disease Daughter        Current Outpatient Medications   Medication Sig    methotrexate, PF, 25 mg/mL injection every seven (7) days.  rosuvastatin (CRESTOR) 40 mg tablet TAKE ONE TABLET BY MOUTH AT BEDTIME    omeprazole (PRILOSEC) 40 mg capsule TAKE ONE CAPSULE BY MOUTH EVERY MORNING    evolocumab (Repatha Syringe) syringe INJECT 1 SYRINGE UNDER THE SKIN EVERY OTHER WEEK.  esomeprazole (NexIUM) 20 mg capsule Take  by mouth daily.  amLODIPine (NORVASC) 5 mg tablet TAKE ONE TABLET BY MOUTH ONE TIME DAILY    Blood Pressure Monitor kit Please provide with blood pressure monitor, trends elevated in office thinking it is white coat HTN, want to confirm    cholecalciferol (VITAMIN D3) (1000 Units /25 mcg) tablet Take  by mouth daily.  folic acid (FOLVITE) 1 mg tablet Take 1 Tab by mouth daily. No current facility-administered medications for this visit. Review of Systems   Constitutional: Negative for chills, diaphoresis, fatigue, fever and unexpected weight change. Respiratory: Negative for cough and shortness of breath. Cardiovascular: Negative for chest pain, palpitations and leg swelling. Gastrointestinal: Negative for abdominal pain, nausea and vomiting. Genitourinary: Negative for dysuria, flank pain, frequency, hematuria and urgency. Musculoskeletal: Negative for myalgias. Skin:        Fungal nail infection   Neurological: Negative for dizziness, speech difficulty, light-headedness, numbness and headaches. Psychiatric/Behavioral: Negative for dysphoric mood and sleep disturbance. The patient is not nervous/anxious.        No data recorded     Physical Exam  Constitutional: [x] Appears well-developed and well-nourished [x] No apparent distress      Mental status: [x] Alert and awake  [x] Oriented to person/place/time [x] Able to follow commands      Neck: [x] No visualized mass    Pulmonary/Chest: [x] Respiratory effort normal   [x] No visualized signs of difficulty breathing or respiratory distresS     Musculoskeletal:   [x] Normal range of motion of neck    Neurological:        [x] No Facial Asymmetry (Cranial nerve 7 motor function) (limited exam due to video visit)           Skin:        [x] No significant exanthematous lesions or discoloration noted on facial skin            Psychiatric:       [x] Normal Affect       [x] No Hallucinations    On this date 09/20/2021 I have spent 32 minutes reviewing previous notes, test results and face to face (virtual) with the patient discussing the diagnosis and importance of compliance with the treatment plan as well as documenting on the day of the visit. Maryana Michelle, was evaluated through a synchronous (real-time) audio-video encounter. The patient (or guardian if applicable) is aware that this is a billable service. Verbal consent to proceed has been obtained within the past 12 months. The visit was conducted pursuant to the emergency declaration under the [address] [place] [address], [address] [place] [address] authority and the [employer] and [employer] Act. Patient identification was verified, and a caregiver was present when appropriate. The patient was located in a state where the provider was credentialed to provide care. An electronic signature was used to authenticate this note.   -- Leslie Mccollum NP

## 2021-09-20 NOTE — Clinical Note
Can you check with [facility] aout Prolia - they told her last injection would cost $4000. If accurate - do they do the Reclast infusions? Pull immunizations from [address], had COVID vaccine  Schedule lab only - orders on this visit  Had colonoscopy in March - did not receive report. Can you call. Reorder BP cuff, states when they called her, she didn't know who ordered. Gavino Brittle ? ? ? Lol  Sorry. ...but thanks!

## 2021-09-20 NOTE — PROGRESS NOTES
Chief Complaint   Patient presents with    Follow-up     toenail problem, referral to podiatry        1. Have you been to the ER, urgent care clinic since your last visit? Hospitalized since your last visit? No    2. Have you seen or consulted any other health care providers outside of the [address] [place] [address] since your last visit? Include any pap smears or colon screening.  No    Discuss toenail problem - pt was unable to see Dr Grecia Caban, requesting referral to another provider

## 2021-09-30 ENCOUNTER — DOCUMENTATION ONLY (OUTPATIENT)
Dept: FAMILY MEDICINE CLINIC | Age: 56
End: 2021-09-30

## 2021-10-01 ENCOUNTER — CLINICAL SUPPORT (OUTPATIENT)
Dept: FAMILY MEDICINE CLINIC | Age: 56
End: 2021-10-01

## 2021-10-01 DIAGNOSIS — Z01.89 ROUTINE LAB DRAW: Primary | ICD-10-CM

## 2021-10-01 NOTE — PROGRESS NOTES
Jennifer Sethi (: 1965) is a 48 y.o. female, established patient, here for evaluation of the following chief complaint(s):  Labs Only      [facility] 21 ENCOUNTER    An electronic signature was used to authenticate this note.   -- Bren Pedraza NP

## 2021-10-02 LAB
25(OH)D3+25(OH)D2 SERPL-MCNC: 39.7 NG/ML (ref 30–100)
ALBUMIN SERPL-MCNC: 4.4 G/DL (ref 3.8–4.9)
ALBUMIN/GLOB SERPL: 2 {RATIO} (ref 1.2–2.2)
ALP SERPL-CCNC: 78 IU/L (ref 44–121)
ALT SERPL-CCNC: 15 IU/L (ref 0–32)
AST SERPL-CCNC: 15 IU/L (ref 0–40)
BASOPHILS # BLD AUTO: 0.1 X10E3/UL (ref 0–0.2)
BASOPHILS NFR BLD AUTO: 1 %
BILIRUB SERPL-MCNC: 0.3 MG/DL (ref 0–1.2)
BUN SERPL-MCNC: 11 MG/DL (ref 6–24)
BUN/CREAT SERPL: 18 (ref 9–23)
CALCIUM SERPL-MCNC: 9.2 MG/DL (ref 8.7–10.2)
CHLORIDE SERPL-SCNC: 109 MMOL/L (ref 96–106)
CHOLEST SERPL-MCNC: 144 MG/DL (ref 100–199)
CO2 SERPL-SCNC: 25 MMOL/L (ref 20–29)
CREAT SERPL-MCNC: 0.62 MG/DL (ref 0.57–1)
EOSINOPHIL # BLD AUTO: 0.2 X10E3/UL (ref 0–0.4)
EOSINOPHIL NFR BLD AUTO: 2 %
ERYTHROCYTE [DISTWIDTH] IN BLOOD BY AUTOMATED COUNT: 12.8 % (ref 11.7–15.4)
GLOBULIN SER CALC-MCNC: 2.2 G/DL (ref 1.5–4.5)
GLUCOSE SERPL-MCNC: 99 MG/DL (ref 65–99)
HCT VFR BLD AUTO: 40.4 % (ref 34–46.6)
HDLC SERPL-MCNC: 54 MG/DL
HGB BLD-MCNC: 13.4 G/DL (ref 11.1–15.9)
IMM GRANULOCYTES # BLD AUTO: 0 X10E3/UL (ref 0–0.1)
IMM GRANULOCYTES NFR BLD AUTO: 0 %
IMP & REVIEW OF LAB RESULTS: NORMAL
LDLC SERPL CALC-MCNC: 76 MG/DL (ref 0–99)
LYMPHOCYTES # BLD AUTO: 1.9 X10E3/UL (ref 0.7–3.1)
LYMPHOCYTES NFR BLD AUTO: 29 %
MCH RBC QN AUTO: 30.6 PG (ref 26.6–33)
MCHC RBC AUTO-ENTMCNC: 33.2 G/DL (ref 31.5–35.7)
MCV RBC AUTO: 92 FL (ref 79–97)
MONOCYTES # BLD AUTO: 0.5 X10E3/UL (ref 0.1–0.9)
MONOCYTES NFR BLD AUTO: 8 %
NEUTROPHILS # BLD AUTO: 3.9 X10E3/UL (ref 1.4–7)
NEUTROPHILS NFR BLD AUTO: 60 %
PLATELET # BLD AUTO: 267 X10E3/UL (ref 150–450)
POTASSIUM SERPL-SCNC: 4.1 MMOL/L (ref 3.5–5.2)
PROT SERPL-MCNC: 6.6 G/DL (ref 6–8.5)
RBC # BLD AUTO: 4.38 X10E6/UL (ref 3.77–5.28)
SODIUM SERPL-SCNC: 145 MMOL/L (ref 134–144)
TRIGL SERPL-MCNC: 72 MG/DL (ref 0–149)
VLDLC SERPL CALC-MCNC: 14 MG/DL (ref 5–40)
WBC # BLD AUTO: 6.5 X10E3/UL (ref 3.4–10.8)

## 2021-10-15 NOTE — PROGRESS NOTES
Received physicians order from [facility]. Once signed by Delphia Round, NP will fax back.

## 2021-11-17 ENCOUNTER — PATIENT MESSAGE (OUTPATIENT)
Dept: FAMILY MEDICINE CLINIC | Age: 56
End: 2021-11-17

## 2021-11-17 DIAGNOSIS — I10 ESSENTIAL HYPERTENSION, BENIGN: ICD-10-CM

## 2021-11-17 DIAGNOSIS — E78.5 DYSLIPIDEMIA, GOAL LDL BELOW 100: ICD-10-CM

## 2021-11-17 DIAGNOSIS — B35.1 ONYCHOMYCOSIS: Primary | ICD-10-CM

## 2021-11-17 RX ORDER — TERBINAFINE HYDROCHLORIDE 250 MG/1
250 TABLET ORAL DAILY
Qty: 30 TABLET | Refills: 0 | Status: SHIPPED | OUTPATIENT
Start: 2021-11-17 | End: 2022-01-03

## 2021-11-17 RX ORDER — EVOLOCUMAB 140 MG/ML
140 INJECTION, SOLUTION SUBCUTANEOUS
Qty: 6 ML | Refills: 3 | Status: SHIPPED | OUTPATIENT
Start: 2021-11-17

## 2021-11-17 RX ORDER — AMLODIPINE BESYLATE 5 MG/1
10 TABLET ORAL DAILY
Qty: 90 TABLET | Refills: 3
Start: 2021-11-17 | End: 2021-11-19 | Stop reason: DRUGHIGH

## 2021-11-18 DIAGNOSIS — I10 ESSENTIAL HYPERTENSION, BENIGN: ICD-10-CM

## 2021-11-18 NOTE — TELEPHONE ENCOUNTER
Good Taveras 11/17/2021 9:21 AM EST      ----- Message -----  From: Ena Castellanos  Sent: 11/17/2021 9:18 AM EST  To: [facility] Nurse Pool  Subject: Questions     When can I do my booster shot with the other vaccines I take weekly? my blood pressure rates are below, my arthritis doctor was concerned. 157/76 evening  124/78 morning  138/67 night  143/70 morning  153/71 night  118/78 morning    Can I take lamisal for my foot fungus? The concern was it effecting my liver count. Morelia Muñoz is recommending that I use the click pen instead of the injection due to pain and the plunger getting stuck while injecting.

## 2021-11-18 NOTE — TELEPHONE ENCOUNTER
I increased her amlodipine to 10mg yesterday,  I told her she could take 2 5mg until she runs out.   See how many 5mg she has left or if she needs me to go ahead and send 10mg

## 2021-11-19 RX ORDER — AMLODIPINE BESYLATE 5 MG/1
TABLET ORAL
Qty: 90 TABLET | Refills: 3 | OUTPATIENT
Start: 2021-11-19

## 2021-11-19 RX ORDER — AMLODIPINE BESYLATE 10 MG/1
10 TABLET ORAL DAILY
Qty: 90 TABLET | Refills: 3 | Status: SHIPPED | OUTPATIENT
Start: 2021-11-19

## 2021-11-29 ENCOUNTER — PATIENT MESSAGE (OUTPATIENT)
Dept: FAMILY MEDICINE CLINIC | Age: 56
End: 2021-11-29

## 2021-11-29 DIAGNOSIS — G62.9 NEUROPATHY: Primary | ICD-10-CM

## 2021-12-01 RX ORDER — AMITRIPTYLINE HYDROCHLORIDE 10 MG/1
10 TABLET, FILM COATED ORAL
Qty: 90 TABLET | Refills: 3 | Status: SHIPPED | OUTPATIENT
Start: 2021-12-01

## 2021-12-01 NOTE — TELEPHONE ENCOUNTER
Theo Dubose 12/1/2021 7:33 AM EST      ----- Message -----  From: Kennedy Hearn  Sent: 11/30/2021 6:23 PM EST  To: [facility] Nurse Pool  Subject: Feet Question     I will try the amitriptyline thank you

## 2022-01-02 DIAGNOSIS — B35.1 ONYCHOMYCOSIS: ICD-10-CM

## 2022-01-03 RX ORDER — TERBINAFINE HYDROCHLORIDE 250 MG/1
TABLET ORAL
Qty: 30 TABLET | Refills: 0 | Status: SHIPPED | OUTPATIENT
Start: 2022-01-03 | End: 2022-02-11

## 2022-01-06 ENCOUNTER — TELEPHONE (OUTPATIENT)
Dept: FAMILY MEDICINE CLINIC | Age: 57
End: 2022-01-06

## 2022-01-06 NOTE — TELEPHONE ENCOUNTER
I filled in on 1/3/22 for #30. Schedule her for hepatic function panel  We just checked lipids in October, does not need repeat lipid panel yet.

## 2022-01-06 NOTE — TELEPHONE ENCOUNTER
----- Message from [address] [place] [address]. Major Ely sent at 1/4/2022  8:26 PM EST -----  Regarding: Lab Request  Everything is good so far,my foot doctor says I need to be on this for three months to kill the toe fungis,could you please call to set the blood work up and also need blood work for rapathy shot  too. . thanks

## 2022-01-07 ENCOUNTER — CLINICAL SUPPORT (OUTPATIENT)
Dept: FAMILY MEDICINE CLINIC | Age: 57
End: 2022-01-07

## 2022-01-07 DIAGNOSIS — Z79.899 ENCOUNTER FOR LONG-TERM (CURRENT) USE OF HIGH-RISK MEDICATION: Primary | ICD-10-CM

## 2022-01-07 NOTE — PROGRESS NOTES
Carlos Diaz (: 1965) is a 55 y.o. female, established patient, here for evaluation of the following chief complaint(s):  Labs Only       ASSESSMENT/PLAN:  Below is the assessment and plan developed based on review of pertinent history, physical exam, labs, studies, and medications. 1. Encounter for long-term (current) use of high-risk medication  -     HEPATIC FUNCTION PANEL; Future      An electronic signature was used to authenticate this note.   -- Jimmy Brandon NP

## 2022-01-08 LAB
ALBUMIN SERPL-MCNC: 4.8 G/DL (ref 3.8–4.9)
ALP SERPL-CCNC: 92 IU/L (ref 44–121)
ALT SERPL-CCNC: 21 IU/L (ref 0–32)
AST SERPL-CCNC: 20 IU/L (ref 0–40)
BILIRUB DIRECT SERPL-MCNC: 0.11 MG/DL (ref 0–0.4)
BILIRUB SERPL-MCNC: 0.3 MG/DL (ref 0–1.2)
PROT SERPL-MCNC: 7.3 G/DL (ref 6–8.5)

## 2022-01-14 ENCOUNTER — OFFICE VISIT (OUTPATIENT)
Dept: CARDIOLOGY CLINIC | Age: 57
End: 2022-01-14
Payer: COMMERCIAL

## 2022-01-14 VITALS
WEIGHT: 126.8 LBS | DIASTOLIC BLOOD PRESSURE: 66 MMHG | SYSTOLIC BLOOD PRESSURE: 148 MMHG | BODY MASS INDEX: 25.56 KG/M2 | HEART RATE: 104 BPM | RESPIRATION RATE: 18 BRPM | OXYGEN SATURATION: 98 % | HEIGHT: 59 IN

## 2022-01-14 DIAGNOSIS — I35.0 AORTIC VALVE STENOSIS, ETIOLOGY OF CARDIAC VALVE DISEASE UNSPECIFIED: ICD-10-CM

## 2022-01-14 DIAGNOSIS — I10 ESSENTIAL HYPERTENSION, BENIGN: Primary | ICD-10-CM

## 2022-01-14 PROCEDURE — 99214 OFFICE O/P EST MOD 30 MIN: CPT | Performed by: INTERNAL MEDICINE

## 2022-01-14 PROCEDURE — 93000 ELECTROCARDIOGRAM COMPLETE: CPT | Performed by: INTERNAL MEDICINE

## 2022-01-14 RX ORDER — FAMOTIDINE 40 MG/1
40 TABLET, FILM COATED ORAL
COMMUNITY
Start: 2021-10-26

## 2022-01-14 NOTE — PROGRESS NOTES
1. Have you been to the ER, urgent care clinic since your last visit? Hospitalized since your last visit? No    2. Have you seen or consulted any other health care providers outside of the [address] [place] [address] since your last visit? Include any pap smears or colon screening.  No       Chief Complaint   Patient presents with    Hypertension     Pt denies cardiac symptoms

## 2022-01-14 NOTE — PROGRESS NOTES
Shyann Blackmon MD          NAME:  [place]    :   1965   MRN:   [id number]   PCP:  Gasper Solomon NP           Subjective: The patient is a 55y.o. year old female  who returns for a routine follow-up. Since the last visit, patient reports no change in exercise tolerance, chest pain, edema, medication intolerance, palpitations, shortness of breath, PND/orthopnea wheezing, sputum, syncope, dizziness or light headedness. Doing well. Past Medical History:   Diagnosis Date    Aortic valvar stenosis 2015    Carotid stenosis, bilateral     CMT (Charcot Louise Tooth) disease     Essential hypertension, benign 12/10/2010    High risk for fracture due to osteoporosis by DEXA scan 2015    Hypercholesteremia 3/15/2010    Murmur     Myocardial infarction ([facility] [address]     Osteopenia     RA (rheumatoid arthritis) ([facility] [address] 3/15/2010        ICD-10-CM ICD-9-CM    1. Essential hypertension, benign  I10 401.1 AMB POC EKG ROUTINE W/ 12 LEADS, INTER & REP   2. Aortic valve stenosis, etiology of cardiac valve disease unspecified  I35.0 424.1 ECHO ADULT COMPLETE      Social History     Tobacco Use    Smoking status: Never Smoker    Smokeless tobacco: Never Used   Substance Use Topics    Alcohol use: No     Alcohol/week: 0.0 standard drinks      Family History   Problem Relation Age of Onset    Thyroid Disease Mother     Elevated Lipids Mother     Heart Disease Mother     Stroke Mother 48    Cancer Maternal Aunt         breast    Breast Cancer Maternal Aunt         over 48    Cancer Maternal Grandmother         throat/was snuff user    Heart Disease Daughter         Review of Systems  Cardiovascular: Negative except as noted in HPI      Objective:       Vitals:    22 0847 22 0906   BP: (!) 154/66 (!) 148/66   Pulse: (!) 104    Resp: 18    SpO2: 98%    Weight: 126 lb 12.8 oz (57.5 kg)    Height: 4' 11\" (1.499 m)     Body mass index is 25.61 kg/m². General PE  Mental Status - Alert. General Appearance - Not in acute distress. Chest and Lung Exam   Inspection: Accessory muscles - No use of accessory muscles in breathing. Auscultation:   Breath sounds: - Normal.    Cardiovascular   Inspection: Jugular vein - Bilateral - Inspection Normal.  Palpation/Percussion:   Apical Impulse: - Normal.  Auscultation: Rhythm - Regular. Heart Sounds - S1 WNL and S2 WNL. No S3 or S4. Murmurs & Other Heart Sounds: Auscultation of the heart reveals - No Murmurs. Peripheral Vascular   Upper Extremity: Inspection - Bilateral - No Cyanotic nailbeds or Digital clubbing. Lower Extremity:   Palpation: Edema - Bilateral - No edema. Data Review:     EKG -  EKG: normal EKG, normal sinus rhythm, unchanged from previous tracings, sinus tachycardia. LABS- @brieflabs@      Allergies reviewed  Allergies   Allergen Reactions    Flomax [Tamsulosin] Other (comments)     Severe headache    Percocet [Oxycodone-Acetaminophen] Nausea and Vomiting and Other (comments)     dizziness    Lipitor [Atorvastatin] Other (comments)     Dizzy and nausea    Zetia [Ezetimibe] Other (comments)     headaches    Gabapentin Nausea and Vomiting     dizziness    Naproxen Nausea and Vomiting    Prednisone Nausea and Vomiting       Medications reviewed  Current Outpatient Medications   Medication Sig    famotidine (PEPCID) 40 mg tablet Take 40 mg by mouth nightly.  terbinafine HCL (LAMISIL) 250 mg tablet TAKE ONE TABLET BY MOUTH ONE TIME DAILY    amLODIPine (NORVASC) 10 mg tablet Take 1 Tablet by mouth daily.  evolocumab (Repatha SureClick) pen injection 1 mL by SubCUTAneous route every fourteen (14) days.  methotrexate, PF, 25 mg/mL injection every seven (7) days.     rosuvastatin (CRESTOR) 40 mg tablet TAKE ONE TABLET BY MOUTH AT BEDTIME    omeprazole (PRILOSEC) 40 mg capsule TAKE ONE CAPSULE BY MOUTH EVERY MORNING    Blood Pressure Monitor kit Please provide with blood pressure monitor, trends elevated in office thinking it is white coat HTN, want to confirm    cholecalciferol (VITAMIN D3) (1000 Units /25 mcg) tablet Take  by mouth daily.  folic acid (FOLVITE) 1 mg tablet Take 1 Tab by mouth daily.  amitriptyline (ELAVIL) 10 mg tablet Take 1 Tablet by mouth nightly. (Patient not taking: Reported on 1/14/2022)    esomeprazole (NexIUM) 20 mg capsule Take  by mouth daily. (Patient not taking: Reported on 1/14/2022)     No current facility-administered medications for this visit. Assessment:       ICD-10-CM ICD-9-CM    1. Essential hypertension, benign  I10 401.1 AMB POC EKG ROUTINE W/ 12 LEADS, INTER & REP   2. Aortic valve stenosis, etiology of cardiac valve disease unspecified  I35.0 424.1 ECHO ADULT COMPLETE        Orders Placed This Encounter    AMB POC EKG ROUTINE W/ 12 LEADS, INTER & REP     Order Specific Question:   Reason for Exam:     Answer:   routine    famotidine (PEPCID) 40 mg tablet     Sig: Take 40 mg by mouth nightly. Plan:     BP readings fine at home, 120's/70's. LDL at target on repatha. Echo to assess heart murmur.     Charito Gilmore MD

## 2022-01-14 NOTE — LETTER
1/14/2022    Patient: Miguel Barragan   YOB: 1965   Date of Visit: 1/14/2022     Max Koyanagi, NP  [address] [place] [address]  Via In Basket    Dear Max Koyanagi, NP,      Thank you for referring Ms. Arlet Greenberg to [facility] for evaluation. My notes for this consultation are attached. If you have questions, please do not hesitate to call me. I look forward to following your patient along with you.       Sincerely,    Onalee Cranker, MD

## 2022-01-25 ENCOUNTER — PATIENT MESSAGE (OUTPATIENT)
Dept: FAMILY MEDICINE CLINIC | Age: 57
End: 2022-01-25

## 2022-01-25 DIAGNOSIS — Z12.31 ENCOUNTER FOR SCREENING MAMMOGRAM FOR MALIGNANT NEOPLASM OF BREAST: Primary | ICD-10-CM

## 2022-01-26 NOTE — TELEPHONE ENCOUNTER
Orders Placed This Encounter    ABDULLAHI 3D SANTIAGO W MAMMO BI DX INCL CAD     Standing Status:   Future     Standing Expiration Date:   2/26/2023

## 2022-01-26 NOTE — TELEPHONE ENCOUNTER
From: Jorge Alberto Grimes  To: Samara Fine NP  Sent: 1/25/2022 10:58 AM EST  Subject: Mammogram     Hello,I am due for my mammogram at [facility] so I need someone to fax over a referral or something due to that effect so I can get same test that I had last year the 3-D test Thanks. Rupa Copeland

## 2022-01-27 ENCOUNTER — TRANSCRIBE ORDER (OUTPATIENT)
Dept: SCHEDULING | Age: 57
End: 2022-01-27

## 2022-01-27 DIAGNOSIS — R92.8 ABNORMAL MAMMOGRAM: Primary | ICD-10-CM

## 2022-02-10 RX ORDER — EVOLOCUMAB 140 MG/ML
INJECTION, SOLUTION SUBCUTANEOUS
Qty: 6 ML | Refills: 5 | Status: SHIPPED | OUTPATIENT
Start: 2022-02-10 | End: 2022-04-04

## 2022-02-11 ENCOUNTER — ANCILLARY PROCEDURE (OUTPATIENT)
Dept: CARDIOLOGY CLINIC | Age: 57
End: 2022-02-11
Payer: COMMERCIAL

## 2022-02-11 VITALS
DIASTOLIC BLOOD PRESSURE: 66 MMHG | WEIGHT: 126 LBS | BODY MASS INDEX: 25.4 KG/M2 | HEIGHT: 59 IN | SYSTOLIC BLOOD PRESSURE: 148 MMHG

## 2022-02-11 DIAGNOSIS — I35.0 AORTIC VALVE STENOSIS, ETIOLOGY OF CARDIAC VALVE DISEASE UNSPECIFIED: ICD-10-CM

## 2022-02-11 DIAGNOSIS — B35.1 ONYCHOMYCOSIS: ICD-10-CM

## 2022-02-11 PROCEDURE — 93306 TTE W/DOPPLER COMPLETE: CPT | Performed by: INTERNAL MEDICINE

## 2022-02-11 RX ORDER — TERBINAFINE HYDROCHLORIDE 250 MG/1
TABLET ORAL
Qty: 30 TABLET | Refills: 1 | Status: SHIPPED | OUTPATIENT
Start: 2022-02-11 | End: 2022-04-01

## 2022-02-14 LAB
ECHO AO ASC DIAM: 2.2 CM
ECHO AO ASCENDING AORTA INDEX: 1.45 CM/M2
ECHO AO ROOT DIAM: 2.4 CM
ECHO AO ROOT INDEX: 1.58 CM/M2
ECHO AV AREA PEAK VELOCITY: 1.3 CM2
ECHO AV AREA VTI: 1.3 CM2
ECHO AV AREA/BSA PEAK VELOCITY: 0.9 CM2/M2
ECHO AV AREA/BSA VTI: 0.9 CM2/M2
ECHO AV MEAN GRADIENT: 14 MMHG
ECHO AV MEAN VELOCITY: 1.8 M/S
ECHO AV PEAK GRADIENT: 26 MMHG
ECHO AV PEAK VELOCITY: 2.5 M/S
ECHO AV VTI: 53.7 CM
ECHO LA DIAMETER INDEX: 1.51 CM/M2
ECHO LA DIAMETER: 2.3 CM
ECHO LA TO AORTIC ROOT RATIO: 0.96
ECHO LA VOL 2C: 47 ML (ref 22–52)
ECHO LA VOL 4C: 55 ML (ref 22–52)
ECHO LA VOLUME AREA LENGTH: 55 ML
ECHO LA VOLUME INDEX A2C: 31 ML/M2 (ref 16–34)
ECHO LA VOLUME INDEX A4C: 36 ML/M2 (ref 16–34)
ECHO LA VOLUME INDEX AREA LENGTH: 36 ML/M2 (ref 16–34)
ECHO LV E' LATERAL VELOCITY: 12 CM/S
ECHO LV E' SEPTAL VELOCITY: 9 CM/S
ECHO LV FRACTIONAL SHORTENING: 34 % (ref 28–44)
ECHO LV INTERNAL DIMENSION DIASTOLE INDEX: 2.89 CM/M2
ECHO LV INTERNAL DIMENSION DIASTOLIC: 4.4 CM (ref 3.9–5.3)
ECHO LV INTERNAL DIMENSION SYSTOLIC INDEX: 1.91 CM/M2
ECHO LV INTERNAL DIMENSION SYSTOLIC: 2.9 CM
ECHO LV IVSD: 0.9 CM (ref 0.6–0.9)
ECHO LV MASS 2D: 128 G (ref 67–162)
ECHO LV MASS INDEX 2D: 84.2 G/M2 (ref 43–95)
ECHO LV POSTERIOR WALL DIASTOLIC: 0.9 CM (ref 0.6–0.9)
ECHO LV RELATIVE WALL THICKNESS RATIO: 0.41
ECHO LVOT AREA: 3.1 CM2
ECHO LVOT AV VTI INDEX: 0.42
ECHO LVOT DIAM: 2 CM
ECHO LVOT MEAN GRADIENT: 3 MMHG
ECHO LVOT PEAK GRADIENT: 4 MMHG
ECHO LVOT PEAK GRADIENT: 4 MMHG
ECHO LVOT PEAK VELOCITY: 1 M/S
ECHO LVOT PEAK VELOCITY: 1 M/S
ECHO LVOT STROKE VOLUME INDEX: 46.3 ML/M2
ECHO LVOT SV: 70.3 ML
ECHO LVOT VTI: 22.4 CM
ECHO MV A VELOCITY: 1 M/S
ECHO MV AREA PHT: 4.8 CM2
ECHO MV E DECELERATION TIME (DT): 159.7 MS
ECHO MV E VELOCITY: 1 M/S
ECHO MV E/A RATIO: 1
ECHO MV E/E' LATERAL: 8.33
ECHO MV E/E' RATIO (AVERAGED): 9.72
ECHO MV E/E' SEPTAL: 11.11
ECHO MV PRESSURE HALF TIME (PHT): 46.3 MS
ECHO RV TAPSE: 2.5 CM (ref 1.5–2)

## 2022-02-15 ENCOUNTER — TELEPHONE (OUTPATIENT)
Dept: CARDIOLOGY CLINIC | Age: 57
End: 2022-02-15

## 2022-02-15 NOTE — TELEPHONE ENCOUNTER
Spoke with patient. Verified patient with two patient identifiers. Advised echo ok, EF good. AV mildly narrowed. Fu 1 year. Patient verbalized understanding.

## 2022-02-15 NOTE — TELEPHONE ENCOUNTER
----- Message from Jessica Verde MD sent at 2/15/2022  2:05 PM EST -----  Echo OK.   Aortic valve mildly blocked; fu one year

## 2022-02-16 RX ORDER — AMLODIPINE BESYLATE 5 MG/1
TABLET ORAL
Qty: 90 TABLET | Refills: 3 | OUTPATIENT
Start: 2022-02-16

## 2022-02-18 ENCOUNTER — APPOINTMENT (OUTPATIENT)
Dept: MAMMOGRAPHY | Age: 57
End: 2022-02-18
Payer: COMMERCIAL

## 2022-02-18 ENCOUNTER — HOSPITAL ENCOUNTER (OUTPATIENT)
Dept: MAMMOGRAPHY | Age: 57
Discharge: HOME | End: 2022-02-18
Payer: COMMERCIAL

## 2022-02-18 ENCOUNTER — TRANSCRIBE ORDER (OUTPATIENT)
Dept: GENERAL RADIOLOGY | Age: 57
End: 2022-02-18

## 2022-02-18 DIAGNOSIS — Z12.31 VISIT FOR SCREENING MAMMOGRAM: Primary | ICD-10-CM

## 2022-02-18 DIAGNOSIS — Z12.31 VISIT FOR SCREENING MAMMOGRAM: ICD-10-CM

## 2022-02-18 PROCEDURE — 77063 BREAST TOMOSYNTHESIS BI: CPT

## 2022-04-01 ENCOUNTER — OFFICE VISIT (OUTPATIENT)
Dept: FAMILY MEDICINE CLINIC | Age: 57
End: 2022-04-01
Payer: MEDICAID

## 2022-04-01 VITALS
RESPIRATION RATE: 14 BRPM | OXYGEN SATURATION: 99 % | WEIGHT: 126.8 LBS | DIASTOLIC BLOOD PRESSURE: 67 MMHG | SYSTOLIC BLOOD PRESSURE: 125 MMHG | TEMPERATURE: 98.1 F | HEIGHT: 59 IN | BODY MASS INDEX: 25.56 KG/M2 | HEART RATE: 107 BPM

## 2022-04-01 DIAGNOSIS — I10 ESSENTIAL HYPERTENSION, BENIGN: ICD-10-CM

## 2022-04-01 DIAGNOSIS — M06.9 RHEUMATOID ARTHRITIS INVOLVING MULTIPLE SITES, UNSPECIFIED WHETHER RHEUMATOID FACTOR PRESENT (HCC): ICD-10-CM

## 2022-04-01 DIAGNOSIS — M81.0 OSTEOPOROSIS, UNSPECIFIED OSTEOPOROSIS TYPE, UNSPECIFIED PATHOLOGICAL FRACTURE PRESENCE: ICD-10-CM

## 2022-04-01 DIAGNOSIS — F43.22 ADJUSTMENT DISORDER WITH ANXIOUS MOOD: ICD-10-CM

## 2022-04-01 DIAGNOSIS — G60.0 CMT (CHARCOT-MARIE-TOOTH DISEASE): ICD-10-CM

## 2022-04-01 DIAGNOSIS — E78.5 DYSLIPIDEMIA, GOAL LDL BELOW 100: Primary | ICD-10-CM

## 2022-04-01 PROCEDURE — 99213 OFFICE O/P EST LOW 20 MIN: CPT | Performed by: NURSE PRACTITIONER

## 2022-04-01 RX ORDER — HYDROXYZINE PAMOATE 25 MG/1
25 CAPSULE ORAL
Qty: 40 CAPSULE | Refills: 1 | Status: SHIPPED | OUTPATIENT
Start: 2022-04-01

## 2022-04-01 NOTE — PROGRESS NOTES
Nicolasa Samaniego (: 1965) is a 55 y.o. female, established patient, here for evaluation of the following chief complaint(s):  Complete Physical       ASSESSMENT/PLAN:  Below is the assessment and plan developed based on review of pertinent history, physical exam, labs, studies, and medications. 1. Dyslipidemia, goal LDL below 100  2. Essential hypertension, benign  3. Adjustment disorder with anxious mood  -     hydrOXYzine pamoate (VISTARIL) 25 mg capsule; Take 1 Capsule by mouth three (3) times daily as needed for Anxiety., Normal, Disp-40 Capsule, R-1  4. Osteoporosis, unspecified osteoporosis type, unspecified pathological fracture presence  5. Rheumatoid arthritis involving multiple sites, unspecified whether rheumatoid factor present ([facility]  6. CMT (Charcot-Louise-Tooth disease)    Patient to have labs done in May 2022 after insurance coverage is effective. Will need to resend for Prolia authorization at that time. Return in about 6 months (around 10/1/2022). SUBJECTIVE/OBJECTIVE:  HPI patient comes in today for CPE  Having to take care of 's grandmother. Her son is staying with her. She is POA. Daughter does bathing. She has dementia.  () is in rehab in [place].  got laid off  She will have Medicaid in May 2022, will wait until then to do labs. Would like to see if prolia is covered under medicaid. Not sure if specialist covered. She has been having some dizziness. Yesterday at home 130-140s.     Seeing Monserrat Ceballos for RA, doing MTX injections, just started 1 week ago. Hoping this works better than oral treatment.     HTN - Taking amlodipine.  Tolerating medication.  Denies chest pains, palpitations, dyspnea. Per cardiology - Echo OK. Aortic valve mildly blocked; fu one year     Was doing Repatha for cholesterol.  Last LDL 76 in Oct 2021, 63 in 2021, unchanged from 62  in 2020, down from 70 in 2020.   Her LDL prior to [address] highest reading 383.       Had Carotid doppler in 2017:  1. Bilateral <50% stenosis of the internal carotid arteries. 2. No significant stenosis in the external carotid arteries  bilaterally. 3. Antegrade flow in both vertebral arteries. 4. Normal flow in both subclavian arteries. Plaque Morphology:  1. Heterogeneous plaque in the bulb and right ICA. 2. Heterogeneous plaque in the bulb and left ICA. Will repeat after COVID pandemic     Hx osteoporosis - had Prolia injection on 2/15/21.  DEXA on 2/4/21 - IMPRESSION  This patient is osteoporotic using the [address] criteria.  As compared to the prior study, there has been a trend toward an interval decrease in the left femoral neck.  Hoping medicaid will cover for her     She had CTA abd in 2016 - negative for AAA.  US in 2014 negative for AAA     Had CXR in Aug 2020, poss pulmonary nodule.  had CT chest on 9/8/20 - no pulmonary nodule noted  Allergies   Allergen Reactions    Flomax [Tamsulosin] Other (comments)     Severe headache    Percocet [Oxycodone-Acetaminophen] Nausea and Vomiting and Other (comments)     dizziness    Lipitor [Atorvastatin] Other (comments)     Dizzy and nausea    Zetia [Ezetimibe] Other (comments)     headaches    Gabapentin Nausea and Vomiting     dizziness    Naproxen Nausea and Vomiting    Prednisone Nausea and Vomiting       Past Medical History:   Diagnosis Date    Aortic valvar stenosis 2/23/2015    Carotid stenosis, bilateral     CMT (Charcot Louise Tooth) disease     Essential hypertension, benign 12/10/2010    High risk for fracture due to osteoporosis by DEXA scan 2/23/2015    Hypercholesteremia 3/15/2010    Murmur     Myocardial infarction ([address]     Osteopenia     RA (rheumatoid arthritis) ([address] 3/15/2010       Past Surgical History:   Procedure Laterality Date    HX GYN      BTL    CA LAP,TUBAL CAUTERY      CA RENAL SCOPE,REMV FB/STONE         Social History     Socioeconomic History    Marital status:      Spouse name: Not on file    Number of children: Not on file    Years of education: Not on file    Highest education level: Not on file   Occupational History    Not on file   Tobacco Use    Smoking status: Never Smoker    Smokeless tobacco: Never Used   Vaping Use    Vaping Use: Never used   Substance and Sexual Activity    Alcohol use: No     Alcohol/week: 0.0 standard drinks    Drug use: No    Sexual activity: Not Currently   Other Topics Concern    Not on file   Social History Narrative    Not on file     Social Determinants of Health     Financial Resource Strain:     Difficulty of Paying Living Expenses: Not on file   Food Insecurity:     Worried About Running Out of Food in the Last Year: Not on file    Reddy of Food in the Last Year: Not on file   Transportation Needs:     Lack of Transportation (Medical): Not on file    Lack of Transportation (Non-Medical):  Not on file   Physical Activity:     Days of Exercise per Week: Not on file    Minutes of Exercise per Session: Not on file   Stress:     Feeling of Stress : Not on file   Social Connections:     Frequency of Communication with Friends and Family: Not on file    Frequency of Social Gatherings with Friends and Family: Not on file    Attends Gnosticism Services: Not on file    Active Member of [address] [place] [address] [employer] or Organizations: Not on file    Attends Club or Organization Meetings: Not on file    Marital Status: Not on file   Intimate Partner Violence:     Fear of Current or Ex-Partner: Not on file    Emotionally Abused: Not on file    Physically Abused: Not on file    Sexually Abused: Not on file   Housing Stability:     Unable to Pay for Housing in the Last Year: Not on file    Number of [place] in the Last Year: Not on file    Unstable Housing in the Last Year: Not on file       Family History   Problem Relation Age of Onset    Thyroid Disease Mother     Elevated Lipids Mother     Heart Disease Mother     Stroke Mother 48    Cancer Maternal Aunt         breast    Breast Cancer Maternal Aunt         over 48    Cancer Maternal Grandmother         throat/was snuff user    Heart Disease Daughter        Current Outpatient Medications   Medication Sig    hydrOXYzine pamoate (VISTARIL) 25 mg capsule Take 1 Capsule by mouth three (3) times daily as needed for Anxiety.  Repatha Syringe syringe INJECT THE CONTENTS OF ONE SYRINGE UNDER THE SKIN EVERY OTHER WEEK    famotidine (PEPCID) 40 mg tablet Take 40 mg by mouth nightly.  amLODIPine (NORVASC) 10 mg tablet Take 1 Tablet by mouth daily.  evolocumab (Repatha SureClick) pen injection 1 mL by SubCUTAneous route every fourteen (14) days.  methotrexate, PF, 25 mg/mL injection every seven (7) days.  rosuvastatin (CRESTOR) 40 mg tablet TAKE ONE TABLET BY MOUTH AT BEDTIME    omeprazole (PRILOSEC) 40 mg capsule TAKE ONE CAPSULE BY MOUTH EVERY MORNING    Blood Pressure Monitor kit Please provide with blood pressure monitor, trends elevated in office thinking it is white coat HTN, want to confirm    cholecalciferol (VITAMIN D3) (1000 Units /25 mcg) tablet Take  by mouth daily.  folic acid (FOLVITE) 1 mg tablet Take 1 Tab by mouth daily.  amitriptyline (ELAVIL) 10 mg tablet Take 1 Tablet by mouth nightly. (Patient not taking: Reported on 1/14/2022)     No current facility-administered medications for this visit. Review of Systems   Constitutional: Negative for chills, diaphoresis, fatigue, fever and unexpected weight change. Respiratory: Negative for cough and shortness of breath. Cardiovascular: Positive for leg swelling (ankle). Negative for chest pain and palpitations. Gastrointestinal: Negative for abdominal pain, nausea and vomiting. Genitourinary: Negative for dysuria, flank pain, frequency, hematuria and urgency. Musculoskeletal: Negative for myalgias.    Skin:        Fungal nail infection improving   Neurological: Positive for dizziness. Negative for speech difficulty, light-headedness, numbness and headaches. Psychiatric/Behavioral: Negative for dysphoric mood and sleep disturbance. The patient is not nervous/anxious. Vitals:    04/01/22 0917   BP: 125/67   Pulse: (!) 107   Resp: 14   Temp: 98.1 °F (36.7 °C)   TempSrc: Oral   SpO2: 99%   Weight: 126 lb 12.8 oz (57.5 kg)   Height: 4' 11\" (1.499 m)       Physical Exam  Vitals reviewed. Constitutional:       Appearance: Normal appearance. She is well-developed and well-groomed. HENT:      Right Ear: Hearing normal.      Left Ear: Hearing normal.   Neck:      Thyroid: No thyromegaly. Cardiovascular:      Rate and Rhythm: Normal rate and regular rhythm. Pulses:           Dorsalis pedis pulses are 2+ on the right side and 2+ on the left side. Heart sounds: Normal heart sounds, S1 normal and S2 normal.   Pulmonary:      Effort: Pulmonary effort is normal.      Breath sounds: Normal breath sounds. Abdominal:      General: Bowel sounds are normal.      Palpations: Abdomen is soft. Tenderness: There is no abdominal tenderness. Musculoskeletal:      Right lower leg: No edema. Left lower leg: No edema. Feet:      Comments: High arches of feet  Lymphadenopathy:      Cervical: No cervical adenopathy. Skin:     General: Skin is warm and dry. Neurological:      Mental Status: She is alert and oriented to person, place, and time. Psychiatric:         Attention and Perception: Attention normal.         Mood and Affect: Mood and affect normal.         Speech: Speech normal.         Behavior: Behavior normal. Behavior is cooperative. Thought Content:  Thought content normal.         Cognition and Memory: Cognition and memory normal.           On this date 04/01/2022 I have spent 28 minutes reviewing previous notes, test results and face to face with the patient discussing the diagnosis and importance of compliance with the treatment plan as well as documenting on the day of the visit. An electronic signature was used to authenticate this note.   -- Shelby Stover NP

## 2022-04-01 NOTE — PROGRESS NOTES
Chief Complaint   Patient presents with    Complete Physical     Pt states BP still elevated from home readings. 1. \"Have you been to the ER, urgent care clinic since your last visit? Hospitalized since your last visit? \" No    2. \"Have you seen or consulted any other health care providers outside of the [address] [place] [address] since your last visit? \" yes, rheum for follow up. 3. For patients aged 39-70: Has the patient had a colonoscopy / FIT/ Cologuard? Yes - Care Gap present. Most recent result on file      If the patient is female:    4. For patients aged 41-77: Has the patient had a mammogram within the past 2 years? Yes - Care Gap present. Most recent result on file      5. For patients aged 21-65: Has the patient had a pap smear? Yes - Care Gap present.  Most recent result on file      Health Maintenance Due   Topic Date Due    Shingrix Vaccine Age 50> (1 of 2) Never done

## 2022-04-13 ENCOUNTER — TELEPHONE (OUTPATIENT)
Dept: FAMILY MEDICINE CLINIC | Age: 57
End: 2022-04-13

## 2022-04-13 NOTE — TELEPHONE ENCOUNTER
Submitted PA through [employer] (KEY: [id number]). There was an error with your request   ?  The patient is not found with the information provided - Member Termed    Pt no longer has insurance but will have medicaid May 2022. Will need to complete PA with new insurance.

## 2022-04-20 ENCOUNTER — TELEPHONE (OUTPATIENT)
Dept: FAMILY MEDICINE CLINIC | Age: 57
End: 2022-04-20

## 2022-04-21 NOTE — TELEPHONE ENCOUNTER
Received fax from [facility] requesting records.  Faxed to [phone]

## 2022-05-03 ENCOUNTER — CLINICAL SUPPORT (OUTPATIENT)
Dept: FAMILY MEDICINE CLINIC | Age: 57
End: 2022-05-03

## 2022-05-03 ENCOUNTER — PATIENT MESSAGE (OUTPATIENT)
Dept: FAMILY MEDICINE CLINIC | Age: 57
End: 2022-05-03

## 2022-05-03 DIAGNOSIS — E55.9 VITAMIN D DEFICIENCY: ICD-10-CM

## 2022-05-03 DIAGNOSIS — E78.5 DYSLIPIDEMIA, GOAL LDL BELOW 100: Primary | ICD-10-CM

## 2022-05-03 DIAGNOSIS — E78.5 DYSLIPIDEMIA, GOAL LDL BELOW 100: Chronic | ICD-10-CM

## 2022-05-03 RX ORDER — ROSUVASTATIN CALCIUM 40 MG/1
40 TABLET, COATED ORAL
Qty: 30 TABLET | Refills: 11 | Status: SHIPPED | OUTPATIENT
Start: 2022-05-03

## 2022-05-03 NOTE — TELEPHONE ENCOUNTER
From: Santa James  To:  Marcos Coffman NP  Sent: 5/3/2022 2:26 PM EDT  Subject: Medicine     [employer] has been trying to contact you all to get my Crestar 40mg medication filled and for some reason nobodys responding to them if this could be taken care of it would be great thank you

## 2022-05-03 NOTE — PROGRESS NOTES
Mario Dubon (: 1965) is a 55 y.o. female, established patient, here for evaluation of the following chief complaint(s):  Labs Only       ASSESSMENT/PLAN:  Below is the assessment and plan developed based on review of pertinent history, physical exam, labs, studies, and medications. 1. Dyslipidemia, goal LDL below 100  -     LIPID PANEL; Future  -     CBC WITH AUTOMATED DIFF; Future  -     METABOLIC PANEL, COMPREHENSIVE; Future  2. Vitamin D deficiency  -     VITAMIN D, 25 HYDROXY; Future    An electronic signature was used to authenticate this note.   -- Cristopher Martinez NP

## 2022-05-03 NOTE — TELEPHONE ENCOUNTER
Requested Prescriptions     Pending Prescriptions Disp Refills    rosuvastatin (CRESTOR) 40 mg tablet 30 Tablet 11     Sig: Take 1 Tablet by mouth nightly.

## 2022-05-04 LAB
25(OH)D3 SERPL-MCNC: 35.7 NG/ML (ref 30–100)
ALBUMIN SERPL-MCNC: 3.8 G/DL (ref 3.5–5)
ALBUMIN/GLOB SERPL: 1.2 {RATIO} (ref 1.1–2.2)
ALP SERPL-CCNC: 94 U/L (ref 45–117)
ALT SERPL-CCNC: 19 U/L (ref 12–78)
ANION GAP SERPL CALC-SCNC: 7 MMOL/L (ref 5–15)
AST SERPL-CCNC: 16 U/L (ref 15–37)
BASOPHILS # BLD: 0.1 K/UL (ref 0–0.1)
BASOPHILS NFR BLD: 1 % (ref 0–1)
BILIRUB SERPL-MCNC: 0.2 MG/DL (ref 0.2–1)
BUN SERPL-MCNC: 20 MG/DL (ref 6–20)
BUN/CREAT SERPL: 36 (ref 12–20)
CALCIUM SERPL-MCNC: 9.2 MG/DL (ref 8.5–10.1)
CHLORIDE SERPL-SCNC: 109 MMOL/L (ref 97–108)
CHOLEST SERPL-MCNC: 205 MG/DL
CO2 SERPL-SCNC: 26 MMOL/L (ref 21–32)
CREAT SERPL-MCNC: 0.56 MG/DL (ref 0.55–1.02)
DIFFERENTIAL METHOD BLD: NORMAL
EOSINOPHIL # BLD: 0.1 K/UL (ref 0–0.4)
EOSINOPHIL NFR BLD: 2 % (ref 0–7)
ERYTHROCYTE [DISTWIDTH] IN BLOOD BY AUTOMATED COUNT: 13.2 % (ref 11.5–14.5)
GLOBULIN SER CALC-MCNC: 3.3 G/DL (ref 2–4)
GLUCOSE SERPL-MCNC: 87 MG/DL (ref 65–100)
HCT VFR BLD AUTO: 42.7 % (ref 35–47)
HDLC SERPL-MCNC: 54 MG/DL
HDLC SERPL: 3.8 {RATIO} (ref 0–5)
HGB BLD-MCNC: 13.4 G/DL (ref 11.5–16)
IMM GRANULOCYTES # BLD AUTO: 0 K/UL (ref 0–0.04)
IMM GRANULOCYTES NFR BLD AUTO: 0 % (ref 0–0.5)
LDLC SERPL CALC-MCNC: 126.6 MG/DL (ref 0–100)
LYMPHOCYTES # BLD: 1.6 K/UL (ref 0.8–3.5)
LYMPHOCYTES NFR BLD: 23 % (ref 12–49)
MCH RBC QN AUTO: 30.2 PG (ref 26–34)
MCHC RBC AUTO-ENTMCNC: 31.4 G/DL (ref 30–36.5)
MCV RBC AUTO: 96.2 FL (ref 80–99)
MONOCYTES # BLD: 0.6 K/UL (ref 0–1)
MONOCYTES NFR BLD: 9 % (ref 5–13)
NEUTS SEG # BLD: 4.6 K/UL (ref 1.8–8)
NEUTS SEG NFR BLD: 65 % (ref 32–75)
NRBC # BLD: 0 K/UL (ref 0–0.01)
NRBC BLD-RTO: 0 PER 100 WBC
PLATELET # BLD AUTO: 318 K/UL (ref 150–400)
PMV BLD AUTO: 10.3 FL (ref 8.9–12.9)
POTASSIUM SERPL-SCNC: 4.2 MMOL/L (ref 3.5–5.1)
PROT SERPL-MCNC: 7.1 G/DL (ref 6.4–8.2)
RBC # BLD AUTO: 4.44 M/UL (ref 3.8–5.2)
SODIUM SERPL-SCNC: 142 MMOL/L (ref 136–145)
TRIGL SERPL-MCNC: 122 MG/DL (ref ?–150)
VLDLC SERPL CALC-MCNC: 24.4 MG/DL
WBC # BLD AUTO: 7 K/UL (ref 3.6–11)

## 2022-05-04 NOTE — PROGRESS NOTES
Your LDL went up. Did you miss doses of the Repatha or crestor?     Blood counts normal.  Liver and kidney function normal.  Vitamin D normal

## 2022-06-16 ENCOUNTER — DOCUMENTATION ONLY (OUTPATIENT)
Dept: FAMILY MEDICINE CLINIC | Age: 57
End: 2022-06-16

## 2022-06-27 ENCOUNTER — VIRTUAL VISIT (OUTPATIENT)
Dept: FAMILY MEDICINE CLINIC | Age: 57
End: 2022-06-27
Payer: MEDICAID

## 2022-06-27 DIAGNOSIS — E78.5 DYSLIPIDEMIA, GOAL LDL BELOW 100: Primary | ICD-10-CM

## 2022-06-27 DIAGNOSIS — M81.0 OSTEOPOROSIS, UNSPECIFIED OSTEOPOROSIS TYPE, UNSPECIFIED PATHOLOGICAL FRACTURE PRESENCE: ICD-10-CM

## 2022-06-27 DIAGNOSIS — I10 ESSENTIAL HYPERTENSION, BENIGN: ICD-10-CM

## 2022-06-27 DIAGNOSIS — M06.9 RHEUMATOID ARTHRITIS INVOLVING MULTIPLE SITES, UNSPECIFIED WHETHER RHEUMATOID FACTOR PRESENT (HCC): ICD-10-CM

## 2022-06-27 PROCEDURE — 99212 OFFICE O/P EST SF 10 MIN: CPT | Performed by: NURSE PRACTITIONER

## 2022-06-27 NOTE — Clinical Note
Schedule 6/30/22 8a for lab only (lab orders on today's visit)  Also, can you send order for Prolia injection (I think there is a special form to send to [facility]) - she now has Medicaid - want to see if can get Prolia covered for her osteoporosis treatment.   Thanks

## 2022-06-27 NOTE — PROGRESS NOTES
Jameel Adam (: 1965) is a 55 y.o. female, established patient, here for evaluation of the following chief complaint(s):   Follow-up (referral to rheumatology, discuss medications)       ASSESSMENT/PLAN:  Below is the assessment and plan developed based on review of pertinent history, labs, studies, and medications. 1. Dyslipidemia, goal LDL below 751  -     METABOLIC PANEL, COMPREHENSIVE; Future  -     LIPID PANEL; Future  2. Osteoporosis, unspecified osteoporosis type, unspecified pathological fracture presence - needs Prolia approved  3. Rheumatoid arthritis involving multiple sites, unspecified whether rheumatoid factor present [facility])- patient to schedule with new rheumatologist.  4. Essential hypertension, benign      Has follow up in Oct 2022    SUBJECTIVE/OBJECTIVE:  HPI patient is seen virtually today for few medical concerns. Patient is to have labs done since she has Medicaid coverage now. Need to also try to obtain coverage for Prolia for osteoporosis. States [facility] is not covered under new Medicaid plan - will need to find new rheumatologist.    Still caring for 's grandmother. Her son is staying with her. She is POA. Daughter does bathing. She has dementia.  had previously been laid off, went to rehab in [place], states he is back and is working a new job.     Allergies   Allergen Reactions    Flomax [Tamsulosin] Other (comments)     Severe headache    Percocet [Oxycodone-Acetaminophen] Nausea and Vomiting and Other (comments)     dizziness    Lipitor [Atorvastatin] Other (comments)     Dizzy and nausea    Zetia [Ezetimibe] Other (comments)     headaches    Gabapentin Nausea and Vomiting     dizziness    Naproxen Nausea and Vomiting    Prednisone Nausea and Vomiting       Past Medical History:   Diagnosis Date    Aortic valvar stenosis 2015    Carotid stenosis, bilateral     CMT (Charcot Louise Tooth) disease     Essential hypertension, benign 12/10/2010    High risk for fracture due to osteoporosis by DEXA scan 2/23/2015    Hypercholesteremia 3/15/2010    Murmur     Myocardial infarction (HCC)     Osteopenia     RA [facility]heumatoid arthritis) ([facility] 3/15/2010       Past Surgical History:   Procedure Laterality Date    HX GYN      BTL    RI LAP,TUBAL CAUTERY      RI RENAL SCOPE,REMV FB/STONE         Social History     Socioeconomic History    Marital status:      Spouse name: Not on file    Number of children: Not on file    Years of education: Not on file    Highest education level: Not on file   Occupational History    Not on file   Tobacco Use    Smoking status: Never Smoker    Smokeless tobacco: Never Used   Vaping Use    Vaping Use: Never used   Substance and Sexual Activity    Alcohol use: No     Alcohol/week: 0.0 standard drinks    Drug use: No    Sexual activity: Not Currently   Other Topics Concern    Not on file   Social History Narrative    Not on file     Social Determinants of Health     Financial Resource Strain:     Difficulty of Paying Living Expenses: Not on file   Food Insecurity:     Worried About Running Out of Food in the Last Year: Not on file    Reddy of Food in the Last Year: Not on file   Transportation Needs:     Lack of Transportation (Medical): Not on file    Lack of Transportation (Non-Medical):  Not on file   Physical Activity:     Days of Exercise per Week: Not on file    Minutes of Exercise per Session: Not on file   Stress:     Feeling of Stress : Not on file   Social Connections:     Frequency of Communication with Friends and Family: Not on file    Frequency of Social Gatherings with Friends and Family: Not on file    Attends Amish Services: Not on file    Active Member of Clubs or Organizations: Not on file    Attends Club or Organization Meetings: Not on file    Marital Status: Not on file   Intimate Partner Violence:     Fear of Current or Ex-Partner: Not on file   Yana Dutton Emotionally Abused: Not on file    Physically Abused: Not on file    Sexually Abused: Not on file   Housing Stability:     Unable to Pay for Housing in the Last Year: Not on file    Number of [place] in the Last Year: Not on file    Unstable Housing in the Last Year: Not on file       Family History   Problem Relation Age of Onset    Thyroid Disease Mother     Elevated Lipids Mother     Heart Disease Mother     Stroke Mother 48    Cancer Maternal Aunt         breast    Breast Cancer Maternal Aunt         over 48    Cancer Maternal Grandmother         throat/was snuff user    Heart Disease Daughter        Current Outpatient Medications   Medication Sig    rosuvastatin (CRESTOR) 40 mg tablet Take 1 Tablet by mouth nightly.  hydrOXYzine pamoate (VISTARIL) 25 mg capsule Take 1 Capsule by mouth three (3) times daily as needed for Anxiety.  famotidine (PEPCID) 40 mg tablet Take 40 mg by mouth nightly.  amLODIPine (NORVASC) 10 mg tablet Take 1 Tablet by mouth daily.  evolocumab (Repatha SureClick) pen injection 1 mL by SubCUTAneous route every fourteen (14) days.  omeprazole (PRILOSEC) 40 mg capsule TAKE ONE CAPSULE BY MOUTH EVERY MORNING    Blood Pressure Monitor kit Please provide with blood pressure monitor, trends elevated in office thinking it is white coat HTN, want to confirm    cholecalciferol (VITAMIN D3) (1000 Units /25 mcg) tablet Take  by mouth daily.  folic acid (FOLVITE) 1 mg tablet Take 1 Tab by mouth daily.  amitriptyline (ELAVIL) 10 mg tablet Take 1 Tablet by mouth nightly. (Patient not taking: Reported on 1/14/2022)    methotrexate, PF, 25 mg/mL injection every seven (7) days. (Patient not taking: Reported on 6/27/2022)     No current facility-administered medications for this visit.      Review of Systems   Constitutional: Negative. Respiratory: Negative. Cardiovascular: Negative. Gastrointestinal: Negative. Genitourinary: Negative.     Neurological: Negative. No data recorded     Physical Exam  Constitutional: [x] Appears well-developed and well-nourished [x] No apparent distress      Mental status: [x] Alert and awake  [x] Oriented to person/place/time [x] Able to follow commands      Neck: [x] No visualized mass    Pulmonary/Chest: [x] Respiratory effort normal   [x] No visualized signs of difficulty breathing or respiratory distresS     Musculoskeletal:   [x] Normal range of motion of neck    Neurological:        [x] No Facial Asymmetry (Cranial nerve 7 motor function) (limited exam due to video visit)           Skin:        [x] No significant exanthematous lesions or discoloration noted on facial skin            Psychiatric:       [x] Normal Affect       [x] No Hallucinations    On this date 06/27/2022 I have spent 15 minutes reviewing previous notes, test results and face to face (virtual) with the patient discussing the diagnosis and importance of compliance with the treatment plan as well as documenting on the day of the visit. Jimbo Rodriguez, was evaluated through a synchronous (real-time) audio-video encounter. The patient (or guardian if applicable) is aware that this is a billable service, which includes applicable co-pays. This Virtual Visit was conducted with patient's (and/or legal guardian's) consent. The visit was conducted pursuant to the emergency declaration under the [address] [place] [address], [address] [place] [address] authority and the [employer] Act. Patient identification was verified, and a caregiver was present when appropriate. The patient was located at: Home: [address] [place] [address]  The provider was located at: Home:        An electronic signature was used to authenticate this note.   -- Jennifer Amado NP

## 2022-06-27 NOTE — PROGRESS NOTES
Chief Complaint   Patient presents with    Follow-up       1. \"Have you been to the ER, urgent care clinic since your last visit? Hospitalized since your last visit? \" No    2. \"Have you seen or consulted any other health care providers outside of the [address] [place] [address] since your last visit? \" No     3. For patients aged 39-70: Has the patient had a colonoscopy / FIT/ Cologuard? Yes - Care Gap present. Most recent result on file      If the patient is female:    4. For patients aged 41-77: Has the patient had a mammogram within the past 2 years? Yes - Care Gap present. Most recent result on file      5. For patients aged 21-65: Has the patient had a pap smear?  No    Health Maintenance Due   Topic Date Due    Shingrix Vaccine Age 50> (1 of 2) Never done

## 2022-06-30 ENCOUNTER — OFFICE VISIT (OUTPATIENT)
Dept: FAMILY MEDICINE CLINIC | Age: 57
End: 2022-06-30

## 2022-06-30 DIAGNOSIS — E78.5 DYSLIPIDEMIA, GOAL LDL BELOW 100: ICD-10-CM

## 2022-06-30 DIAGNOSIS — Z01.89 ROUTINE LAB DRAW: Primary | ICD-10-CM

## 2022-06-30 LAB
ALBUMIN SERPL-MCNC: 3.9 G/DL (ref 3.5–5)
ALBUMIN/GLOB SERPL: 1.3 {RATIO} (ref 1.1–2.2)
ALP SERPL-CCNC: 88 U/L (ref 45–117)
ALT SERPL-CCNC: 15 U/L (ref 12–78)
ANION GAP SERPL CALC-SCNC: 5 MMOL/L (ref 5–15)
AST SERPL-CCNC: 15 U/L (ref 15–37)
BILIRUB SERPL-MCNC: 0.5 MG/DL (ref 0.2–1)
BUN SERPL-MCNC: 10 MG/DL (ref 6–20)
BUN/CREAT SERPL: 17 (ref 12–20)
CALCIUM SERPL-MCNC: 9.6 MG/DL (ref 8.5–10.1)
CHLORIDE SERPL-SCNC: 108 MMOL/L (ref 97–108)
CHOLEST SERPL-MCNC: 154 MG/DL
CO2 SERPL-SCNC: 28 MMOL/L (ref 21–32)
CREAT SERPL-MCNC: 0.58 MG/DL (ref 0.55–1.02)
GLOBULIN SER CALC-MCNC: 2.9 G/DL (ref 2–4)
GLUCOSE SERPL-MCNC: 98 MG/DL (ref 65–100)
HDLC SERPL-MCNC: 51 MG/DL
HDLC SERPL: 3 {RATIO} (ref 0–5)
LDLC SERPL CALC-MCNC: 73.6 MG/DL (ref 0–100)
POTASSIUM SERPL-SCNC: 4 MMOL/L (ref 3.5–5.1)
PROT SERPL-MCNC: 6.8 G/DL (ref 6.4–8.2)
SODIUM SERPL-SCNC: 141 MMOL/L (ref 136–145)
TRIGL SERPL-MCNC: 147 MG/DL (ref ?–150)
VLDLC SERPL CALC-MCNC: 29.4 MG/DL

## 2022-06-30 NOTE — PROGRESS NOTES
Jasmin Meyer (: 1965) is a 55 y.o. female, established patient, here for evaluation of the following chief complaint(s):  Labs Only       ASSESSMENT/PLAN:  Below is the assessment and plan developed based on review of pertinent history, physical exam, labs, studies, and medications. 1. Routine lab draw  2. Dyslipidemia, goal LDL below 100  -     LIPID PANEL  -     METABOLIC PANEL, COMPREHENSIVE      An electronic signature was used to authenticate this note.   -- Veda Harper, NP

## 2022-07-15 ENCOUNTER — TRANSCRIBE ORDER (OUTPATIENT)
Dept: SCHEDULING | Age: 57
End: 2022-07-15

## 2022-07-15 DIAGNOSIS — K21.9 GERD (GASTROESOPHAGEAL REFLUX DISEASE): Primary | ICD-10-CM

## 2022-08-11 ENCOUNTER — HOSPITAL ENCOUNTER (OUTPATIENT)
Dept: GENERAL RADIOLOGY | Age: 57
Discharge: HOME | End: 2022-08-11
Attending: INTERNAL MEDICINE
Payer: MEDICAID

## 2022-08-11 DIAGNOSIS — K21.9 GERD (GASTROESOPHAGEAL REFLUX DISEASE): ICD-10-CM

## 2022-08-11 PROCEDURE — 74220 X-RAY XM ESOPHAGUS 1CNTRST: CPT

## 2022-10-07 ENCOUNTER — OFFICE VISIT (OUTPATIENT)
Dept: FAMILY MEDICINE CLINIC | Age: 57
End: 2022-10-07
Payer: MEDICAID

## 2022-10-07 VITALS
OXYGEN SATURATION: 98 % | RESPIRATION RATE: 14 BRPM | HEART RATE: 93 BPM | HEIGHT: 59 IN | SYSTOLIC BLOOD PRESSURE: 134 MMHG | WEIGHT: 124.8 LBS | TEMPERATURE: 97.7 F | BODY MASS INDEX: 25.16 KG/M2 | DIASTOLIC BLOOD PRESSURE: 60 MMHG

## 2022-10-07 DIAGNOSIS — G60.0 CMT (CHARCOT-MARIE-TOOTH DISEASE): ICD-10-CM

## 2022-10-07 DIAGNOSIS — E78.5 DYSLIPIDEMIA, GOAL LDL BELOW 100: Primary | ICD-10-CM

## 2022-10-07 DIAGNOSIS — I10 ESSENTIAL HYPERTENSION, BENIGN: ICD-10-CM

## 2022-10-07 DIAGNOSIS — M81.0 OSTEOPOROSIS, UNSPECIFIED OSTEOPOROSIS TYPE, UNSPECIFIED PATHOLOGICAL FRACTURE PRESENCE: ICD-10-CM

## 2022-10-07 DIAGNOSIS — Z23 NEEDS FLU SHOT: ICD-10-CM

## 2022-10-07 DIAGNOSIS — I65.23 CAROTID STENOSIS, BILATERAL: ICD-10-CM

## 2022-10-07 DIAGNOSIS — M06.9 RHEUMATOID ARTHRITIS INVOLVING MULTIPLE SITES, UNSPECIFIED WHETHER RHEUMATOID FACTOR PRESENT (HCC): ICD-10-CM

## 2022-10-07 PROCEDURE — 99214 OFFICE O/P EST MOD 30 MIN: CPT | Performed by: NURSE PRACTITIONER

## 2022-10-07 PROCEDURE — 90686 IIV4 VACC NO PRSV 0.5 ML IM: CPT | Performed by: NURSE PRACTITIONER

## 2022-10-07 NOTE — PROGRESS NOTES
Chief Complaint   Patient presents with    Hypertension     6m fu     Cholesterol Problem     6m fu        1. \"Have you been to the ER, urgent care clinic since your last visit? Hospitalized since your last visit? \" No    2. \"Have you seen or consulted any other health care providers outside of the [address] [place] [address] since your last visit? \" No     3. For patients aged 39-70: Has the patient had a colonoscopy / FIT/ Cologuard? Yes - Care Gap present. Most recent result on file      If the patient is female:    4. For patients aged 41-77: Has the patient had a mammogram within the past 2 years? Yes - Care Gap present. Most recent result on file      5. For patients aged 21-65: Has the patient had a pap smear? Yes - Care Gap present.  Most recent result on file    Health Maintenance Due   Topic Date Due    Shingrix Vaccine Age 49> (1 of 2) Never done    COVID-19 Vaccine (4 - Booster for Pfizer series) 04/15/2022    Flu Vaccine (1) 08/01/2022

## 2022-10-07 NOTE — PROGRESS NOTES
Lizbeth Rodriguez (: 1965) is a 55 y.o. female, established patient, here for evaluation of the following chief complaint(s):  Hypertension (6m fu ) and Cholesterol Problem (6m fu )       ASSESSMENT/PLAN:  Below is the assessment and plan developed based on review of pertinent history, physical exam, labs, studies, and medications. 1. Dyslipidemia, goal LDL below 100  2. Essential hypertension, benign - stable  3. Carotid stenosis, bilateral  -     DUPLEX CAROTID BILATERAL; Future  4. Osteoporosis, unspecified osteoporosis type, unspecified pathological fracture presence - will check for Prolia approval at [facility]  5. Rheumatoid arthritis involving multiple sites, unspecified whether rheumatoid factor present ([facility] [address] - patient to schedule with new rheum on medicaid. 6. CMT (Charcot-Louise-Tooth disease)  7. Needs flu shot  -     INFLUENZA, FLUARIX, FLULAVAL, FLUZONE (AGE 6 MO+), AFLURIA(AGE 3Y+) IM, PF, 0.5 ML    Return in about 3 months (around 2023). SUBJECTIVE/OBJECTIVE:  HPI  patient comes in today for follow up. Still caring for 's grandmother. she is on hospice. She had a CVA Tuesday,  things have started declining. She is POA. Daughter will be executor of estate. She has been  from  for 4 years, he is alcoholic. She has been having some dizziness. Yesterday at home 130-140s. Still has not found rheumatologist covered under insurance. Has nto checked [facility]  Not taking MTX - was upsetting stomach, nausea      HTN - Taking amlodipine. Tolerating medication. Denies chest pains, palpitations, dyspnea. Per cardiology - Echo OK. Aortic valve mildly blocked; fu one year     Was doing Repatha for cholesterol. Last LDL 76 in Oct 2021, 63 in 2021, unchanged from 58  in 2020, down from 70 in 2020. Her LDL prior to [address] [place] [address] highest reading 383. Taking Crestor 40mg. Has missed 5-6 doses in last 6 weeks. Had Carotid doppler in 2017:  1. Bilateral <50% stenosis of the internal carotid arteries. 2. No significant stenosis in the external carotid arteries  bilaterally. 3. Antegrade flow in both vertebral arteries. 4. Normal flow in both subclavian arteries. Plaque Morphology:  1. Heterogeneous plaque in the bulb and right ICA. 2. Heterogeneous plaque in the bulb and left ICA. No recent dizziness     Hx osteoporosis - had Prolia injection on 2/15/21. DEXA on 2/4/21 - IMPRESSION  This patient is osteoporotic using the World Health Organization criteria. As compared to the prior study, there has been a trend toward an interval decrease in the left femoral neck. Need to get Prolia approved through medicaid     She had CTA abd in 2016 - negative for AAA. US in 2014 negative for AAA     Had CXR in Aug 2020, poss pulmonary nodule.  had CT chest on 9/8/20 - no pulmonary nodule noted    Review of Systems   Constitutional:  Negative for chills, diaphoresis, fatigue, fever and unexpected weight change. Respiratory:  Negative for cough and shortness of breath. Cardiovascular:  Negative for chest pain, palpitations and leg swelling. Gastrointestinal:  Negative for abdominal pain, blood in stool, constipation, diarrhea, nausea and vomiting. Endocrine: Negative for cold intolerance and heat intolerance. Genitourinary:  Negative for dysuria, flank pain, frequency, hematuria and urgency. Musculoskeletal:  Positive for arthralgias and joint swelling. Negative for back pain and myalgias. Skin: Negative. Neurological:  Negative for dizziness, speech difficulty, light-headedness, numbness and headaches. Psychiatric/Behavioral:  Negative for dysphoric mood and sleep disturbance. The patient is not nervous/anxious. Physical Exam  Vitals reviewed. Constitutional:       Appearance: Normal appearance. She is well-developed and well-groomed.    HENT:      Right Ear: Hearing normal.      Left Ear: Hearing normal.   Neck:      Thyroid: No thyromegaly. Vascular: Carotid bruit present. Cardiovascular:      Rate and Rhythm: Normal rate and regular rhythm. Pulses:           Dorsalis pedis pulses are 2+ on the right side and 2+ on the left side. Heart sounds: S1 normal and S2 normal. Murmur heard. Systolic murmur is present. Pulmonary:      Effort: Pulmonary effort is normal.      Breath sounds: Normal breath sounds. Abdominal:      General: Bowel sounds are normal.      Palpations: Abdomen is soft. Tenderness: There is no abdominal tenderness. Musculoskeletal:      Right lower leg: No edema. Left lower leg: No edema. Lymphadenopathy:      Cervical: No cervical adenopathy. Skin:     General: Skin is warm and dry. Neurological:      Mental Status: She is alert and oriented to person, place, and time. Psychiatric:         Attention and Perception: Attention normal.         Mood and Affect: Mood and affect normal.         Speech: Speech normal.         Behavior: Behavior normal. Behavior is cooperative. Thought Content: Thought content normal.         Cognition and Memory: Cognition and memory normal.     On this date 10/07/2022 I have spent 30 minutes reviewing previous notes, test results and face to face with the patient discussing the diagnosis and importance of compliance with the treatment plan as well as documenting on the day of the visit. An electronic signature was used to authenticate this note.   -- Max Koyanagi, NP

## 2022-10-24 ENCOUNTER — HOSPITAL ENCOUNTER (OUTPATIENT)
Dept: VASCULAR SURGERY | Age: 57
Discharge: HOME | End: 2022-10-24
Payer: MEDICAID

## 2022-10-24 DIAGNOSIS — I65.23 CAROTID STENOSIS, BILATERAL: ICD-10-CM

## 2022-10-24 LAB
LEFT CCA DIST DIAS: 23.9 CM/S
LEFT CCA DIST SYS: 122.6 CM/S
LEFT CCA PROX DIAS: 17.3 CM/S
LEFT CCA PROX SYS: 124.8 CM/S
LEFT ECA DIAS: 25.5 CM/S
LEFT ECA SYS: 225.9 CM/S
LEFT ICA DIST DIAS: 26.1 CM/S
LEFT ICA DIST SYS: 102.9 CM/S
LEFT ICA MID DIAS: 21.7 CM/S
LEFT ICA MID SYS: 87.5 CM/S
LEFT ICA PROX DIAS: 19.5 CM/S
LEFT ICA PROX SYS: 100.7 CM/S
LEFT ICA/CCA SYS: 0.8 NO UNITS
LEFT VERTEBRAL DIAS: 10.8 CM/S
LEFT VERTEBRAL SYS: 56.4 CM/S
RIGHT CCA DIST DIAS: 25.5 CM/S
RIGHT CCA DIST SYS: 109.6 CM/S
RIGHT CCA PROX DIAS: 25.5 CM/S
RIGHT CCA PROX SYS: 129 CM/S
RIGHT ECA DIAS: 0 CM/S
RIGHT ECA SYS: 177.4 CM/S
RIGHT ICA DIST DIAS: 25.5 CM/S
RIGHT ICA DIST SYS: 99.9 CM/S
RIGHT ICA MID DIAS: 25.5 CM/S
RIGHT ICA MID SYS: 96.6 CM/S
RIGHT ICA PROX DIAS: 15.8 CM/S
RIGHT ICA PROX SYS: 86.9 CM/S
RIGHT ICA/CCA SYS: 0.9 NO UNITS
RIGHT VERTEBRAL DIAS: 8.6 CM/S
RIGHT VERTEBRAL SYS: 59 CM/S
VAS LEFT SUBCLAVIAN PROX EDV: 15.1 CM/S
VAS LEFT SUBCLAVIAN PROX PSV: 153.3 CM/S
VAS RIGHT SUBCLAVIAN PROX EDV: 12.6 CM/S
VAS RIGHT SUBCLAVIAN PROX PSV: 213 CM/S

## 2022-10-24 PROCEDURE — 93880 EXTRACRANIAL BILAT STUDY: CPT

## 2022-10-24 RX ORDER — ASPIRIN 81 MG/1
81 TABLET ORAL DAILY
COMMUNITY
Start: 2022-10-24

## 2022-10-25 NOTE — PROGRESS NOTES
Carotid dopplers on arteries in your neck mostly unchanged from previous test.  Continue taking rosuvastatin. Take a baby aspirin 81mg if you can tolerate.

## 2022-12-11 RX ORDER — AMLODIPINE BESYLATE 10 MG/1
TABLET ORAL
Qty: 90 TABLET | Refills: 3 | Status: SHIPPED | OUTPATIENT
Start: 2022-12-11

## 2023-01-09 ENCOUNTER — TRANSCRIBE ORDER (OUTPATIENT)
Dept: SCHEDULING | Age: 58
End: 2023-01-09

## 2023-01-09 DIAGNOSIS — Z12.31 SCREENING MAMMOGRAM FOR HIGH-RISK PATIENT: Primary | ICD-10-CM

## 2023-01-12 ENCOUNTER — OFFICE VISIT (OUTPATIENT)
Dept: FAMILY MEDICINE CLINIC | Age: 58
End: 2023-01-12
Payer: MEDICAID

## 2023-01-12 VITALS
BODY MASS INDEX: 24.88 KG/M2 | RESPIRATION RATE: 12 BRPM | WEIGHT: 123.4 LBS | OXYGEN SATURATION: 97 % | TEMPERATURE: 98.1 F | DIASTOLIC BLOOD PRESSURE: 57 MMHG | HEART RATE: 100 BPM | SYSTOLIC BLOOD PRESSURE: 123 MMHG | HEIGHT: 59 IN

## 2023-01-12 DIAGNOSIS — E78.5 DYSLIPIDEMIA, GOAL LDL BELOW 100: ICD-10-CM

## 2023-01-12 DIAGNOSIS — M06.9 RHEUMATOID ARTHRITIS INVOLVING MULTIPLE SITES, UNSPECIFIED WHETHER RHEUMATOID FACTOR PRESENT (HCC): ICD-10-CM

## 2023-01-12 DIAGNOSIS — I10 ESSENTIAL HYPERTENSION, BENIGN: ICD-10-CM

## 2023-01-12 DIAGNOSIS — R30.0 DYSURIA: Primary | ICD-10-CM

## 2023-01-12 DIAGNOSIS — M81.0 OSTEOPOROSIS, UNSPECIFIED OSTEOPOROSIS TYPE, UNSPECIFIED PATHOLOGICAL FRACTURE PRESENCE: ICD-10-CM

## 2023-01-12 DIAGNOSIS — L30.9 DERMATITIS: ICD-10-CM

## 2023-01-12 PROCEDURE — 99213 OFFICE O/P EST LOW 20 MIN: CPT | Performed by: NURSE PRACTITIONER

## 2023-01-12 PROCEDURE — 3074F SYST BP LT 130 MM HG: CPT | Performed by: NURSE PRACTITIONER

## 2023-01-12 PROCEDURE — 3078F DIAST BP <80 MM HG: CPT | Performed by: NURSE PRACTITIONER

## 2023-01-12 RX ORDER — TRIAMCINOLONE ACETONIDE 1 MG/G
CREAM TOPICAL 2 TIMES DAILY
Qty: 30 G | Refills: 1 | Status: SHIPPED | OUTPATIENT
Start: 2023-01-12

## 2023-01-12 NOTE — PROGRESS NOTES
Garret Mora (: 1965) is a 65 y.o. female, established patient, here for evaluation of the following chief complaint(s):  Cholesterol Problem (3m fu )       ASSESSMENT/PLAN:  Below is the assessment and plan developed based on review of pertinent history, physical exam, labs, studies, and medications. 1. Dysuria  -     URINALYSIS W/MICROSCOPIC; Future  -     CULTURE, URINE; Future  2. Dermatitis  -     triamcinolone acetonide (KENALOG) 0.1 % topical cream; Apply  to affected area two (2) times a day. use thin layer, Normal, Disp-30 g, R-1  3. Essential hypertension, benign  4. Dyslipidemia, goal LDL below 100  5. Osteoporosis, unspecified osteoporosis type, unspecified pathological fracture presence  6. Rheumatoid arthritis involving multiple sites, unspecified whether rheumatoid factor present ([facility] [address]    Return if symptoms worsen or fail to improve. To return for CPE/pap in May 2023      SUBJECTIVE/OBJECTIVE:  HPI  patient comes in today for follow up. Her 's grandmother passed, her daughter is executor of estate. States family members are still arguing over money/inheritance. She has been  from  for 4 years, he is alcoholic. Burning with urination. Some frequency, urgency. N  lower abd pain. Drinks water. Wipes front to back. Sometimes holds urine. No douching, baths. Urinates after sex. Both arms have been itching,  only located on left arm. No new soaps, detergents. No one with similar rash. HTN - Taking amlodipine. Tolerating medication. Denies chest pains, palpitations, dyspnea. Per cardiology - Echo OK. Aortic valve mildly blocked; fu one year     Was doing Repatha for cholesterol. Last LDL 73 in 2022, 76 in Oct 2021, 63 in 2021, unchanged from 58  in 2020, down from 70 in 2020. Her LDL prior to [address] [facility] [address] highest reading 383. Taking Crestor 40mg. Had Carotid doppler in 2017:  1.  Bilateral <50% stenosis of the internal carotid arteries. 2. No significant stenosis in the external carotid arteries  bilaterally. 3. Antegrade flow in both vertebral arteries. 4. Normal flow in both subclavian arteries. Plaque Morphology:  1. Heterogeneous plaque in the bulb and right ICA. 2. Heterogeneous plaque in the bulb and left ICA. No recent dizziness     Hx osteoporosis - had Prolia injection on 2/15/21. DEXA on 2/4/21 - IMPRESSION  This patient is osteoporotic using the World Health Organization criteria. As compared to the prior study, there has been a trend toward an interval decrease in the left femoral neck. Need to get Prolia approved through medicaid     She had CTA abd in 2016 - negative for AAA.   US in 2014 negative for AAA     Had CXR in Aug 2020, poss pulmonary nodule.  had CT chest on 9/8/20 - no pulmonary nodule noted  Allergies   Allergen Reactions    Flomax [Tamsulosin] Other (comments)     Severe headache    Percocet [Oxycodone-Acetaminophen] Nausea and Vomiting and Other (comments)     dizziness    Lipitor [Atorvastatin] Other (comments)     Dizzy and nausea    Zetia [Ezetimibe] Other (comments)     headaches    Gabapentin Nausea and Vomiting     dizziness    Naproxen Nausea and Vomiting    Prednisone Nausea and Vomiting       Past Medical History:   Diagnosis Date    Aortic valvar stenosis 2/23/2015    Carotid stenosis, bilateral     CMT (Charcot Louise Tooth) disease     Essential hypertension, benign 12/10/2010    High risk for fracture due to osteoporosis by DEXA scan 2/23/2015    Hypercholesteremia 3/15/2010    Murmur     Myocardial infarction ([address]     Osteopenia     RA (rheumatoid arthritis) ([address] 3/15/2010       Past Surgical History:   Procedure Laterality Date    HX GYN      BTL    TN LAPAROSCOPY FULGURATION OVIDUCTS      TN RNL NDSC NFROT/PLOT W/RMVL FB/CALCULUS         Social History     Socioeconomic History    Marital status: LEGALLY      Spouse name: Not on file    Number of children: Not on file Years of education: Not on file    Highest education level: Not on file   Occupational History    Not on file   Tobacco Use    Smoking status: Never    Smokeless tobacco: Never   Vaping Use    Vaping Use: Never used   Substance and Sexual Activity    Alcohol use: No     Alcohol/week: 0.0 standard drinks    Drug use: No    Sexual activity: Not Currently   Other Topics Concern    Not on file   Social History Narrative    Not on file     Social Determinants of Health     Financial Resource Strain: Medium Risk    Difficulty of Paying Living Expenses: Somewhat hard   Food Insecurity: Food Insecurity Present    Worried About Running Out of Food in the Last Year: Sometimes true    Ran Out of Food in the Last Year: Sometimes true   Transportation Needs: Not on file   Physical Activity: Not on file   Stress: Not on file   Social Connections: Not on file   Intimate Partner Violence: Not on file   Housing Stability: Not on file       Family History   Problem Relation Age of Onset    Thyroid Disease Mother     Elevated Lipids Mother     Heart Disease Mother     Stroke Mother 48    Cancer Maternal Aunt         breast    Breast Cancer Maternal Aunt         over 48    Cancer Maternal Grandmother         throat/was snuff user    Heart Disease Daughter        Current Outpatient Medications   Medication Sig    triamcinolone acetonide (KENALOG) 0.1 % topical cream Apply  to affected area two (2) times a day. use thin layer    amLODIPine (NORVASC) 10 mg tablet TAKE ONE TABLET BY MOUTH ONE TIME DAILY    Repatha SureClick pen injection INJECT 1ML UNDER THE SKIN EVERY 14 DAYS    rosuvastatin (CRESTOR) 40 mg tablet Take 1 Tablet by mouth nightly. hydrOXYzine pamoate (VISTARIL) 25 mg capsule Take 1 Capsule by mouth three (3) times daily as needed for Anxiety. omeprazole (PRILOSEC) 40 mg capsule TAKE ONE CAPSULE BY MOUTH EVERY MORNING    cholecalciferol (VITAMIN D3) (1000 Units /25 mcg) tablet Take  by mouth daily.     amitriptyline (ELAVIL) 10 mg tablet Take 1 Tablet by mouth nightly. (Patient not taking: No sig reported)     No current facility-administered medications for this visit. Review of Systems   Constitutional:  Negative for chills, diaphoresis, fatigue, fever and unexpected weight change. Respiratory:  Negative for cough and shortness of breath. Cardiovascular:  Negative for chest pain, palpitations and leg swelling. Gastrointestinal:  Negative for abdominal pain, blood in stool, constipation, diarrhea, nausea and vomiting. Endocrine: Negative for cold intolerance and heat intolerance. Genitourinary:  Negative for dysuria, flank pain, frequency, hematuria and urgency. Musculoskeletal:  Positive for arthralgias and joint swelling. Negative for back pain and myalgias. Skin:  Positive for rash (left arm). Neurological:  Negative for dizziness, speech difficulty, light-headedness, numbness and headaches. Psychiatric/Behavioral:  Negative for dysphoric mood and sleep disturbance. The patient is not nervous/anxious. Vitals:    01/12/23 0811 01/12/23 0817   BP: (!) 147/65 (!) 123/57   Pulse: 100    Resp: 12    Temp: 98.1 °F (36.7 °C)    TempSrc: Oral    SpO2: 97%    Weight: 123 lb 6.4 oz (56 kg)    Height: 4' 11\" (1.499 m)      Physical Exam  Vitals reviewed. Constitutional:       Appearance: Normal appearance. She is well-developed and well-groomed. HENT:      Right Ear: Hearing normal.      Left Ear: Hearing normal.   Neck:      Thyroid: No thyromegaly. Cardiovascular:      Rate and Rhythm: Normal rate and regular rhythm. Heart sounds: Murmur heard. Systolic murmur is present. Pulmonary:      Effort: Pulmonary effort is normal.      Breath sounds: Normal breath sounds. Abdominal:      General: Bowel sounds are normal.      Palpations: Abdomen is soft. Tenderness: There is no abdominal tenderness. There is no right CVA tenderness or left CVA tenderness.    Lymphadenopathy:      Cervical: No cervical adenopathy. Skin:     General: Skin is warm and dry. Findings: Rash present. Rash is papular (noted left arm in antecubital region). Neurological:      Mental Status: She is alert and oriented to person, place, and time. Psychiatric:         Attention and Perception: Attention normal.         Mood and Affect: Mood and affect normal.         Speech: Speech normal.         Behavior: Behavior normal. Behavior is cooperative. Thought Content: Thought content normal.         Cognition and Memory: Cognition and memory normal.     On this date 01/12/2023 I have spent 25 minutes reviewing previous notes, test results and face to face with the patient discussing the diagnosis and importance of compliance with the treatment plan as well as documenting on the day of the visit. An electronic signature was used to authenticate this note.   -- Brett Martinez, KAITLYNN

## 2023-01-12 NOTE — PROGRESS NOTES
Chief Complaint   Patient presents with    Cholesterol Problem     3m fu        1. \"Have you been to the ER, urgent care clinic since your last visit? Hospitalized since your last visit? \" No    2. \"Have you seen or consulted any other health care providers outside of the [address] [place] [address] since your last visit? \" No     3. For patients aged 39-70: Has the patient had a colonoscopy / FIT/ Cologuard? Yes - Care Gap present. Most recent result on file      If the patient is female:    4. For patients aged 41-77: Has the patient had a mammogram within the past 2 years? Yes - Care Gap present. Most recent result on file      5. For patients aged 21-65: Has the patient had a pap smear? Yes - Care Gap present.  Most recent result on file    Health Maintenance Due   Topic Date Due    Shingles Vaccine (1 of 2) Never done

## 2023-01-17 ENCOUNTER — OFFICE VISIT (OUTPATIENT)
Dept: FAMILY MEDICINE CLINIC | Age: 58
End: 2023-01-17
Payer: MEDICAID

## 2023-01-17 DIAGNOSIS — R68.89 FLU-LIKE SYMPTOMS: Primary | ICD-10-CM

## 2023-01-17 LAB
FLUAV+FLUBV AG NOSE QL IA.RAPID: NEGATIVE
FLUAV+FLUBV AG NOSE QL IA.RAPID: NEGATIVE
SARS-COV-2 PCR, POC: NEGATIVE
VALID INTERNAL CONTROL?: YES

## 2023-01-17 PROCEDURE — 87635 SARS-COV-2 COVID-19 AMP PRB: CPT | Performed by: NURSE PRACTITIONER

## 2023-01-17 PROCEDURE — 87502 INFLUENZA DNA AMP PROBE: CPT | Performed by: NURSE PRACTITIONER

## 2023-01-17 NOTE — PROGRESS NOTES
Grady Pike (: 1965) is a 65 y.o. female, established patient, here for evaluation of the following chief complaint(s):  Testing (Flu and covid test )       ASSESSMENT/PLAN:  Below is the assessment and plan developed based on review of pertinent history, physical exam, labs, studies, and medications. 1. Flu-like symptoms  -     POCT COVID-19, SARS-COV-2, PCR  -     AMB POC INFLUENZA A  AND B REAL-TIME RT-PCR    Flu and covid negative,  Likely viral infection  Home care: For dry cough: medications containing dextromethorphan, such as Delsym, Robitussin DM or Mucinex DM  For congestion or sinus pressure: decongestant nasal sprays, such as Afrin (for up to 3 days), medications containing guaifenesin to help break up mucus, such as Mucinex or Robitussin, nasal steroid sprays, such as Flonase, Sensimist, Rhinocort or Nasonex and saline nasal sprays, neti pot or sinus rinse bottle  For runny nose, sneezing or watery/itchy eyes: less sedating antihistamines, such as loratidine (Claritin), fexofenadine (Allegra) or Cetirizine (Zyrtec)  For a combination of cold/flu symptoms: multi-symptom cold/flu products, such as Dayquil, Nyquil, Theraflu and Yumiko-Dobbins Plus     If your symptoms persist for longer than 7 days call office and if you develop fever >101, chills, chest pains, shortness of breath, then you should be seen at urgent care or the emergency room,. An electronic signature was used to authenticate this note.   -- Blanchie Phalen, NP

## 2023-01-17 NOTE — PROGRESS NOTES
Chief Complaint   Patient presents with    Testing     Flu and covid test        Results for orders placed or performed in visit on 01/17/23   POCT COVID-19, SARS-COV-2, PCR   Result Value Ref Range    SARS-COV-2 PCR, POC Negative Negative   AMB POC INFLUENZA A  AND B REAL-TIME RT-PCR   Result Value Ref Range    VALID INTERNAL CONTROL POC Yes     Influenza A Ag POC Negative Negative    Influenza B Ag POC Negative Negative

## 2023-01-24 ENCOUNTER — PATIENT MESSAGE (OUTPATIENT)
Dept: FAMILY MEDICINE CLINIC | Age: 58
End: 2023-01-24

## 2023-01-24 DIAGNOSIS — R05.1 ACUTE COUGH: Primary | ICD-10-CM

## 2023-01-24 RX ORDER — BENZONATATE 200 MG/1
200 CAPSULE ORAL
Qty: 21 CAPSULE | Refills: 0 | Status: SHIPPED | OUTPATIENT
Start: 2023-01-24 | End: 2023-01-31

## 2023-01-24 NOTE — TELEPHONE ENCOUNTER
From: Conchis De Jesus  To: Neeta Draper NP  Sent: 1/24/2023 8:04 AM EST  Subject: Coughing     My cough has not gotten any better its to the point where I am getting no sleep no appetite gagging me so bad to where Im throwing up. Claribele tried cough drops and a medication over-the-counter and it is not helping at all, you said, let you know if its not approved and you would call something then that would be so awesome. I would like a good night sleep thank you.

## 2023-01-26 ENCOUNTER — DOCUMENTATION ONLY (OUTPATIENT)
Dept: FAMILY MEDICINE CLINIC | Age: 58
End: 2023-01-26

## 2023-02-17 ENCOUNTER — HOSPITAL ENCOUNTER (OUTPATIENT)
Dept: MAMMOGRAPHY | Age: 58
End: 2023-02-17
Payer: MEDICAID

## 2023-02-17 DIAGNOSIS — Z12.31 SCREENING MAMMOGRAM FOR HIGH-RISK PATIENT: ICD-10-CM

## 2023-02-17 PROCEDURE — 77063 BREAST TOMOSYNTHESIS BI: CPT

## 2023-02-20 ENCOUNTER — HOSPITAL ENCOUNTER (OUTPATIENT)
Dept: INFUSION THERAPY | Age: 58
Discharge: HOME | End: 2023-02-20
Payer: MEDICAID

## 2023-02-20 VITALS
RESPIRATION RATE: 16 BRPM | SYSTOLIC BLOOD PRESSURE: 122 MMHG | DIASTOLIC BLOOD PRESSURE: 71 MMHG | TEMPERATURE: 98.5 F | OXYGEN SATURATION: 94 % | WEIGHT: 122.4 LBS | BODY MASS INDEX: 24.72 KG/M2 | HEART RATE: 89 BPM

## 2023-02-20 LAB
ANION GAP BLD CALC-SCNC: 14 MMOL/L (ref 10–20)
CA-I BLD-MCNC: 1.15 MMOL/L (ref 1.12–1.32)
CHLORIDE BLD-SCNC: 106 MMOL/L (ref 98–107)
CO2 BLD-SCNC: 25.2 MMOL/L (ref 21–32)
CREAT BLD-MCNC: 0.6 MG/DL (ref 0.6–1.3)
GLUCOSE BLD-MCNC: 99 MG/DL (ref 65–100)
MAGNESIUM SERPL-MCNC: 2.3 MG/DL (ref 1.6–2.4)
PHOSPHATE SERPL-MCNC: 3.3 MG/DL (ref 2.6–4.7)
POTASSIUM BLD-SCNC: 3.5 MMOL/L (ref 3.5–5.1)
SERVICE CMNT-IMP: NORMAL
SODIUM BLD-SCNC: 144 MMOL/L (ref 136–145)

## 2023-02-20 PROCEDURE — 74011250636 HC RX REV CODE- 250/636: Performed by: NURSE PRACTITIONER

## 2023-02-20 PROCEDURE — 84100 ASSAY OF PHOSPHORUS: CPT

## 2023-02-20 PROCEDURE — 96372 THER/PROPH/DIAG INJ SC/IM: CPT

## 2023-02-20 PROCEDURE — 80047 BASIC METABLC PNL IONIZED CA: CPT

## 2023-02-20 PROCEDURE — 83735 ASSAY OF MAGNESIUM: CPT

## 2023-02-20 PROCEDURE — 36415 COLL VENOUS BLD VENIPUNCTURE: CPT

## 2023-02-20 RX ADMIN — DENOSUMAB 60 MG: 60 INJECTION SUBCUTANEOUS at 10:18

## 2023-02-21 NOTE — PROGRESS NOTES
[facility] Visit Note    5052 Pt arrived at [facility] ambulatory and in no distress for Prolia. Assessment completed, no new complaints voiced. Pt denies recent or upcoming dental work. Patient denied having any symptoms of COVID-19, i.e. SOB, coughing, fever, or generally not feeling well. Also denies having been exposed to COVID-19 recently or having had any recent contact with family/friend that has a pending COVID test.     Visit Vitals  /71   Pulse 89   Temp 98.5 °F (36.9 °C)   Resp 16   Wt 55.5 kg (122 lb 6.4 oz)   SpO2 94%   BMI 24.72 kg/m²     iCa 1.15 See [facility] for remaining lab results    Medications received:  Medications Administered       denosumab (PROLIA) injection 60 mg       Admin Date  02/20/2023 Action  Given Dose  60 mg Route  SubCUTAneous Administered By  Cherelle Black, RN                      1020 Tolerated treatment well, no adverse reaction noted. D/Cd from [facility] ambulatory and in no distress accompanied by self.   Next appt 8/21/23

## 2023-03-14 ENCOUNTER — DOCUMENTATION ONLY (OUTPATIENT)
Dept: FAMILY MEDICINE CLINIC | Age: 58
End: 2023-03-14

## 2023-03-14 ENCOUNTER — PATIENT MESSAGE (OUTPATIENT)
Dept: FAMILY MEDICINE CLINIC | Age: 58
End: 2023-03-14

## 2023-03-14 NOTE — PROGRESS NOTES
PA for Repatha Sureclick 608NU/IQ auto-injectors was denied. Co-pay support may be available through the 's copay card program, link: [email or web address] com

## 2023-03-16 NOTE — TELEPHONE ENCOUNTER
Linda Neal 3/14/2023 2:53 PM EDT      ----- Message -----  From: Larry Duedenys  Sent: 3/14/2023 1:03 PM EDT  To: Lillian Bancroft Nurse [place]  Subject: Ama Morrell     Just talk to my pharmacist and he said that Ama Porsche is not going to be covered by the insurance, so what is our next step to replace this or what do we need to do? I also talked to the nurse at [facility] and she said they are offering a cholesterol shot now.

## 2023-03-16 NOTE — TELEPHONE ENCOUNTER
Aviva Cam like Flaquita Masters got a denial.  Can we see what the denial was for? She has been on this medication for a while, is already on max dose of Crestor 40mg and has a zetia intolerance. Her LDL has been controlled under 100 on medication   Last LDL 73 in June 2022, 76 in Oct 2021, 63 in March 2021, unchanged from 58  in Sept 2020, down from 70 in Jan 2020. Her LDL prior to [address] highest reading 383.

## 2023-03-24 ENCOUNTER — OFFICE VISIT (OUTPATIENT)
Dept: FAMILY MEDICINE CLINIC | Age: 58
End: 2023-03-24

## 2023-03-24 DIAGNOSIS — R30.0 DYSURIA: ICD-10-CM

## 2023-03-24 DIAGNOSIS — E78.5 DYSLIPIDEMIA, GOAL LDL BELOW 100: Primary | ICD-10-CM

## 2023-03-25 LAB
APPEARANCE UR: ABNORMAL
BACTERIA SPEC CULT: NORMAL
BACTERIA URNS QL MICRO: NEGATIVE /HPF
BILIRUB UR QL: NEGATIVE
CHOLEST SERPL-MCNC: 232 MG/DL
COLOR UR: ABNORMAL
EPITH CASTS URNS QL MICRO: ABNORMAL /LPF
GLUCOSE UR STRIP.AUTO-MCNC: NEGATIVE MG/DL
HDLC SERPL-MCNC: 55 MG/DL
HDLC SERPL: 4.2 (ref 0–5)
HGB UR QL STRIP: NEGATIVE
HYALINE CASTS URNS QL MICRO: ABNORMAL /LPF (ref 0–5)
KETONES UR QL STRIP.AUTO: NEGATIVE MG/DL
LDLC SERPL CALC-MCNC: 145.8 MG/DL (ref 0–100)
LEUKOCYTE ESTERASE UR QL STRIP.AUTO: NEGATIVE
NITRITE UR QL STRIP.AUTO: NEGATIVE
PH UR STRIP: 5 (ref 5–8)
PROT UR STRIP-MCNC: ABNORMAL MG/DL
RBC #/AREA URNS HPF: ABNORMAL /HPF (ref 0–5)
SERVICE CMNT-IMP: NORMAL
SP GR UR REFRACTOMETRY: 1.02 (ref 1–1.03)
TRIGL SERPL-MCNC: 156 MG/DL (ref ?–150)
UROBILINOGEN UR QL STRIP.AUTO: 0.2 EU/DL (ref 0.2–1)
VLDLC SERPL CALC-MCNC: 31.2 MG/DL
WBC URNS QL MICRO: ABNORMAL /HPF (ref 0–4)

## 2023-03-27 ENCOUNTER — PATIENT MESSAGE (OUTPATIENT)
Dept: FAMILY MEDICINE CLINIC | Age: 58
End: 2023-03-27

## 2023-03-27 NOTE — TELEPHONE ENCOUNTER
Kyle Shearer 3/27/2023 2:44 PM EDT      ----- Message -----  From: Radha Olson  Sent: 3/27/2023 2:36 PM EDT  To: [facility] Nurse Pool  Subject: Question regarding LIPID PANEL     So with these results what are we considering?

## 2023-03-27 NOTE — TELEPHONE ENCOUNTER
Syd Gave - patient's denial letter indicated that they needed lab values before and after medication. Can we retry with labs before repatha, after, then not being on it for 1 month (current labs)  Do they cover Praluent?

## 2023-03-27 NOTE — PROGRESS NOTES
Beverley Hall (: 1965) is a 65 y.o. female, established patient, here for evaluation of the following chief complaint(s):  No chief complaint on file. ASSESSMENT/PLAN:  Below is the assessment and plan developed based on review of pertinent history, physical exam, labs, studies, and medications. 1. Dyslipidemia, goal LDL below 100  -     LIPID PANEL; Future  2. Dysuria  -     URINALYSIS W/MICROSCOPIC  -     CULTURE, URINE      An electronic signature was used to authenticate this note.   -- Argenis Castaneda NP

## 2023-04-19 ENCOUNTER — PATIENT MESSAGE (OUTPATIENT)
Dept: FAMILY MEDICINE CLINIC | Age: 58
End: 2023-04-19

## 2023-04-21 NOTE — TELEPHONE ENCOUNTER
Vandana Richmond LPN 9/14/8424 8:63 AM EDT    Please review patient concerns. Thanks.   ----- Message -----  From: Allyssa Cohn"  Sent: 4/19/2023 11:36 AM EDT  To: [facility] Nurse Pool  Subject: Concerning cholesterol medication     Its been a while since Ive heard anything concerning the new cholesterol medication. Wanted to get an update and see what is going on with this. If someone could message me back, that would be so great.

## 2023-05-03 ENCOUNTER — OFFICE VISIT (OUTPATIENT)
Dept: FAMILY MEDICINE CLINIC | Age: 58
End: 2023-05-03

## 2023-05-03 VITALS
BODY MASS INDEX: 24.92 KG/M2 | RESPIRATION RATE: 14 BRPM | DIASTOLIC BLOOD PRESSURE: 58 MMHG | SYSTOLIC BLOOD PRESSURE: 134 MMHG | OXYGEN SATURATION: 97 % | HEART RATE: 82 BPM | HEIGHT: 59 IN | WEIGHT: 123.6 LBS | TEMPERATURE: 97.5 F

## 2023-05-03 DIAGNOSIS — E78.5 DYSLIPIDEMIA, GOAL LDL BELOW 100: Chronic | ICD-10-CM

## 2023-05-03 DIAGNOSIS — M81.0 OSTEOPOROSIS, UNSPECIFIED OSTEOPOROSIS TYPE, UNSPECIFIED PATHOLOGICAL FRACTURE PRESENCE: ICD-10-CM

## 2023-05-03 DIAGNOSIS — R42 DIZZINESS: ICD-10-CM

## 2023-05-03 DIAGNOSIS — Z12.4 CERVICAL CANCER SCREENING: ICD-10-CM

## 2023-05-03 DIAGNOSIS — N81.11 PROLAPSE OF VAGINAL WALL WITH MIDLINE CYSTOCELE: ICD-10-CM

## 2023-05-03 DIAGNOSIS — Z00.00 ROUTINE GENERAL MEDICAL EXAMINATION AT A HEALTH CARE FACILITY: Primary | ICD-10-CM

## 2023-05-03 DIAGNOSIS — J30.9 ALLERGIC RHINITIS, UNSPECIFIED SEASONALITY, UNSPECIFIED TRIGGER: ICD-10-CM

## 2023-05-03 DIAGNOSIS — Z01.419 ENCOUNTER FOR GYNECOLOGICAL EXAMINATION WITHOUT ABNORMAL FINDING: ICD-10-CM

## 2023-05-03 DIAGNOSIS — E55.9 VITAMIN D DEFICIENCY: ICD-10-CM

## 2023-05-03 DIAGNOSIS — K21.9 GASTROESOPHAGEAL REFLUX DISEASE, UNSPECIFIED WHETHER ESOPHAGITIS PRESENT: ICD-10-CM

## 2023-05-03 RX ORDER — OMEPRAZOLE 40 MG/1
CAPSULE, DELAYED RELEASE ORAL
Qty: 90 CAPSULE | Refills: 3 | Status: SHIPPED | OUTPATIENT
Start: 2023-05-03

## 2023-05-03 RX ORDER — ALIROCUMAB 75 MG/ML
75 INJECTION, SOLUTION SUBCUTANEOUS EVERY 2 WEEKS
Qty: 2 ML | Refills: 5 | Status: SHIPPED | OUTPATIENT
Start: 2023-05-03

## 2023-05-03 RX ORDER — ROSUVASTATIN CALCIUM 40 MG/1
40 TABLET, COATED ORAL
Qty: 90 TABLET | Refills: 3 | Status: SHIPPED | OUTPATIENT
Start: 2023-05-03

## 2023-05-03 RX ORDER — DENOSUMAB 60 MG/ML
60 INJECTION SUBCUTANEOUS
COMMUNITY

## 2023-05-04 LAB
25(OH)D3 SERPL-MCNC: 24.9 NG/ML (ref 30–100)
ALBUMIN SERPL-MCNC: 4 G/DL (ref 3.5–5)
ALBUMIN/GLOB SERPL: 1.2 (ref 1.1–2.2)
ALP SERPL-CCNC: 76 U/L (ref 45–117)
ALT SERPL-CCNC: 19 U/L (ref 12–78)
ANION GAP SERPL CALC-SCNC: 2 MMOL/L (ref 5–15)
AST SERPL-CCNC: 16 U/L (ref 15–37)
BILIRUB SERPL-MCNC: 0.5 MG/DL (ref 0.2–1)
BUN SERPL-MCNC: 13 MG/DL (ref 6–20)
BUN/CREAT SERPL: 25 (ref 12–20)
CALCIUM SERPL-MCNC: 9 MG/DL (ref 8.5–10.1)
CHLORIDE SERPL-SCNC: 108 MMOL/L (ref 97–108)
CHOLEST SERPL-MCNC: 284 MG/DL
CO2 SERPL-SCNC: 28 MMOL/L (ref 21–32)
CREAT SERPL-MCNC: 0.53 MG/DL (ref 0.55–1.02)
ERYTHROCYTE [DISTWIDTH] IN BLOOD BY AUTOMATED COUNT: 13.2 % (ref 11.5–14.5)
GLOBULIN SER CALC-MCNC: 3.3 G/DL (ref 2–4)
GLUCOSE SERPL-MCNC: 94 MG/DL (ref 65–100)
HCT VFR BLD AUTO: 45.3 % (ref 35–47)
HDLC SERPL-MCNC: 54 MG/DL
HDLC SERPL: 5.3 (ref 0–5)
HGB BLD-MCNC: 14.1 G/DL (ref 11.5–16)
LDLC SERPL CALC-MCNC: 214.2 MG/DL (ref 0–100)
MCH RBC QN AUTO: 28.3 PG (ref 26–34)
MCHC RBC AUTO-ENTMCNC: 31.1 G/DL (ref 30–36.5)
MCV RBC AUTO: 91 FL (ref 80–99)
NRBC # BLD: 0 K/UL (ref 0–0.01)
NRBC BLD-RTO: 0 PER 100 WBC
PLATELET # BLD AUTO: 248 K/UL (ref 150–400)
PMV BLD AUTO: 11.4 FL (ref 8.9–12.9)
POTASSIUM SERPL-SCNC: 3.8 MMOL/L (ref 3.5–5.1)
PROT SERPL-MCNC: 7.3 G/DL (ref 6.4–8.2)
RBC # BLD AUTO: 4.98 M/UL (ref 3.8–5.2)
SODIUM SERPL-SCNC: 138 MMOL/L (ref 136–145)
TRIGL SERPL-MCNC: 79 MG/DL (ref ?–150)
TSH SERPL DL<=0.05 MIU/L-ACNC: 0.59 UIU/ML (ref 0.36–3.74)
VLDLC SERPL CALC-MCNC: 15.8 MG/DL
WBC # BLD AUTO: 6.2 K/UL (ref 3.6–11)

## 2023-05-10 ENCOUNTER — TRANSCRIBE ORDERS (OUTPATIENT)
Facility: HOSPITAL | Age: 58
End: 2023-05-10

## 2023-05-10 DIAGNOSIS — M81.0 OSTEOPOROSIS, UNSPECIFIED OSTEOPOROSIS TYPE, UNSPECIFIED PATHOLOGICAL FRACTURE PRESENCE: Primary | ICD-10-CM

## 2023-05-12 ENCOUNTER — TELEPHONE (OUTPATIENT)
Age: 58
End: 2023-05-12

## 2023-05-12 ENCOUNTER — CLINICAL DOCUMENTATION (OUTPATIENT)
Age: 58
End: 2023-05-12

## 2023-05-12 RX ORDER — ROSUVASTATIN CALCIUM 40 MG/1
TABLET, COATED ORAL
Qty: 30 TABLET | Refills: 11 | Status: SHIPPED | OUTPATIENT
Start: 2023-05-12

## 2023-05-12 NOTE — TELEPHONE ENCOUNTER
----- Message from JAYLAN Rodriguez NP sent at 5/9/2023  8:28 PM EDT -----  Lab results    Thyroid screen negative. Liver and kidney function normal.  Blood counts normal (not anemic). Cholesterol numbers are high. Have you been able to get Praluent? Vitamin D is a little low. take an over-the-counter Vitamin D supplement 2,000 units daily.

## 2023-05-12 NOTE — TELEPHONE ENCOUNTER
Verified patient with two type of identifiers. Spoke to pt - reviewed lab results per JAYLAN Hubbard NP. Pt verbalized understanding.

## 2023-05-17 ENCOUNTER — TELEPHONE (OUTPATIENT)
Age: 58
End: 2023-05-17

## 2023-05-22 ENCOUNTER — HOSPITAL ENCOUNTER (OUTPATIENT)
Facility: HOSPITAL | Age: 58
Discharge: HOME | End: 2023-05-25

## 2023-05-22 DIAGNOSIS — M81.0 OSTEOPOROSIS, UNSPECIFIED OSTEOPOROSIS TYPE, UNSPECIFIED PATHOLOGICAL FRACTURE PRESENCE: ICD-10-CM

## 2023-05-23 ENCOUNTER — CLINICAL DOCUMENTATION (OUTPATIENT)
Age: 58
End: 2023-05-23

## 2023-05-23 ENCOUNTER — HOSPITAL ENCOUNTER (OUTPATIENT)
Facility: HOSPITAL | Age: 58
Discharge: HOME | End: 2023-05-26
Payer: MEDICAID

## 2023-05-23 PROCEDURE — 77080 DXA BONE DENSITY AXIAL: CPT

## 2023-05-24 ENCOUNTER — CLINICAL DOCUMENTATION (OUTPATIENT)
Age: 58
End: 2023-05-24

## 2023-08-21 ENCOUNTER — HOSPITAL ENCOUNTER (OUTPATIENT)
Facility: HOSPITAL | Age: 58
Setting detail: INFUSION SERIES
End: 2023-08-21
Payer: MEDICAID

## 2023-08-21 VITALS
WEIGHT: 121.7 LBS | DIASTOLIC BLOOD PRESSURE: 74 MMHG | HEIGHT: 59 IN | SYSTOLIC BLOOD PRESSURE: 159 MMHG | TEMPERATURE: 98.7 F | HEART RATE: 80 BPM | BODY MASS INDEX: 24.53 KG/M2 | RESPIRATION RATE: 18 BRPM | OXYGEN SATURATION: 96 %

## 2023-08-21 DIAGNOSIS — M81.0 OSTEOPOROSIS, UNSPECIFIED OSTEOPOROSIS TYPE, UNSPECIFIED PATHOLOGICAL FRACTURE PRESENCE: Primary | ICD-10-CM

## 2023-08-21 LAB
ANION GAP BLD CALC-SCNC: 9 MMOL/L (ref 10–20)
CA-I BLD-MCNC: 1.18 MMOL/L (ref 1.12–1.32)
CHLORIDE BLD-SCNC: 106 MMOL/L (ref 98–107)
CO2 BLD-SCNC: 27.8 MMOL/L (ref 21–32)
CREAT BLD-MCNC: 0.54 MG/DL (ref 0.6–1.3)
GLUCOSE BLD-MCNC: 94 MG/DL (ref 65–100)
MAGNESIUM SERPL-MCNC: 2.3 MG/DL (ref 1.6–2.4)
PHOSPHATE SERPL-MCNC: 4.1 MG/DL (ref 2.6–4.7)
POTASSIUM BLD-SCNC: 4.3 MMOL/L (ref 3.5–5.1)
SERVICE CMNT-IMP: ABNORMAL
SODIUM BLD-SCNC: 142 MMOL/L (ref 136–145)

## 2023-08-21 PROCEDURE — 84100 ASSAY OF PHOSPHORUS: CPT

## 2023-08-21 PROCEDURE — 6360000002 HC RX W HCPCS: Performed by: NURSE PRACTITIONER

## 2023-08-21 PROCEDURE — 96372 THER/PROPH/DIAG INJ SC/IM: CPT

## 2023-08-21 PROCEDURE — 83735 ASSAY OF MAGNESIUM: CPT

## 2023-08-21 PROCEDURE — 36415 COLL VENOUS BLD VENIPUNCTURE: CPT

## 2023-08-21 PROCEDURE — 80047 BASIC METABLC PNL IONIZED CA: CPT

## 2023-08-21 RX ADMIN — DENOSUMAB 60 MG: 60 INJECTION SUBCUTANEOUS at 11:12

## 2023-09-01 ENCOUNTER — OFFICE VISIT (OUTPATIENT)
Age: 58
End: 2023-09-01
Payer: MEDICAID

## 2023-09-01 VITALS
WEIGHT: 120.6 LBS | HEART RATE: 83 BPM | DIASTOLIC BLOOD PRESSURE: 63 MMHG | SYSTOLIC BLOOD PRESSURE: 137 MMHG | RESPIRATION RATE: 18 BRPM | HEIGHT: 59 IN | BODY MASS INDEX: 24.31 KG/M2 | TEMPERATURE: 98.3 F | OXYGEN SATURATION: 96 %

## 2023-09-01 DIAGNOSIS — M05.9 RHEUMATOID ARTHRITIS WITH POSITIVE RHEUMATOID FACTOR, INVOLVING UNSPECIFIED SITE (HCC): ICD-10-CM

## 2023-09-01 DIAGNOSIS — E78.2 MIXED HYPERLIPIDEMIA: Primary | ICD-10-CM

## 2023-09-01 DIAGNOSIS — M81.0 AGE-RELATED OSTEOPOROSIS WITHOUT CURRENT PATHOLOGICAL FRACTURE: ICD-10-CM

## 2023-09-01 DIAGNOSIS — E55.9 VITAMIN D DEFICIENCY, UNSPECIFIED: ICD-10-CM

## 2023-09-01 DIAGNOSIS — L91.8 SKIN TAG: ICD-10-CM

## 2023-09-01 DIAGNOSIS — I10 ESSENTIAL (PRIMARY) HYPERTENSION: ICD-10-CM

## 2023-09-01 DIAGNOSIS — Z11.4 ENCOUNTER FOR SCREENING FOR HIV: ICD-10-CM

## 2023-09-01 LAB
25(OH)D3 SERPL-MCNC: 22.1 NG/ML (ref 30–100)
ANION GAP SERPL CALC-SCNC: 3 MMOL/L (ref 5–15)
BUN SERPL-MCNC: 8 MG/DL (ref 6–20)
BUN/CREAT SERPL: 13 (ref 12–20)
CALCIUM SERPL-MCNC: 9.1 MG/DL (ref 8.5–10.1)
CHLORIDE SERPL-SCNC: 109 MMOL/L (ref 97–108)
CHOLEST SERPL-MCNC: 236 MG/DL
CO2 SERPL-SCNC: 29 MMOL/L (ref 21–32)
CREAT SERPL-MCNC: 0.62 MG/DL (ref 0.55–1.02)
GLUCOSE SERPL-MCNC: 96 MG/DL (ref 65–100)
HDLC SERPL-MCNC: 50 MG/DL
HDLC SERPL: 4.7 (ref 0–5)
HIV 1+2 AB+HIV1 P24 AG SERPL QL IA: NONREACTIVE
HIV 1/2 RESULT COMMENT: NORMAL
LDLC SERPL CALC-MCNC: 157.8 MG/DL (ref 0–100)
POTASSIUM SERPL-SCNC: 3.5 MMOL/L (ref 3.5–5.1)
SODIUM SERPL-SCNC: 141 MMOL/L (ref 136–145)
TRIGL SERPL-MCNC: 141 MG/DL
VLDLC SERPL CALC-MCNC: 28.2 MG/DL

## 2023-09-01 PROCEDURE — 99214 OFFICE O/P EST MOD 30 MIN: CPT | Performed by: NURSE PRACTITIONER

## 2023-09-01 PROCEDURE — 3074F SYST BP LT 130 MM HG: CPT | Performed by: NURSE PRACTITIONER

## 2023-09-01 PROCEDURE — 3078F DIAST BP <80 MM HG: CPT | Performed by: NURSE PRACTITIONER

## 2023-09-01 SDOH — ECONOMIC STABILITY: FOOD INSECURITY: WITHIN THE PAST 12 MONTHS, THE FOOD YOU BOUGHT JUST DIDN'T LAST AND YOU DIDN'T HAVE MONEY TO GET MORE.: NEVER TRUE

## 2023-09-01 SDOH — ECONOMIC STABILITY: INCOME INSECURITY: HOW HARD IS IT FOR YOU TO PAY FOR THE VERY BASICS LIKE FOOD, HOUSING, MEDICAL CARE, AND HEATING?: NOT HARD AT ALL

## 2023-09-01 SDOH — ECONOMIC STABILITY: FOOD INSECURITY: WITHIN THE PAST 12 MONTHS, YOU WORRIED THAT YOUR FOOD WOULD RUN OUT BEFORE YOU GOT MONEY TO BUY MORE.: NEVER TRUE

## 2023-09-01 SDOH — ECONOMIC STABILITY: HOUSING INSECURITY
IN THE LAST 12 MONTHS, WAS THERE A TIME WHEN YOU DID NOT HAVE A STEADY PLACE TO SLEEP OR SLEPT IN A SHELTER (INCLUDING NOW)?: NO

## 2023-09-01 ASSESSMENT — PATIENT HEALTH QUESTIONNAIRE - PHQ9
1. LITTLE INTEREST OR PLEASURE IN DOING THINGS: 0
SUM OF ALL RESPONSES TO PHQ QUESTIONS 1-9: 0
2. FEELING DOWN, DEPRESSED OR HOPELESS: 0
SUM OF ALL RESPONSES TO PHQ9 QUESTIONS 1 & 2: 0

## 2023-09-01 NOTE — PROGRESS NOTES
Chief Complaint   Patient presents with    Follow-up       1. \"Have you been to the ER, urgent care clinic since your last visit? Hospitalized since your last visit? \" No    2. \"Have you seen or consulted any other health care providers outside of the [address] [place] [address] since your last visit? \" No    3. For patients aged 43-73: Has the patient had a colonoscopy / FIT/ Cologuard? Yes      If the patient is female:    4. For patients aged 43-66: Has the patient had a mammogram within the past 2 years? Yes      5. For patients aged 21-65: Has the patient had a pap smear?  Yes     Health Maintenance Due   Topic Date Due    HIV screen  Never done    Shingles vaccine (1 of 2) Never done    Colorectal Cancer Screen  04/11/2021    COVID-19 Vaccine (4 - Booster for Pfizer series) 02/09/2022    Flu vaccine (1) 08/01/2023

## 2023-09-01 NOTE — PROGRESS NOTES
Constanza Mathis (:  1965) is a 65 y.o. female,Established patient, here for evaluation of the following chief complaint(s):  Follow-up         ASSESSMENT/PLAN:  1. Mixed hyperlipidemia - has been off Repatha due to insurance issues. Will see if can get approved. -     Lipid Panel; Future  -     Basic Metabolic Panel; Future  2. Essential (primary) hypertension - stable. 3. Skin tag right upper arm and under right breast.  -     Liquid nitrogen therapy - visibly frozen area included lesion and ~2 mm of the surrounding normal tissue and the frozen appearance disappeared less than 30 seconds after application  4. Vitamin D deficiency, unspecified  -     Vitamin D 25 Hydroxy; Future  5. Age-related osteoporosis without current pathological fracture - just had Prolia injection 1-2 weeks ago. Not due for bone density until May 2025  -     Vitamin D 25 Hydroxy; Future  6. Rheumatoid arthritis with positive rheumatoid factor, involving unspecified site [facility]) - per rheumatology  7. Encounter for screening for HIV  -     HIV 1/2 Ag/Ab, 4TH Generation,W Rflx Confirm; Future      Return in about 4 months (around 2024). Subjective   SUBJECTIVE/OBJECTIVE:  HPI  patient come in today for follow up chol, HTN     HTN - Taking amlodipine. Tolerating medication. Denies chest pains, palpitations, dyspnea. occasional dizziness. Per cardiology - Echo OK. Aortic valve mildly blocked; fu one year      in May 2023 and 146 in 2023 being off [address]. Was doing Repatha for cholesterol, but insurance will not cover. Last LDL 73 in 2022, 76 in Oct 2021, 63 in 2021, unchanged from 58  in 2020, down from 70 in 2020. Her LDL prior to [address] highest reading 383. Taking Crestor 40mg. Had Carotid doppler in Oct 2022: There is mild stenosis in the right ICA (<50%). There is mild stenosis in the left ICA (<50%). Hx osteoporosis -   Had Prolia injection 23.   She needs to

## 2023-09-04 ASSESSMENT — ENCOUNTER SYMPTOMS
RESPIRATORY NEGATIVE: 1
GASTROINTESTINAL NEGATIVE: 1

## 2023-09-11 ENCOUNTER — PATIENT MESSAGE (OUTPATIENT)
Age: 58
End: 2023-09-11

## 2023-09-11 DIAGNOSIS — E78.2 MIXED HYPERLIPIDEMIA: Primary | ICD-10-CM

## 2023-09-13 RX ORDER — EVOLOCUMAB 140 MG/ML
INJECTION, SOLUTION SUBCUTANEOUS
Qty: 2 ML | Refills: 5 | Status: SHIPPED | OUTPATIENT
Start: 2023-09-13

## 2023-09-15 ENCOUNTER — CLINICAL DOCUMENTATION (OUTPATIENT)
Age: 58
End: 2023-09-15

## 2023-11-20 RX ORDER — BENZONATATE 200 MG/1
CAPSULE ORAL
Qty: 21 CAPSULE | Refills: 0 | Status: SHIPPED | OUTPATIENT
Start: 2023-11-20

## 2023-11-20 RX ORDER — AMLODIPINE BESYLATE 10 MG/1
TABLET ORAL
Qty: 90 TABLET | Refills: 3 | Status: SHIPPED | OUTPATIENT
Start: 2023-11-20

## 2023-11-20 NOTE — TELEPHONE ENCOUNTER
Last appointment: 9/1/23  Next appointment: 1/11/24  Previous refill encounter(s): 1/24/23 #21    Requested Prescriptions     Pending Prescriptions Disp Refills    benzonatate (TESSALON) 200 MG capsule [Pharmacy Med Name: BENZONATATE 200 MG CAPSULE] 21 capsule 0     Sig: TAKE ONE CAPSULE BY MOUTH THREE TIMES A DAY AS NEEDED FOR COUGH FOR UP TO 7 DAYS         For Pharmacy Admin Tracking Only    Program: Medication Refill  CPA in place:    Recommendation Provided To:    Intervention Detail: New Rx: 1, reason: Patient Preference  Intervention Accepted By:   Srinivas Neal Closed?:    Time Spent (min): 5

## 2023-11-20 NOTE — TELEPHONE ENCOUNTER
Last appointment: 9/1/23  Next appointment: 1/11/24  Previous refill encounter(s): 12/11/22    Requested Prescriptions     Pending Prescriptions Disp Refills    amLODIPine (NORVASC) 10 MG tablet [Pharmacy Med Name: AMLODIPINE 10 MG TAB[*]] 90 tablet 3     Sig: TAKE ONE TABLET BY MOUTH ONE TIME DAILY         For Pharmacy Admin Tracking Only    Program: Medication Refill  CPA in place:    Recommendation Provided To:    Intervention Detail: New Rx: 1, reason: Patient Preference  Intervention Accepted By:   Prisca Ulloa Closed?:    Time Spent (min): 5

## 2024-01-11 ENCOUNTER — OFFICE VISIT (OUTPATIENT)
Age: 59
End: 2024-01-11
Payer: MEDICAID

## 2024-01-11 VITALS
TEMPERATURE: 98.1 F | HEIGHT: 59 IN | RESPIRATION RATE: 20 BRPM | WEIGHT: 112.6 LBS | SYSTOLIC BLOOD PRESSURE: 126 MMHG | DIASTOLIC BLOOD PRESSURE: 69 MMHG | HEART RATE: 72 BPM | OXYGEN SATURATION: 96 % | BODY MASS INDEX: 22.7 KG/M2

## 2024-01-11 DIAGNOSIS — I10 ESSENTIAL (PRIMARY) HYPERTENSION: Primary | ICD-10-CM

## 2024-01-11 DIAGNOSIS — Z12.31 BREAST CANCER SCREENING BY MAMMOGRAM: ICD-10-CM

## 2024-01-11 DIAGNOSIS — G60.0 CMT (CHARCOT-MARIE-TOOTH DISEASE): ICD-10-CM

## 2024-01-11 DIAGNOSIS — I65.23 CAROTID STENOSIS, BILATERAL: ICD-10-CM

## 2024-01-11 DIAGNOSIS — E78.2 MIXED HYPERLIPIDEMIA: ICD-10-CM

## 2024-01-11 DIAGNOSIS — Z79.899 ENCOUNTER FOR LONG-TERM (CURRENT) USE OF MEDICATIONS: ICD-10-CM

## 2024-01-11 DIAGNOSIS — M05.9 RHEUMATOID ARTHRITIS WITH POSITIVE RHEUMATOID FACTOR, INVOLVING UNSPECIFIED SITE (HCC): ICD-10-CM

## 2024-01-11 DIAGNOSIS — M81.0 AGE-RELATED OSTEOPOROSIS WITHOUT CURRENT PATHOLOGICAL FRACTURE: ICD-10-CM

## 2024-01-11 DIAGNOSIS — E55.9 VITAMIN D DEFICIENCY, UNSPECIFIED: ICD-10-CM

## 2024-01-11 DIAGNOSIS — I35.0 AORTIC VALVE STENOSIS, ETIOLOGY OF CARDIAC VALVE DISEASE UNSPECIFIED: ICD-10-CM

## 2024-01-11 LAB
25(OH)D3 SERPL-MCNC: 31.1 NG/ML (ref 30–100)
ALBUMIN SERPL-MCNC: 3.7 G/DL (ref 3.5–5)
ALBUMIN/GLOB SERPL: 1.2 (ref 1.1–2.2)
ALP SERPL-CCNC: 61 U/L (ref 45–117)
ALT SERPL-CCNC: 20 U/L (ref 12–78)
ANION GAP SERPL CALC-SCNC: 5 MMOL/L (ref 5–15)
AST SERPL-CCNC: 14 U/L (ref 15–37)
BILIRUB SERPL-MCNC: 0.4 MG/DL (ref 0.2–1)
BUN SERPL-MCNC: 10 MG/DL (ref 6–20)
BUN/CREAT SERPL: 21 (ref 12–20)
CALCIUM SERPL-MCNC: 8.3 MG/DL (ref 8.5–10.1)
CHLORIDE SERPL-SCNC: 109 MMOL/L (ref 97–108)
CHOLEST SERPL-MCNC: 148 MG/DL
CO2 SERPL-SCNC: 29 MMOL/L (ref 21–32)
CREAT SERPL-MCNC: 0.48 MG/DL (ref 0.55–1.02)
ERYTHROCYTE [DISTWIDTH] IN BLOOD BY AUTOMATED COUNT: 13.1 % (ref 11.5–14.5)
GLOBULIN SER CALC-MCNC: 3 G/DL (ref 2–4)
GLUCOSE SERPL-MCNC: 93 MG/DL (ref 65–100)
HCT VFR BLD AUTO: 41 % (ref 35–47)
HDLC SERPL-MCNC: 58 MG/DL
HDLC SERPL: 2.6 (ref 0–5)
HGB BLD-MCNC: 13.5 G/DL (ref 11.5–16)
LDLC SERPL CALC-MCNC: 74.4 MG/DL (ref 0–100)
MCH RBC QN AUTO: 29.1 PG (ref 26–34)
MCHC RBC AUTO-ENTMCNC: 32.9 G/DL (ref 30–36.5)
MCV RBC AUTO: 88.4 FL (ref 80–99)
NRBC # BLD: 0 K/UL (ref 0–0.01)
NRBC BLD-RTO: 0 PER 100 WBC
PLATELET # BLD AUTO: 248 K/UL (ref 150–400)
PMV BLD AUTO: 10.9 FL (ref 8.9–12.9)
POTASSIUM SERPL-SCNC: 3.5 MMOL/L (ref 3.5–5.1)
PROT SERPL-MCNC: 6.7 G/DL (ref 6.4–8.2)
RBC # BLD AUTO: 4.64 M/UL (ref 3.8–5.2)
SODIUM SERPL-SCNC: 143 MMOL/L (ref 136–145)
TRIGL SERPL-MCNC: 78 MG/DL
VLDLC SERPL CALC-MCNC: 15.6 MG/DL
WBC # BLD AUTO: 7.6 K/UL (ref 3.6–11)

## 2024-01-11 PROCEDURE — 99214 OFFICE O/P EST MOD 30 MIN: CPT | Performed by: NURSE PRACTITIONER

## 2024-01-11 PROCEDURE — 3074F SYST BP LT 130 MM HG: CPT | Performed by: NURSE PRACTITIONER

## 2024-01-11 PROCEDURE — 3078F DIAST BP <80 MM HG: CPT | Performed by: NURSE PRACTITIONER

## 2024-01-11 RX ORDER — CICLOPIROX 7.7 MG/G
1 GEL TOPICAL 2 TIMES DAILY
COMMUNITY
Start: 2023-12-08

## 2024-01-11 ASSESSMENT — PATIENT HEALTH QUESTIONNAIRE - PHQ9
SUM OF ALL RESPONSES TO PHQ QUESTIONS 1-9: 0
SUM OF ALL RESPONSES TO PHQ QUESTIONS 1-9: 0
SUM OF ALL RESPONSES TO PHQ9 QUESTIONS 1 & 2: 0
1. LITTLE INTEREST OR PLEASURE IN DOING THINGS: 0
SUM OF ALL RESPONSES TO PHQ QUESTIONS 1-9: 0
2. FEELING DOWN, DEPRESSED OR HOPELESS: 0
SUM OF ALL RESPONSES TO PHQ QUESTIONS 1-9: 0

## 2024-01-11 NOTE — PROGRESS NOTES
Dora Copeland (:  1965) is a 58 y.o. female,Established patient, here for evaluation of the following chief complaint(s):  4 month  follow up         ASSESSMENT/PLAN:  1. Essential (primary) hypertension - stable.  Continue amlodipine 10mg  -     Comprehensive Metabolic Panel; Future  -     CBC; Future  2. Mixed hyperlipidemia - has been back on Repatha since 2023.  Also taking Crestor 40mg.  Check FLP  -     Lipid Panel; Future  -     Comprehensive Metabolic Panel; Future  3. Age-related osteoporosis without current pathological fracture - taking Prolia every 6 months.  DEXA last done in May 2023  4. Rheumatoid arthritis with positive rheumatoid factor, involving unspecified site (HCC)  5. CMT (Charcot-Kamini-Tooth disease)  6. Vitamin D deficiency, unspecified  -     Vitamin D 25 Hydroxy; Future  7. Aortic valve stenosis, etiology of cardiac valve disease unspecified - followed by cardiology, appt next month  8. Carotid stenosis, bilateral  Had Carotid doppler in Oct 2022:  There is mild stenosis in the right ICA (<50%).  There is mild stenosis in the left ICA (<50%).  9. Encounter for long-term (current) use of medications  10. Breast cancer screening by mammogram  -     [facility] SOHEILA DIGITAL SCREEN BILATERAL; Future      Return in about 4 months (around 2024).         Subjective   SUBJECTIVE/OBJECTIVE:  HPI  patient come in today for follow up chol, HTN    Has not scheduled with neuro at [facility], wants to see same neuro that daughter did for CMT,   Due for Prolia next month.     HTN - Taking amlodipine 10mg.  Tolerating medication.  Denies chest pains, palpitations, dyspnea. occasional dizziness.  Per cardiology - Echo OK.  Aortic valve mildly blocked; follows with cardiology once yearly.  Has appt 2024     Repatha finally approved with insurance in September.   in 2023 off repatha 214 in May 2023 and 146 in 2023 being off Repatha.  Was doing Repatha for cholesterol, but insurance

## 2024-01-11 NOTE — PROGRESS NOTES
Chief Complaint   Patient presents with    4 month  follow up       1. \"Have you been to the ER, urgent care clinic since your last visit?  Hospitalized since your last visit?\" No    2. \"Have you seen or consulted any other health care providers outside of the [facility] since your last visit?\" Yes last seen 1 year ago [facility] for  Heart Condition. Also Foot Achilles Lucia Shoemaker seen this month for Foot pain.    3. For patients aged 45-75: Has the patient had a colonoscopy / FIT/ Cologuard? Yes      If the patient is female:    4. For patients aged 40-74: Has the patient had a mammogram within the past 2 years? Yes       5. For patients aged 21-65: Has the patient had a pap smear? Yes       The patient, Dora Cuellar, identity was verified by name and     Health Maintenance Due   Topic Date Due    Hepatitis B vaccine (1 of 3 - 3-dose series) Never done    Shingles vaccine (1 of 2) Never done    Colorectal Cancer Screen  2021    Flu vaccine (1) 2023    COVID-19 Vaccine ( season) 2023

## 2024-02-05 ENCOUNTER — PATIENT MESSAGE (OUTPATIENT)
Age: 59
End: 2024-02-05

## 2024-02-05 DIAGNOSIS — N30.90 CYSTITIS: Primary | ICD-10-CM

## 2024-02-06 RX ORDER — NITROFURANTOIN 25; 75 MG/1; MG/1
100 CAPSULE ORAL 2 TIMES DAILY
Qty: 14 CAPSULE | Refills: 0 | Status: SHIPPED | OUTPATIENT
Start: 2024-02-06 | End: 2024-02-13

## 2024-02-06 NOTE — TELEPHONE ENCOUNTER
From: Dora Copeland  To: Angelina Herbert  Sent: 2/5/2024 5:53 PM EST  Subject: Urinary Infection     I have had really bad burning when I urinate ,feels like an infection , could something be called in or would I have to come up there and have a urine test?Thanks for your help…Mary

## 2024-02-15 DIAGNOSIS — M81.0 OSTEOPOROSIS, UNSPECIFIED OSTEOPOROSIS TYPE, UNSPECIFIED PATHOLOGICAL FRACTURE PRESENCE: Primary | ICD-10-CM

## 2024-02-19 ENCOUNTER — HOSPITAL ENCOUNTER (OUTPATIENT)
Facility: HOSPITAL | Age: 59
Setting detail: INFUSION SERIES
Discharge: HOME | End: 2024-02-19

## 2024-02-19 VITALS — WEIGHT: 110.8 LBS | BODY MASS INDEX: 22.38 KG/M2

## 2024-02-19 DIAGNOSIS — M81.0 OSTEOPOROSIS, UNSPECIFIED OSTEOPOROSIS TYPE, UNSPECIFIED PATHOLOGICAL FRACTURE PRESENCE: ICD-10-CM

## 2024-02-19 NOTE — PROGRESS NOTES
Patient is having 5 teeth extracted as soon as [facility] can take her, 3 of which are wisdom teeth. She is going to call back and reschedule her Prolia apt as soon as she knows her dental apt date.

## 2024-02-22 ENCOUNTER — HOSPITAL ENCOUNTER (OUTPATIENT)
Facility: HOSPITAL | Age: 59
Discharge: HOME | End: 2024-02-22
Payer: MEDICAID

## 2024-02-22 VITALS — BODY MASS INDEX: 22.18 KG/M2 | WEIGHT: 110 LBS | HEIGHT: 59 IN

## 2024-02-22 DIAGNOSIS — Z12.31 BREAST CANCER SCREENING BY MAMMOGRAM: ICD-10-CM

## 2024-02-22 PROCEDURE — 77063 BREAST TOMOSYNTHESIS BI: CPT

## 2024-03-16 DIAGNOSIS — E78.2 MIXED HYPERLIPIDEMIA: ICD-10-CM

## 2024-03-19 RX ORDER — EVOLOCUMAB 140 MG/ML
INJECTION, SOLUTION SUBCUTANEOUS
Qty: 3 ML | Refills: 0 | Status: SHIPPED | OUTPATIENT
Start: 2024-03-19

## 2024-03-21 ENCOUNTER — OFFICE VISIT (OUTPATIENT)
Age: 59
End: 2024-03-21
Payer: MEDICAID

## 2024-03-21 VITALS
TEMPERATURE: 98.1 F | OXYGEN SATURATION: 97 % | SYSTOLIC BLOOD PRESSURE: 144 MMHG | HEIGHT: 59 IN | DIASTOLIC BLOOD PRESSURE: 65 MMHG | BODY MASS INDEX: 22.5 KG/M2 | WEIGHT: 111.6 LBS | HEART RATE: 86 BPM | RESPIRATION RATE: 18 BRPM

## 2024-03-21 DIAGNOSIS — I10 ESSENTIAL (PRIMARY) HYPERTENSION: Primary | ICD-10-CM

## 2024-03-21 DIAGNOSIS — R07.89 CHEST WALL PAIN: ICD-10-CM

## 2024-03-21 DIAGNOSIS — M05.9 RHEUMATOID ARTHRITIS WITH POSITIVE RHEUMATOID FACTOR, INVOLVING UNSPECIFIED SITE (HCC): ICD-10-CM

## 2024-03-21 DIAGNOSIS — R22.32 MASS OF HAND, LEFT: ICD-10-CM

## 2024-03-21 DIAGNOSIS — M81.0 AGE-RELATED OSTEOPOROSIS WITHOUT CURRENT PATHOLOGICAL FRACTURE: ICD-10-CM

## 2024-03-21 PROCEDURE — 3078F DIAST BP <80 MM HG: CPT | Performed by: FAMILY MEDICINE

## 2024-03-21 PROCEDURE — 3077F SYST BP >= 140 MM HG: CPT | Performed by: FAMILY MEDICINE

## 2024-03-21 PROCEDURE — 99213 OFFICE O/P EST LOW 20 MIN: CPT | Performed by: FAMILY MEDICINE

## 2024-03-21 RX ORDER — TERBINAFINE HYDROCHLORIDE 250 MG/1
TABLET ORAL
COMMUNITY
Start: 2024-03-05 | End: 2024-03-21 | Stop reason: CLARIF

## 2024-03-21 ASSESSMENT — PATIENT HEALTH QUESTIONNAIRE - PHQ9
SUM OF ALL RESPONSES TO PHQ9 QUESTIONS 1 & 2: 0
1. LITTLE INTEREST OR PLEASURE IN DOING THINGS: NOT AT ALL
2. FEELING DOWN, DEPRESSED OR HOPELESS: NOT AT ALL
SUM OF ALL RESPONSES TO PHQ QUESTIONS 1-9: 0

## 2024-03-21 ASSESSMENT — ENCOUNTER SYMPTOMS: ABDOMINAL PAIN: 0

## 2024-03-21 NOTE — PROGRESS NOTES
Subjective:      Patient ID: Dora Copeland is a 58 y.o. female.c/o new l inframamary chest wall pain for several weeks,painfull to deep breathe or cough.C/o mildly tender dorsal l hand mass no apparent injury.Has upcoming ortho and [facility]um appts [facility] for [facility] primarily affecting hands    Hand Pain   The incident occurred more than 1 week ago. There was no injury mechanism. The pain is present in the left hand, right fingers, left wrist, right hand and left fingers. The quality of the pain is described as aching. Associated symptoms include chest pain.   Cyst  This is a new problem. The current episode started more than 1 month ago. The problem has been unchanged. Associated symptoms include chest pain. Pertinent negatives include no abdominal pain.   Chest Pain   This is a new problem. The current episode started 1 to 4 weeks ago. The onset quality is sudden. The problem occurs daily. The problem has been unchanged. The pain is present in the lateral region. The pain is at a severity of 4/10. The pain is moderate. The quality of the pain is described as sharp. The pain does not radiate. Pertinent negatives include no abdominal pain or irregular heartbeat.     Review of Systems   Cardiovascular:  Positive for chest pain.   Gastrointestinal:  Negative for abdominal pain.     Objective:   Physical Exam  Constitutional:       Appearance: Normal appearance.   HENT:      Head: Normocephalic and atraumatic.   Cardiovascular:      Rate and Rhythm: Normal rate and regular rhythm.      Pulses: Normal pulses.      Heart sounds: Normal heart sounds.   Pulmonary:      Effort: Pulmonary effort is normal.      Breath sounds: Normal breath sounds.      Comments: Mild l inframmary chest wall tenderness without deformity  Chest:      Chest wall: Tenderness present.   Musculoskeletal:      Comments: Movable cystic mass dorsal l hand,1.5 cm   Skin:     General: Skin is warm and dry.   Neurological:      Mental Status: She is alert.

## 2024-03-21 NOTE — PROGRESS NOTES
Chief Complaint   Patient presents with    Hand Pain     Patient is here today for a large knot on her left hand that has gotten bigger over the last 2 weeks.      1. Have you been to the ER, urgent care clinic since your last visit?  Hospitalized since your last visit?No    2. Have you seen or consulted any other health care providers outside of the [facility] since your last visit?  Include any pap smears or colon screening. No

## 2024-04-03 ENCOUNTER — CLINICAL DOCUMENTATION (OUTPATIENT)
Age: 59
End: 2024-04-03

## 2024-04-23 RX ORDER — ROSUVASTATIN CALCIUM 40 MG/1
TABLET, COATED ORAL
Qty: 90 TABLET | Refills: 0 | Status: SHIPPED | OUTPATIENT
Start: 2024-04-23

## 2024-04-23 NOTE — TELEPHONE ENCOUNTER
Last appointment: 3/21/24  Next appointment: 9/25/24  Previous refill encounter(s): 5/12/23 #30 with 11 refills    Requested Prescriptions     Pending Prescriptions Disp Refills    rosuvastatin (CRESTOR) 40 MG tablet [Pharmacy Med Name: ROSUVASTATIN 40 MG TAB[*]] 90 tablet 0     Sig: TAKE ONE TABLET BY MOUTH ONE TIME DAILY AT BEDTIME         For Pharmacy Admin Tracking Only    Program: Medication Refill  CPA in place:    Recommendation Provided To:   Intervention Detail: New Rx: 1, reason: Patient Preference  Intervention Accepted By:   Gap Closed?:    Time Spent (min): 5

## 2024-04-25 DIAGNOSIS — E78.2 MIXED HYPERLIPIDEMIA: ICD-10-CM

## 2024-04-25 RX ORDER — EVOLOCUMAB 140 MG/ML
INJECTION, SOLUTION SUBCUTANEOUS
Qty: 3 ML | Refills: 0 | Status: SHIPPED | OUTPATIENT
Start: 2024-04-25

## 2024-04-25 NOTE — TELEPHONE ENCOUNTER
Last appointment: 3/21/24  Next appointment: 9/25/24  Previous refill encounter(s): 3/19/24 #3ml    Requested Prescriptions     Pending Prescriptions Disp Refills    Evolocumab (REPATHA SURECLICK) 140 MG/ML SOAJ [Pharmacy Med Name: REPATHA 140 MG/ML SURECLICK[**^R]] 3 mL 0     Sig: INJECT 140 MG UNDER THE SKIN EVERY TWO WEEKS INTO THE THIGH, STOMACH, OR UPPER ARM         For Pharmacy Admin Tracking Only    Program: Medication Refill  CPA in place:    Recommendation Provided To:   Intervention Detail: New Rx: 1, reason: Patient Preference  Intervention Accepted By:   Gap Closed?:    Time Spent (min): 5

## 2024-05-26 DIAGNOSIS — E78.2 MIXED HYPERLIPIDEMIA: ICD-10-CM

## 2024-05-29 RX ORDER — EVOLOCUMAB 140 MG/ML
INJECTION, SOLUTION SUBCUTANEOUS
Qty: 6 ML | Refills: 0 | Status: SHIPPED | OUTPATIENT
Start: 2024-05-29

## 2024-08-05 RX ORDER — FAMOTIDINE 40 MG/1
40 TABLET, FILM COATED ORAL NIGHTLY
COMMUNITY
Start: 2024-05-16

## 2024-08-05 RX ORDER — DENOSUMAB 60 MG/ML
60 INJECTION SUBCUTANEOUS
COMMUNITY

## 2024-08-05 RX ORDER — IBUPROFEN 600 MG/1
600 TABLET ORAL EVERY 8 HOURS PRN
COMMUNITY
Start: 2024-04-29

## 2024-08-05 NOTE — PERIOP NOTE
Spoke to Stefania at [facility], requested office notes and any testing faxed to [facility]

## 2024-08-05 NOTE — PERIOP NOTE
[facility]  Ambulatory Surgery Unit  [address] [place] [address]  [address]  Pre-operative Instructions    Surgery/Procedure Date  Tuesday 8/20            Tentative Arrival Time TBD      1. On the day of your surgery/procedure, please report to the Ambulatory Surgery Unit Registration Desk and sign in at your designated time. The Ambulatory Surgery Unit is located in [facility] on the [facility] side of the [facility] across from the [facility]. Please have all of your health insurance cards, co-payment, and a photo ID.    **TWO adults may accompany you the day of the procedure.  We have limited seating available.      2. You cannot be dropped off for surgery.  Please make arrangements for a responsible adult friend or family member to remain on the hospital campus during your procedure, and drive you home, as you should not drive for 24 hours following anesthesia. Make arrangements for a responsible adult to stay with you for at least the first 24 hours after your surgery.    3. Do not have anything to eat or drink (including water, gum, mints, coffee, juice) after 11:59 PM on Monday 8/19. This may not apply to medications prescribed by your physician.  (Please note below the special instructions with medications to take the morning of surgery, if applicable.)    4. We recommend you do not drink any alcoholic beverages for 24 hours before and after your surgery.    5. Contact your surgeon’s office for instructions on the following medications: non-steroidal anti-inflammatory drugs (i.e. Advil, Aleve), vitamins, and supplements. (Some surgeon’s will want you to stop these medications prior to surgery and others may allow you to take them)   **If you are currently taking Plavix, Coumadin, Aspirin and/or other blood-thinning agents, contact your surgeon for instructions.** Your surgeon will partner with the physician prescribing these medications to determine if it is

## 2024-08-18 DIAGNOSIS — E78.2 MIXED HYPERLIPIDEMIA: ICD-10-CM

## 2024-08-19 ENCOUNTER — HOSPITAL ENCOUNTER (OUTPATIENT)
Facility: HOSPITAL | Age: 59
Setting detail: INFUSION SERIES
Discharge: HOME | End: 2024-08-19
Payer: MEDICAID

## 2024-08-19 ENCOUNTER — ANESTHESIA EVENT (OUTPATIENT)
Facility: HOSPITAL | Age: 59
End: 2024-08-19
Payer: MEDICAID

## 2024-08-19 VITALS
WEIGHT: 110.5 LBS | BODY MASS INDEX: 22.28 KG/M2 | SYSTOLIC BLOOD PRESSURE: 124 MMHG | RESPIRATION RATE: 16 BRPM | OXYGEN SATURATION: 96 % | HEART RATE: 87 BPM | TEMPERATURE: 98.2 F | HEIGHT: 59 IN | DIASTOLIC BLOOD PRESSURE: 75 MMHG

## 2024-08-19 DIAGNOSIS — M81.0 OSTEOPOROSIS, UNSPECIFIED OSTEOPOROSIS TYPE, UNSPECIFIED PATHOLOGICAL FRACTURE PRESENCE: Primary | ICD-10-CM

## 2024-08-19 LAB
ANION GAP BLD CALC-SCNC: 10.8 MMOL/L (ref 10–20)
CA-I BLD-MCNC: 1.21 MMOL/L (ref 1.12–1.32)
CHLORIDE BLD-SCNC: 106 MMOL/L (ref 98–107)
CO2 BLD-SCNC: 26.2 MMOL/L (ref 21–32)
CREAT BLD-MCNC: 0.58 MG/DL (ref 0.6–1.3)
GLUCOSE BLD-MCNC: 110 MG/DL (ref 70–110)
PHOSPHATE SERPL-MCNC: 3.9 MG/DL (ref 2.6–4.7)
POTASSIUM BLD-SCNC: 3.5 MMOL/L (ref 3.5–5.1)
SERVICE CMNT-IMP: ABNORMAL
SODIUM BLD-SCNC: 143 MMOL/L (ref 136–145)

## 2024-08-19 PROCEDURE — 84100 ASSAY OF PHOSPHORUS: CPT

## 2024-08-19 PROCEDURE — 80047 BASIC METABLC PNL IONIZED CA: CPT

## 2024-08-19 PROCEDURE — 96372 THER/PROPH/DIAG INJ SC/IM: CPT

## 2024-08-19 PROCEDURE — 36415 COLL VENOUS BLD VENIPUNCTURE: CPT

## 2024-08-19 PROCEDURE — 6360000002 HC RX W HCPCS: Performed by: NURSE PRACTITIONER

## 2024-08-19 RX ORDER — ROSUVASTATIN CALCIUM 40 MG/1
TABLET, COATED ORAL
Qty: 90 TABLET | Refills: 1 | Status: SHIPPED | OUTPATIENT
Start: 2024-08-19

## 2024-08-19 RX ORDER — AMLODIPINE BESYLATE 10 MG/1
TABLET ORAL
Qty: 90 TABLET | Refills: 1 | Status: SHIPPED | OUTPATIENT
Start: 2024-08-19

## 2024-08-19 RX ORDER — EVOLOCUMAB 140 MG/ML
INJECTION, SOLUTION SUBCUTANEOUS
Qty: 6 ML | Refills: 1 | Status: SHIPPED | OUTPATIENT
Start: 2024-08-19

## 2024-08-19 RX ADMIN — DENOSUMAB 60 MG: 60 INJECTION SUBCUTANEOUS at 10:13

## 2024-08-19 NOTE — TELEPHONE ENCOUNTER
Last appointment: 3/21/24  Next appointment: 9/25/24  Previous refill encounter(s): 4/23/24 Crestor #90, 5/29/24 Repatha #6ml    Requested Prescriptions     Pending Prescriptions Disp Refills    rosuvastatin (CRESTOR) 40 MG tablet [Pharmacy Med Name: ROSUVASTATIN 40 MG TAB[*]] 90 tablet 1     Sig: TAKE ONE TABLET BY MOUTH EVERY EVENING AT BEDTIME    Evolocumab (REPATHA SURECLICK) 140 MG/ML SOAJ 6 mL 1     Sig: INJECT 140 MG UNDER THE SKIN EVERY TWO WEEKS INTO THE THIGH, STOMACH, OR UPPER ARM         For Pharmacy Admin Tracking Only    Program: Medication Refill  CPA in place:    Recommendation Provided To:   Intervention Detail: New Rx: 2, reason: Patient Preference  Intervention Accepted By:   Gap Closed?:    Time Spent (min): 5

## 2024-08-19 NOTE — TELEPHONE ENCOUNTER
Last appointment: 3/21/24  Next appointment: 9/25/24  Previous refill encounter(s): 11/20/23 #90 with 3 refills    Requested Prescriptions     Pending Prescriptions Disp Refills    amLODIPine (NORVASC) 10 MG tablet [Pharmacy Med Name: AMLODIPINE 10 MG TAB[*]] 90 tablet 1     Sig: TAKE ONE TABLET BY MOUTH ONE TIME DAILY         For Pharmacy Admin Tracking Only    Program: Medication Refill  CPA in place:    Recommendation Provided To:   Intervention Detail: New Rx: 1, reason: Patient Preference  Intervention Accepted By:   Gap Closed?:    Time Spent (min): 5

## 2024-08-19 NOTE — PROGRESS NOTES
[facility] Visit Note:  Arrived ambulatory for Prolia injection. Assessment unremarkable, patient has no concerns at this time. Reviewed Prolia info. Pt denies any recent or upcoming dental work. Calcium resulted at 1.21 indicating OKTT with prolia today.       /75   Pulse 87   Temp 98.2 °F (36.8 °C) (Temporal)   Resp 16   Ht 1.499 m (4' 11\")   Wt 50.1 kg (110 lb 8 oz)   SpO2 96%   BMI 22.32 kg/m²       Labs obtained:   Recent Results (from the past 12 hour(s))   POC CHEM 8    Collection Time: 08/19/24 10:06 AM   Result Value Ref Range    POC Ionized Calcium 1.21 1.12 - 1.32 mmol/L    POC Sodium 143 136 - 145 mmol/L    POC Potassium 3.5 3.5 - 5.1 mmol/L    POC Chloride 106 98 - 107 mmol/L    POC TCO2 26.2 21 - 32 mmol/L    Anion Gap, POC 10.8 10 - 20 mmol/L    POC Glucose 110 70 - 110 mg/dL    POC Creatinine 0.58 (L) 0.6 - 1.3 mg/dL    eGFR, POC >90 >60 ml/min/1.73m2    UA Comment Comment Not Indicated.         Medication given:  Prolia 60mg SQ in left arm    Tolerated well. No reaction noted. Reviewed Prolia D/C instructions. Verbalized understanding. Pt denies any acute problems/changes. Discharged from [facility] ambulatory. No distress. Next appt: 6 months, patient aware.

## 2024-08-20 ENCOUNTER — HOSPITAL ENCOUNTER (OUTPATIENT)
Facility: HOSPITAL | Age: 59
Setting detail: OUTPATIENT SURGERY
Discharge: HOME | End: 2024-08-20
Attending: OBSTETRICS & GYNECOLOGY | Admitting: OBSTETRICS & GYNECOLOGY
Payer: MEDICAID

## 2024-08-20 ENCOUNTER — ANESTHESIA (OUTPATIENT)
Facility: HOSPITAL | Age: 59
End: 2024-08-20
Payer: MEDICAID

## 2024-08-20 VITALS
DIASTOLIC BLOOD PRESSURE: 51 MMHG | RESPIRATION RATE: 16 BRPM | HEART RATE: 75 BPM | SYSTOLIC BLOOD PRESSURE: 119 MMHG | TEMPERATURE: 98.2 F | BODY MASS INDEX: 22.18 KG/M2 | WEIGHT: 110 LBS | OXYGEN SATURATION: 98 % | HEIGHT: 59 IN

## 2024-08-20 PROCEDURE — 3700000000 HC ANESTHESIA ATTENDED CARE: Performed by: OBSTETRICS & GYNECOLOGY

## 2024-08-20 PROCEDURE — 2500000003 HC RX 250 WO HCPCS: Performed by: OBSTETRICS & GYNECOLOGY

## 2024-08-20 PROCEDURE — 2500000003 HC RX 250 WO HCPCS

## 2024-08-20 PROCEDURE — 88307 TISSUE EXAM BY PATHOLOGIST: CPT

## 2024-08-20 PROCEDURE — 6360000002 HC RX W HCPCS

## 2024-08-20 PROCEDURE — 6370000000 HC RX 637 (ALT 250 FOR IP)

## 2024-08-20 PROCEDURE — 6370000000 HC RX 637 (ALT 250 FOR IP): Performed by: OBSTETRICS & GYNECOLOGY

## 2024-08-20 PROCEDURE — 2709999900 HC NON-CHARGEABLE SUPPLY: Performed by: OBSTETRICS & GYNECOLOGY

## 2024-08-20 PROCEDURE — 7100000011 HC PHASE II RECOVERY - ADDTL 15 MIN: Performed by: OBSTETRICS & GYNECOLOGY

## 2024-08-20 PROCEDURE — 3700000001 HC ADD 15 MINUTES (ANESTHESIA): Performed by: OBSTETRICS & GYNECOLOGY

## 2024-08-20 PROCEDURE — 3600000013 HC SURGERY LEVEL 3 ADDTL 15MIN: Performed by: OBSTETRICS & GYNECOLOGY

## 2024-08-20 PROCEDURE — 7100000000 HC PACU RECOVERY - FIRST 15 MIN: Performed by: OBSTETRICS & GYNECOLOGY

## 2024-08-20 PROCEDURE — 3600000003 HC SURGERY LEVEL 3 BASE: Performed by: OBSTETRICS & GYNECOLOGY

## 2024-08-20 PROCEDURE — 7100000010 HC PHASE II RECOVERY - FIRST 15 MIN: Performed by: OBSTETRICS & GYNECOLOGY

## 2024-08-20 PROCEDURE — 2580000003 HC RX 258: Performed by: ANESTHESIOLOGY

## 2024-08-20 RX ORDER — FENTANYL CITRATE 50 UG/ML
INJECTION, SOLUTION INTRAMUSCULAR; INTRAVENOUS PRN
Status: DISCONTINUED | OUTPATIENT
Start: 2024-08-20 | End: 2024-08-20 | Stop reason: SDUPTHER

## 2024-08-20 RX ORDER — MIDAZOLAM HYDROCHLORIDE 1 MG/ML
INJECTION INTRAMUSCULAR; INTRAVENOUS PRN
Status: DISCONTINUED | OUTPATIENT
Start: 2024-08-20 | End: 2024-08-20 | Stop reason: SDUPTHER

## 2024-08-20 RX ORDER — SODIUM CHLORIDE 9 MG/ML
INJECTION, SOLUTION INTRAVENOUS PRN
Status: DISCONTINUED | OUTPATIENT
Start: 2024-08-20 | End: 2024-08-20 | Stop reason: HOSPADM

## 2024-08-20 RX ORDER — SODIUM CHLORIDE 0.9 % (FLUSH) 0.9 %
5-40 SYRINGE (ML) INJECTION PRN
Status: DISCONTINUED | OUTPATIENT
Start: 2024-08-20 | End: 2024-08-20 | Stop reason: HOSPADM

## 2024-08-20 RX ORDER — IODINE SOLUTION STRONG 5% (LUGOL'S) 5 %
SOLUTION ORAL PRN
Status: DISCONTINUED | OUTPATIENT
Start: 2024-08-20 | End: 2024-08-20 | Stop reason: ALTCHOICE

## 2024-08-20 RX ORDER — SODIUM CHLORIDE 0.9 % (FLUSH) 0.9 %
5-40 SYRINGE (ML) INJECTION EVERY 12 HOURS SCHEDULED
Status: DISCONTINUED | OUTPATIENT
Start: 2024-08-20 | End: 2024-08-20 | Stop reason: HOSPADM

## 2024-08-20 RX ORDER — FENTANYL CITRATE 50 UG/ML
25 INJECTION, SOLUTION INTRAMUSCULAR; INTRAVENOUS EVERY 5 MIN PRN
Status: DISCONTINUED | OUTPATIENT
Start: 2024-08-20 | End: 2024-08-20 | Stop reason: HOSPADM

## 2024-08-20 RX ORDER — LIDOCAINE HYDROCHLORIDE 20 MG/ML
INJECTION, SOLUTION EPIDURAL; INFILTRATION; INTRACAUDAL; PERINEURAL PRN
Status: DISCONTINUED | OUTPATIENT
Start: 2024-08-20 | End: 2024-08-20 | Stop reason: SDUPTHER

## 2024-08-20 RX ORDER — MIDAZOLAM HYDROCHLORIDE 5 MG/5ML
2 INJECTION, SOLUTION INTRAMUSCULAR; INTRAVENOUS
Status: DISCONTINUED | OUTPATIENT
Start: 2024-08-20 | End: 2024-08-20 | Stop reason: HOSPADM

## 2024-08-20 RX ORDER — SODIUM CHLORIDE, SODIUM LACTATE, POTASSIUM CHLORIDE, CALCIUM CHLORIDE 600; 310; 30; 20 MG/100ML; MG/100ML; MG/100ML; MG/100ML
INJECTION, SOLUTION INTRAVENOUS CONTINUOUS
Status: DISCONTINUED | OUTPATIENT
Start: 2024-08-20 | End: 2024-08-20 | Stop reason: HOSPADM

## 2024-08-20 RX ORDER — KETOROLAC TROMETHAMINE 30 MG/ML
INJECTION, SOLUTION INTRAMUSCULAR; INTRAVENOUS PRN
Status: DISCONTINUED | OUTPATIENT
Start: 2024-08-20 | End: 2024-08-20 | Stop reason: SDUPTHER

## 2024-08-20 RX ORDER — NALOXONE HYDROCHLORIDE 0.4 MG/ML
INJECTION, SOLUTION INTRAMUSCULAR; INTRAVENOUS; SUBCUTANEOUS PRN
Status: DISCONTINUED | OUTPATIENT
Start: 2024-08-20 | End: 2024-08-20 | Stop reason: HOSPADM

## 2024-08-20 RX ORDER — PROPOFOL 10 MG/ML
INJECTION, EMULSION INTRAVENOUS CONTINUOUS PRN
Status: DISCONTINUED | OUTPATIENT
Start: 2024-08-20 | End: 2024-08-20 | Stop reason: SDUPTHER

## 2024-08-20 RX ORDER — ONDANSETRON 2 MG/ML
4 INJECTION INTRAMUSCULAR; INTRAVENOUS
Status: DISCONTINUED | OUTPATIENT
Start: 2024-08-20 | End: 2024-08-20 | Stop reason: HOSPADM

## 2024-08-20 RX ORDER — LIDOCAINE HYDROCHLORIDE 10 MG/ML
1 INJECTION, SOLUTION EPIDURAL; INFILTRATION; INTRACAUDAL; PERINEURAL
Status: DISCONTINUED | OUTPATIENT
Start: 2024-08-20 | End: 2024-08-20 | Stop reason: HOSPADM

## 2024-08-20 RX ORDER — LIDOCAINE HYDROCHLORIDE AND EPINEPHRINE 10; 10 MG/ML; UG/ML
INJECTION, SOLUTION INFILTRATION; PERINEURAL PRN
Status: DISCONTINUED | OUTPATIENT
Start: 2024-08-20 | End: 2024-08-20 | Stop reason: ALTCHOICE

## 2024-08-20 RX ORDER — ACETAMINOPHEN 500 MG
TABLET ORAL
Status: COMPLETED
Start: 2024-08-20 | End: 2024-08-20

## 2024-08-20 RX ORDER — MEPERIDINE HYDROCHLORIDE 25 MG/ML
12.5 INJECTION INTRAMUSCULAR; INTRAVENOUS; SUBCUTANEOUS EVERY 5 MIN PRN
Status: DISCONTINUED | OUTPATIENT
Start: 2024-08-20 | End: 2024-08-20 | Stop reason: HOSPADM

## 2024-08-20 RX ORDER — FERRIC SUBSULFATE 0.21 G/G
LIQUID TOPICAL PRN
Status: DISCONTINUED | OUTPATIENT
Start: 2024-08-20 | End: 2024-08-20 | Stop reason: ALTCHOICE

## 2024-08-20 RX ORDER — ONDANSETRON 2 MG/ML
INJECTION INTRAMUSCULAR; INTRAVENOUS PRN
Status: DISCONTINUED | OUTPATIENT
Start: 2024-08-20 | End: 2024-08-20 | Stop reason: SDUPTHER

## 2024-08-20 RX ORDER — DROPERIDOL 2.5 MG/ML
0.62 INJECTION, SOLUTION INTRAMUSCULAR; INTRAVENOUS
Status: DISCONTINUED | OUTPATIENT
Start: 2024-08-20 | End: 2024-08-20 | Stop reason: HOSPADM

## 2024-08-20 RX ORDER — ACETAMINOPHEN 500 MG
1000 TABLET ORAL ONCE
Status: COMPLETED | OUTPATIENT
Start: 2024-08-20 | End: 2024-08-20

## 2024-08-20 RX ORDER — KETOROLAC TROMETHAMINE 30 MG/ML
30 INJECTION, SOLUTION INTRAMUSCULAR; INTRAVENOUS
Status: DISCONTINUED | OUTPATIENT
Start: 2024-08-20 | End: 2024-08-20 | Stop reason: HOSPADM

## 2024-08-20 RX ADMIN — SODIUM CHLORIDE, POTASSIUM CHLORIDE, SODIUM LACTATE AND CALCIUM CHLORIDE: 600; 310; 30; 20 INJECTION, SOLUTION INTRAVENOUS at 07:42

## 2024-08-20 RX ADMIN — Medication 1000 MG: at 07:47

## 2024-08-20 RX ADMIN — LIDOCAINE HYDROCHLORIDE 40 MG: 20 INJECTION, SOLUTION EPIDURAL; INFILTRATION; INTRACAUDAL; PERINEURAL at 08:31

## 2024-08-20 RX ADMIN — FENTANYL CITRATE 25 MCG: 50 INJECTION, SOLUTION INTRAMUSCULAR; INTRAVENOUS at 08:41

## 2024-08-20 RX ADMIN — PROPOFOL 30 MG: 10 INJECTION, EMULSION INTRAVENOUS at 08:33

## 2024-08-20 RX ADMIN — KETOROLAC TROMETHAMINE 30 MG: 30 INJECTION, SOLUTION INTRAMUSCULAR at 08:53

## 2024-08-20 RX ADMIN — PROPOFOL 150 MCG/KG/MIN: 10 INJECTION, EMULSION INTRAVENOUS at 08:31

## 2024-08-20 RX ADMIN — ACETAMINOPHEN 1000 MG: 500 TABLET ORAL at 07:47

## 2024-08-20 RX ADMIN — ONDANSETRON 4 MG: 2 INJECTION, SOLUTION INTRAMUSCULAR; INTRAVENOUS at 08:34

## 2024-08-20 RX ADMIN — MIDAZOLAM HYDROCHLORIDE 2 MG: 1 INJECTION, SOLUTION INTRAMUSCULAR; INTRAVENOUS at 08:25

## 2024-08-20 ASSESSMENT — PAIN - FUNCTIONAL ASSESSMENT: PAIN_FUNCTIONAL_ASSESSMENT: NONE - DENIES PAIN

## 2024-08-20 NOTE — H&P
Gynecology History and Physical    Name: Dora Copeland MRN: [id number] SSN: [id number]    YOB: 1965  Age: 58 y.o.  Sex: female       Subjective:      Chief complaint:  Cervical dysplasia    Dora is a 58 y.o.  female with a history of abnormal pap. Colposcopy done in the office with cervical biopsies notable for CIN2.   She is admitted for Procedure(s) (LRB):  LOOP ELECTROSURGICAL EXCISION PROCEDURE (N/A).    OB History          2    Para   2    Term   2            AB        Living             SAB        IAB        Ectopic        Molar        Multiple        Live Births   2              Past Medical History:   Diagnosis Date    Aortic valvar stenosis 2015    CAD (coronary artery disease)     Carotid stenosis, bilateral     Essential hypertension, benign 12/10/2010    High risk for fracture due to osteoporosis by DEXA scan 2015    Hypercholesteremia 3/15/2010    Murmur     Myocardial infarction (HCC)     pt denies    Osteopenia     RA (rheumatoid arthritis) (HCC) 3/15/2010     Past Surgical History:   Procedure Laterality Date    COLONOSCOPY      ENDOSCOPY, COLON, DIAGNOSTIC      GYN      BTL    LAP,TUBAL CAUTERY      RENAL SCOPE,REMV FB/STONE       Social History     Occupational History    Not on file   Tobacco Use    Smoking status: Never    Smokeless tobacco: Never   Vaping Use    Vaping status: Never Used   Substance and Sexual Activity    Alcohol use: No    Drug use: No    Sexual activity: Yes     Family History   Problem Relation Age of Onset    Elevated Lipids Mother     Thyroid Disease Mother     Heart Disease Mother     Stroke Mother 50    Cancer Maternal Aunt         breast    Breast Cancer Maternal Aunt         over 50    Cancer Maternal Grandmother         throat/was snuff user    Heart Disease Daughter         Allergies   Allergen Reactions    Atorvastatin Other (See Comments) and Dizziness or Vertigo     Dizzy and nausea    Ezetimibe Other (See Comments) and

## 2024-08-20 NOTE — DISCHARGE INSTRUCTIONS
>>>You received Tylenol 1000mg prior to your surgery, you may take Tylenol (or pain medication containing Tylenol or Acetaminophen) in 6 hours at 1:50 pm.<<<    >>>You received Toradol during your surgery. You may not take any form of NSAIDS (non steroidal anti inflammatory drugs) such as Advil, Ibuprofen, Aleve, Motrin until 2:55 pm.<<<    After Care Instructions For Cervical Conization    You may resume your usual diet once the nausea resolves. Initially, you may try sips of warm fluids and a bland diet.     Avoid heavy lifting and straining.  Gradually increase your activity. First, try walking and doing light activity around the house.  Resume your normal habits if no significant discomfort or bleeding develops.  Most women can return to work within one to four days after this procedure.     You may take showers.  Avoid using a tub bath, swimming pool or hot tub until after your check-up.     It takes the cervix 5-6 weeks to heal.  During this time, there will be some vaginal bleeding or yellow vaginal discharge.  This may increase with activity.  Also, this procedure should not alter your normal menstrual cycle.     Do not put anything in your vagina as this may cause excessive bleeding or infection.  Do not douche, use tampons or have intercourse until after your check-up.    It is normal to feel some mild crampy pain in your lower abdomen.  This discomfort should be relieved with Tylenol or Motrin.     Contact the office if you have excessive bleeding (saturating a pad an hour for two hours or passing large clots), chills, or a temperature greater than 100.4.     You should be seen in the office following the procedure.  Your physician should have given you specific information regarding the appropriate time for this appointment.   Please contact the office for an appointment if this has not already been arranged.  Our office phone number is [phone].  If appropriate, the microscopic results from your

## 2024-08-20 NOTE — PERIOP NOTE
Dora GAMBINO Min  1965  [id number]    Situation:  Verbal report given from: Victor Manuel Dey and Justina DAI  Procedure: Procedure(s):  LOOP ELECTROSURGICAL EXCISION PROCEDURE    Background:    Preoperative diagnosis: VIJAY II (cervical intraepithelial neoplasia II) [N87.1]    Postoperative diagnosis: * No post-op diagnosis entered *    :  Dr. Jed Kim    Assistant(s): Circulator: William Andrews RN  Scrub Person First: Daya Brady RN    Specimens:   ID Type Source Tests Collected by Time Destination   1 : LEEP Tissue Cervix SURGICAL PATHOLOGY Karyn Qureshi MD 8/20/2024 0848        Assessment:  Intra-procedure medications         Anesthesia gave intra-procedure sedation and medications, see anesthesia flow sheet     Intravenous fluids: LR@ KVO     Vital signs stable       Recommendation:    Permission to share finding with Roland

## 2024-08-20 NOTE — ANESTHESIA PRE PROCEDURE
results found for: \"COVID19\"        Anesthesia Evaluation  Patient summary reviewed and Nursing notes reviewed  Airway: Mallampati: II          Dental:    (+) partials  Comment: Upper front partial    Pulmonary:Negative Pulmonary ROS breath sounds clear to auscultation                             Cardiovascular:  Exercise tolerance: good (>4 METS)  (+) hypertension:, valvular problems/murmurs (mild): AS, CAD:, hyperlipidemia        Rhythm: regular  Rate: normal  Echocardiogram reviewed         Beta Blocker:  Not on Beta Blocker      ROS comment: Heart murmur    02/22 ECHO= EF 55-60%, mild AS     Neuro/Psych:   Negative Neuro/Psych ROS              GI/Hepatic/Renal: Neg GI/Hepatic/Renal ROS            Endo/Other:    (+) : arthritis: rheumatoid..                  ROS comment: VIJAY II    Charcot-Kamini-Tooth Abdominal:             Vascular:   + PVD, aortic or cerebral.       Other Findings:       Anesthesia Plan      MAC     ASA 2       Induction: intravenous.    MIPS: Postoperative opioids intended and Prophylactic antiemetics administered.  Anesthetic plan and risks discussed with patient.      Plan discussed with CRNA.                Payal Wiley MD   8/20/2024

## 2024-08-20 NOTE — PERIOP NOTE
Drowsy but arouses to answer questions. Shivering, mistral warming blanket applied.  0915 Declines anything to drink at this time. Denies nausea.  0923 Awake, talking, \"ready to go home\"  0930 D/C instructions reviewed with hugo Watkins  0939 Assisted with dressing, ambulated to bathroom. Voided adequate amount. Discharged to home via/wc,accompanied to car per RN. Skin warm and dry, awake and alert. Respirations even, unlabored. Pt and family members questions and concerns addressed prior to discharge. All belongings with pt.

## 2024-08-20 NOTE — OP NOTE
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 After undergoing adequate general endotracheal anesthesia, she was placed in the Jmimy stirrups in the dorsal lithotomy position. A sterile speculum was then placed in the vagina.  1% lidocaine with epinephrine was injected for paracervical block, 5cc each side at 4 and 8 oclock.    Lugol's solution was then applied to the cervix with the findings noted above.  A [id number] loop excisional electrode was used to take one pass at the cervix, ofrom 12 o'clock to 6 o'clock.   A rollerball was then used to burn all edges and acquire hemostasis.  Hemostasis was excellent. Moncels was then applied.  All instruments were then removed.     Karyn Qureshi MD

## 2024-08-20 NOTE — ANESTHESIA POSTPROCEDURE EVALUATION
Department of Anesthesiology  Postprocedure Note    Patient: Dora Copeland  MRN: [id number]  YOB: 1965  Date of evaluation: 8/20/2024    Procedure Summary       Date: 08/20/24 Room / Location: [facility] ASU  / [facility] AMBULATORY OR    Anesthesia Start: 0826 Anesthesia Stop: 0906    Procedure: LOOP ELECTROSURGICAL EXCISION PROCEDURE (Uterus) Diagnosis:       VIJAY II (cervical intraepithelial neoplasia II)      (VIJAY II (cervical intraepithelial neoplasia II) [N87.1])    Surgeons: Karyn Qureshi MD Responsible Provider: Payal Wiley MD    Anesthesia Type: MAC ASA Status: 2            Anesthesia Type: MAC    Isha Phase I: Isha Score: 9    Isha Phase II: Isha Score: 10    Anesthesia Post Evaluation    Patient location during evaluation: PACU  Patient participation: complete - patient participated  Level of consciousness: awake and alert  Pain score: 0  Airway patency: patent  Nausea & Vomiting: no vomiting and no nausea  Cardiovascular status: blood pressure returned to baseline and hemodynamically stable  Respiratory status: acceptable  Hydration status: stable  There was medical reason for not using a multimodal analgesia pain management approach.Pain management: satisfactory to patient    No notable events documented.

## 2024-08-20 NOTE — PROGRESS NOTES
METS >4  
Permission received to review discharge instructions and discuss private health information with Roland.    Patient states family/friend will be with them for 24 hours following procedure.     
Communicable/Infectious
Authored by Resident/PA/NP

## 2024-09-19 ENCOUNTER — CLINICAL DOCUMENTATION (OUTPATIENT)
Age: 59
End: 2024-09-19

## 2024-09-25 ENCOUNTER — OFFICE VISIT (OUTPATIENT)
Age: 59
End: 2024-09-25
Payer: MEDICAID

## 2024-09-25 VITALS
DIASTOLIC BLOOD PRESSURE: 58 MMHG | HEIGHT: 59 IN | WEIGHT: 110 LBS | TEMPERATURE: 97 F | SYSTOLIC BLOOD PRESSURE: 126 MMHG | OXYGEN SATURATION: 98 % | RESPIRATION RATE: 18 BRPM | HEART RATE: 78 BPM | BODY MASS INDEX: 22.18 KG/M2

## 2024-09-25 DIAGNOSIS — E78.2 MIXED HYPERLIPIDEMIA: ICD-10-CM

## 2024-09-25 DIAGNOSIS — G60.0 CMT (CHARCOT-MARIE-TOOTH DISEASE): ICD-10-CM

## 2024-09-25 DIAGNOSIS — M81.0 AGE-RELATED OSTEOPOROSIS WITHOUT CURRENT PATHOLOGICAL FRACTURE: ICD-10-CM

## 2024-09-25 DIAGNOSIS — M05.9 RHEUMATOID ARTHRITIS WITH POSITIVE RHEUMATOID FACTOR, INVOLVING UNSPECIFIED SITE (HCC): ICD-10-CM

## 2024-09-25 DIAGNOSIS — E55.9 VITAMIN D DEFICIENCY, UNSPECIFIED: ICD-10-CM

## 2024-09-25 DIAGNOSIS — I10 ESSENTIAL (PRIMARY) HYPERTENSION: ICD-10-CM

## 2024-09-25 DIAGNOSIS — Z00.00 ROUTINE GENERAL MEDICAL EXAMINATION AT A HEALTH CARE FACILITY: Primary | ICD-10-CM

## 2024-09-25 DIAGNOSIS — I35.0 AORTIC VALVE STENOSIS, ETIOLOGY OF CARDIAC VALVE DISEASE UNSPECIFIED: ICD-10-CM

## 2024-09-25 PROBLEM — N87.1 CERVICAL INTRAEPITHELIAL NEOPLASIA GRADE 2: Status: ACTIVE | Noted: 2024-06-19

## 2024-09-25 PROBLEM — B97.7 HUMAN PAPILLOMA VIRUS INFECTION: Status: ACTIVE | Noted: 2024-05-28

## 2024-09-25 LAB
25(OH)D3 SERPL-MCNC: 47.7 NG/ML (ref 30–100)
ALBUMIN SERPL-MCNC: 3.9 G/DL (ref 3.5–5)
ALBUMIN/GLOB SERPL: 1.3 (ref 1.1–2.2)
ALP SERPL-CCNC: 77 U/L (ref 45–117)
ALT SERPL-CCNC: 14 U/L (ref 12–78)
ANION GAP SERPL CALC-SCNC: 4 MMOL/L (ref 2–12)
APPEARANCE UR: CLEAR
AST SERPL-CCNC: 12 U/L (ref 15–37)
BILIRUB SERPL-MCNC: 0.5 MG/DL (ref 0.2–1)
BILIRUB UR QL: NEGATIVE
BUN SERPL-MCNC: 8 MG/DL (ref 6–20)
BUN/CREAT SERPL: 14 (ref 12–20)
CALCIUM SERPL-MCNC: 8.6 MG/DL (ref 8.5–10.1)
CHLORIDE SERPL-SCNC: 111 MMOL/L (ref 97–108)
CHOLEST SERPL-MCNC: 125 MG/DL
CO2 SERPL-SCNC: 29 MMOL/L (ref 21–32)
COLOR UR: NORMAL
CREAT SERPL-MCNC: 0.56 MG/DL (ref 0.55–1.02)
ERYTHROCYTE [DISTWIDTH] IN BLOOD BY AUTOMATED COUNT: 13.1 % (ref 11.5–14.5)
GLOBULIN SER CALC-MCNC: 2.9 G/DL (ref 2–4)
GLUCOSE SERPL-MCNC: 88 MG/DL (ref 65–100)
GLUCOSE UR STRIP.AUTO-MCNC: NEGATIVE MG/DL
HCT VFR BLD AUTO: 42.3 % (ref 35–47)
HDLC SERPL-MCNC: 61 MG/DL
HDLC SERPL: 2 (ref 0–5)
HGB BLD-MCNC: 13.8 G/DL (ref 11.5–16)
HGB UR QL STRIP: NEGATIVE
KETONES UR QL STRIP.AUTO: NEGATIVE MG/DL
LDLC SERPL CALC-MCNC: 46.8 MG/DL (ref 0–100)
LEUKOCYTE ESTERASE UR QL STRIP.AUTO: NEGATIVE
MCH RBC QN AUTO: 29.5 PG (ref 26–34)
MCHC RBC AUTO-ENTMCNC: 32.6 G/DL (ref 30–36.5)
MCV RBC AUTO: 90.4 FL (ref 80–99)
NITRITE UR QL STRIP.AUTO: NEGATIVE
NRBC # BLD: 0 K/UL (ref 0–0.01)
NRBC BLD-RTO: 0 PER 100 WBC
PH UR STRIP: 7 (ref 5–8)
PLATELET # BLD AUTO: 248 K/UL (ref 150–400)
PMV BLD AUTO: 10.9 FL (ref 8.9–12.9)
POTASSIUM SERPL-SCNC: 3.8 MMOL/L (ref 3.5–5.1)
PROT SERPL-MCNC: 6.8 G/DL (ref 6.4–8.2)
PROT UR STRIP-MCNC: NEGATIVE MG/DL
RBC # BLD AUTO: 4.68 M/UL (ref 3.8–5.2)
SODIUM SERPL-SCNC: 144 MMOL/L (ref 136–145)
SP GR UR REFRACTOMETRY: 1.02 (ref 1–1.03)
TRIGL SERPL-MCNC: 86 MG/DL
TSH SERPL DL<=0.05 MIU/L-ACNC: 1.23 UIU/ML (ref 0.36–3.74)
UROBILINOGEN UR QL STRIP.AUTO: 1 EU/DL (ref 0.2–1)
VLDLC SERPL CALC-MCNC: 17.2 MG/DL
WBC # BLD AUTO: 6.8 K/UL (ref 3.6–11)

## 2024-09-25 PROCEDURE — 3078F DIAST BP <80 MM HG: CPT | Performed by: NURSE PRACTITIONER

## 2024-09-25 PROCEDURE — 99396 PREV VISIT EST AGE 40-64: CPT | Performed by: NURSE PRACTITIONER

## 2024-09-25 PROCEDURE — 3074F SYST BP LT 130 MM HG: CPT | Performed by: NURSE PRACTITIONER

## 2024-09-25 SDOH — ECONOMIC STABILITY: INCOME INSECURITY: HOW HARD IS IT FOR YOU TO PAY FOR THE VERY BASICS LIKE FOOD, HOUSING, MEDICAL CARE, AND HEATING?: SOMEWHAT HARD

## 2024-09-25 SDOH — ECONOMIC STABILITY: FOOD INSECURITY: WITHIN THE PAST 12 MONTHS, YOU WORRIED THAT YOUR FOOD WOULD RUN OUT BEFORE YOU GOT MONEY TO BUY MORE.: NEVER TRUE

## 2024-09-25 SDOH — ECONOMIC STABILITY: FOOD INSECURITY: WITHIN THE PAST 12 MONTHS, THE FOOD YOU BOUGHT JUST DIDN'T LAST AND YOU DIDN'T HAVE MONEY TO GET MORE.: NEVER TRUE

## 2024-09-25 ASSESSMENT — PATIENT HEALTH QUESTIONNAIRE - PHQ9
1. LITTLE INTEREST OR PLEASURE IN DOING THINGS: NOT AT ALL
SUM OF ALL RESPONSES TO PHQ QUESTIONS 1-9: 0
2. FEELING DOWN, DEPRESSED OR HOPELESS: NOT AT ALL
SUM OF ALL RESPONSES TO PHQ QUESTIONS 1-9: 0
SUM OF ALL RESPONSES TO PHQ QUESTIONS 1-9: 0
SUM OF ALL RESPONSES TO PHQ9 QUESTIONS 1 & 2: 0
SUM OF ALL RESPONSES TO PHQ QUESTIONS 1-9: 0

## 2024-09-25 ASSESSMENT — ENCOUNTER SYMPTOMS
BLOOD IN STOOL: 0
EYE PAIN: 0
DIARRHEA: 0
EYE REDNESS: 0
ABDOMINAL PAIN: 0
COUGH: 0
SHORTNESS OF BREATH: 0
VOMITING: 0
NAUSEA: 0
CONSTIPATION: 0
EYE ITCHING: 1
EYE DISCHARGE: 0

## 2024-10-21 RX ORDER — OMEPRAZOLE 40 MG/1
40 CAPSULE, DELAYED RELEASE ORAL EVERY MORNING
Qty: 90 CAPSULE | Refills: 1 | Status: SHIPPED | OUTPATIENT
Start: 2024-10-21

## 2024-10-21 NOTE — TELEPHONE ENCOUNTER
I show this was d/c on 8/5/24. Please sign if appropriate.    Last appointment: 9/25/24  Next appointment: 2/25/25  Previous refill encounter(s): 5/3/23    Requested Prescriptions     Pending Prescriptions Disp Refills    omeprazole (PRILOSEC) 40 MG delayed release capsule 90 capsule 1     Sig: Take 1 capsule by mouth every morning         For Pharmacy Admin Tracking Only    Program: Medication Refill  CPA in place:    Recommendation Provided To:   Intervention Detail: New Rx: 1, reason: Patient Preference  Intervention Accepted By:   Gap Closed?:    Time Spent (min): 5

## 2025-01-18 DIAGNOSIS — E78.2 MIXED HYPERLIPIDEMIA: ICD-10-CM

## 2025-01-20 RX ORDER — EVOLOCUMAB 140 MG/ML
INJECTION, SOLUTION SUBCUTANEOUS
Qty: 6 ADJUSTABLE DOSE PRE-FILLED PEN SYRINGE | Refills: 1 | Status: SHIPPED | OUTPATIENT
Start: 2025-01-20

## 2025-01-22 RX ORDER — ROSUVASTATIN CALCIUM 40 MG/1
40 TABLET, COATED ORAL
Qty: 90 TABLET | Refills: 0 | Status: SHIPPED | OUTPATIENT
Start: 2025-01-22

## 2025-01-22 NOTE — TELEPHONE ENCOUNTER
Last appointment: 9/25/24  Next appointment: 2/25/25  Previous refill encounter(s): 8/19/24 #90 with 1 refill    Requested Prescriptions     Pending Prescriptions Disp Refills    rosuvastatin (CRESTOR) 40 MG tablet 90 tablet 0     Sig: Take 1 tablet by mouth nightly         For Pharmacy Admin Tracking Only    Program: Medication Refill  CPA in place:    Recommendation Provided To:   Intervention Detail: New Rx: 1, reason: Patient Preference  Intervention Accepted By:   Gap Closed?:    Time Spent (min): 5

## 2025-02-11 DIAGNOSIS — M81.0 OSTEOPOROSIS, UNSPECIFIED OSTEOPOROSIS TYPE, UNSPECIFIED PATHOLOGICAL FRACTURE PRESENCE: Primary | ICD-10-CM

## 2025-02-17 ENCOUNTER — HOSPITAL ENCOUNTER (OUTPATIENT)
Facility: HOSPITAL | Age: 60
Setting detail: INFUSION SERIES
Discharge: HOME | End: 2025-02-17
Payer: MEDICAID

## 2025-02-17 VITALS
DIASTOLIC BLOOD PRESSURE: 63 MMHG | RESPIRATION RATE: 16 BRPM | SYSTOLIC BLOOD PRESSURE: 112 MMHG | TEMPERATURE: 99 F | BODY MASS INDEX: 22.56 KG/M2 | HEIGHT: 59 IN | HEART RATE: 88 BPM | OXYGEN SATURATION: 97 % | WEIGHT: 111.9 LBS

## 2025-02-17 DIAGNOSIS — M81.0 OSTEOPOROSIS, UNSPECIFIED OSTEOPOROSIS TYPE, UNSPECIFIED PATHOLOGICAL FRACTURE PRESENCE: Primary | ICD-10-CM

## 2025-02-17 LAB
ANION GAP BLD CALC-SCNC: 11.7 MMOL/L (ref 10–20)
CA-I BLD-MCNC: 1.14 MMOL/L (ref 1.15–1.33)
CHLORIDE BLD-SCNC: 109 MMOL/L (ref 98–107)
CO2 BLD-SCNC: 22.3 MMOL/L (ref 21–32)
CREAT BLD-MCNC: 0.48 MG/DL (ref 0.6–1.3)
GLUCOSE BLD-MCNC: 93 MG/DL (ref 74–99)
PHOSPHATE SERPL-MCNC: 2.9 MG/DL (ref 2.6–4.7)
POTASSIUM BLD-SCNC: 3.6 MMOL/L (ref 3.5–5.1)
SERVICE CMNT-IMP: ABNORMAL
SODIUM BLD-SCNC: 143 MMOL/L (ref 136–145)

## 2025-02-17 PROCEDURE — 6360000002 HC RX W HCPCS: Performed by: STUDENT IN AN ORGANIZED HEALTH CARE EDUCATION/TRAINING PROGRAM

## 2025-02-17 PROCEDURE — 36415 COLL VENOUS BLD VENIPUNCTURE: CPT

## 2025-02-17 PROCEDURE — 80047 BASIC METABLC PNL IONIZED CA: CPT

## 2025-02-17 PROCEDURE — 96372 THER/PROPH/DIAG INJ SC/IM: CPT

## 2025-02-17 PROCEDURE — 84100 ASSAY OF PHOSPHORUS: CPT

## 2025-02-17 RX ADMIN — DENOSUMAB 60 MG: 60 INJECTION SUBCUTANEOUS at 10:08

## 2025-02-17 NOTE — PROGRESS NOTES
[facility] Visit Note:  Arrived - 0945    /63   Pulse 88   Temp 99 °F (37.2 °C) (Temporal)   Resp 16   Ht 1.499 m (4' 11\")   Wt 50.8 kg (111 lb 14.4 oz)   SpO2 97%   BMI 22.60 kg/m²      Assessment - unchanged. Reviewed Prolia info. Pt denies any recent or upcoming dental work.Labs drawn peripherally from L arm-istat chem8 and phos.  Prolia 60mg SQ slowly in left arm.    Recent Results (from the past 12 hour(s))   POC CHEM 8    Collection Time: 02/17/25  9:55 AM   Result Value Ref Range    POC Ionized Calcium 1.14 (L) 1.15 - 1.33 mmol/L    POC Sodium 143 136 - 145 mmol/L    POC Potassium 3.6 3.5 - 5.1 mmol/L    POC Chloride 109 (H) 98 - 107 mmol/L    POC TCO2 22.3 21 - 32 mmol/L    Anion Gap, POC 11.7 10 - 20 mmol/L    POC Glucose 93 74 - 99 mg/dL    POC Creatinine 0.48 (L) 0.6 - 1.3 mg/dL    eGFR, POC >90 >60 ml/min/1.73m2    UA Comment Comment Not Indicated.        1010 - Tolerated well. No reaction noted. Reviewed Prolia D/C instructions. Verbalized understanding. Pt denies any acute problems/changes. Discharged from [facility] ambulatory. No distress. Next appt: 8/21/25

## 2025-02-25 ENCOUNTER — OFFICE VISIT (OUTPATIENT)
Age: 60
End: 2025-02-25
Payer: MEDICAID

## 2025-02-25 VITALS
HEIGHT: 60 IN | DIASTOLIC BLOOD PRESSURE: 60 MMHG | HEART RATE: 95 BPM | SYSTOLIC BLOOD PRESSURE: 128 MMHG | OXYGEN SATURATION: 98 % | TEMPERATURE: 98.7 F | RESPIRATION RATE: 20 BRPM | WEIGHT: 111.4 LBS | BODY MASS INDEX: 21.87 KG/M2

## 2025-02-25 DIAGNOSIS — Z76.89 ENCOUNTER TO ESTABLISH CARE: Primary | ICD-10-CM

## 2025-02-25 DIAGNOSIS — L85.3 DRY SKIN: ICD-10-CM

## 2025-02-25 DIAGNOSIS — I35.0 AORTIC VALVE STENOSIS, ETIOLOGY OF CARDIAC VALVE DISEASE UNSPECIFIED: ICD-10-CM

## 2025-02-25 DIAGNOSIS — G60.0 CMT (CHARCOT-MARIE-TOOTH DISEASE): ICD-10-CM

## 2025-02-25 DIAGNOSIS — Z23 NEED FOR VACCINATION: ICD-10-CM

## 2025-02-25 DIAGNOSIS — I10 ESSENTIAL HYPERTENSION, BENIGN: ICD-10-CM

## 2025-02-25 DIAGNOSIS — M81.6 LOCALIZED OSTEOPOROSIS WITHOUT CURRENT PATHOLOGICAL FRACTURE: ICD-10-CM

## 2025-02-25 DIAGNOSIS — E78.5 DYSLIPIDEMIA, GOAL LDL BELOW 100: ICD-10-CM

## 2025-02-25 PROCEDURE — 90677 PCV20 VACCINE IM: CPT | Performed by: STUDENT IN AN ORGANIZED HEALTH CARE EDUCATION/TRAINING PROGRAM

## 2025-02-25 PROCEDURE — 99214 OFFICE O/P EST MOD 30 MIN: CPT | Performed by: STUDENT IN AN ORGANIZED HEALTH CARE EDUCATION/TRAINING PROGRAM

## 2025-02-25 SDOH — ECONOMIC STABILITY: FOOD INSECURITY: WITHIN THE PAST 12 MONTHS, YOU WORRIED THAT YOUR FOOD WOULD RUN OUT BEFORE YOU GOT MONEY TO BUY MORE.: NEVER TRUE

## 2025-02-25 SDOH — ECONOMIC STABILITY: FOOD INSECURITY: WITHIN THE PAST 12 MONTHS, THE FOOD YOU BOUGHT JUST DIDN'T LAST AND YOU DIDN'T HAVE MONEY TO GET MORE.: NEVER TRUE

## 2025-02-25 ASSESSMENT — PATIENT HEALTH QUESTIONNAIRE - PHQ9
SUM OF ALL RESPONSES TO PHQ QUESTIONS 1-9: 0
2. FEELING DOWN, DEPRESSED OR HOPELESS: NOT AT ALL
SUM OF ALL RESPONSES TO PHQ QUESTIONS 1-9: 0
SUM OF ALL RESPONSES TO PHQ9 QUESTIONS 1 & 2: 0
SUM OF ALL RESPONSES TO PHQ QUESTIONS 1-9: 0
1. LITTLE INTEREST OR PLEASURE IN DOING THINGS: NOT AT ALL
SUM OF ALL RESPONSES TO PHQ QUESTIONS 1-9: 0

## 2025-02-25 NOTE — PROGRESS NOTES
Chief Complaint   Patient presents with    Establish Care       \"Have you been to the ER, urgent care clinic since your last visit?  Hospitalized since your last visit?\"    NO    “Have you seen or consulted any other health care providers outside of [facility] since your last visit?”    NO    Have you had a mammogram?”   NO Scheduled    Date of last Mammogram: 2024         “Have you had a colorectal cancer screening such as a colonoscopy/FIT/Cologuard?    NO    Date of last Colonoscopy: 2016  No cologuard on file  No FIT/FOBT on file   No flexible sigmoidoscopy on file         Click Here for Release of Records Request     No results found for this visit on 25.   Vitals:    25 0806   BP: 128/60   Site: Right Upper Arm   Position: Sitting   Cuff Size: Large Adult   Pulse: 95   Resp: 20   Temp: 98.7 °F (37.1 °C)   TempSrc: Temporal   SpO2: 98%   Weight: 50.5 kg (111 lb 6.4 oz)   Height: 1.524 m (5')      Health Maintenance Due   Topic Date Due    Shingles vaccine (1 of 2) Never done    Pneumococcal 50+ years Vaccine (1 of 1 - PCV) Never done    Colorectal Cancer Screen  2021    Breast cancer screen  2025        The patient, Dora Copeland, identity was verified by name and .     Verbal Order with Readback given by Dr. George for [id number]. Given in Right Deltoid without difficulty.  Observed for 20 minutes with no reaction.  Injection tolerated well   
Prednisone Nausea And Vomiting       Past Medical History:   Diagnosis Date    Aortic valvar stenosis 2/23/2015    CAD (coronary artery disease)     Carotid stenosis, bilateral     Essential hypertension, benign 12/10/2010    High risk for fracture due to osteoporosis by DEXA scan 2/23/2015    Hypercholesteremia 3/15/2010    Murmur     Myocardial infarction (HCC)     pt denies    Osteopenia     RA (rheumatoid arthritis) (HCC) 3/15/2010       Past Surgical History:   Procedure Laterality Date    CERVIX SURGERY N/A 8/20/2024    LOOP ELECTROSURGICAL EXCISION PROCEDURE performed by Karyn Qureshi MD at [facility] AMBULATORY OR    COLONOSCOPY      ENDOSCOPY, COLON, DIAGNOSTIC      GYN      BTL    LAP,TUBAL CAUTERY      RENAL SCOPE,REMV FB/STONE          Family History   Problem Relation Age of Onset    Elevated Lipids Mother     Thyroid Disease Mother     Heart Disease Mother     Stroke Mother 50    Cancer Maternal Aunt         breast    Breast Cancer Maternal Aunt         over 50    Cancer Maternal Grandmother         throat/was snuff user    Heart Disease Daughter         Social History     Socioeconomic History    Marital status: Legally      Spouse name: Not on file    Number of children: Not on file    Years of education: Not on file    Highest education level: Not on file   Occupational History    Not on file   Tobacco Use    Smoking status: Never    Smokeless tobacco: Never   Vaping Use    Vaping status: Never Used   Substance and Sexual Activity    Alcohol use: No    Drug use: No    Sexual activity: Yes   Other Topics Concern    Not on file   Social History Narrative    Not on file     Social Determinants of Health     Financial Resource Strain: Medium Risk (9/25/2024)    Overall Financial Resource Strain (CARDIA)     Difficulty of Paying Living Expenses: Somewhat hard   Food Insecurity: No Food Insecurity (2/25/2025)    Hunger Vital Sign     Worried About Running Out of Food in the Last Year: Never

## 2025-04-21 RX ORDER — AMLODIPINE BESYLATE 10 MG/1
10 TABLET ORAL DAILY
Qty: 90 TABLET | Refills: 1 | Status: SHIPPED | OUTPATIENT
Start: 2025-04-21

## 2025-04-21 NOTE — TELEPHONE ENCOUNTER
Last appointment: 2/25/25  Next appointment: Advised to follow-up 8/25/25  Previous refill encounter(s): 8/19/24 #90 with 1 refill    Requested Prescriptions     Pending Prescriptions Disp Refills    amLODIPine (NORVASC) 10 MG tablet 90 tablet 1     Sig: Take 1 tablet by mouth daily         For Pharmacy Admin Tracking Only    Program: Medication Refill  CPA in place:    Recommendation Provided To:   Intervention Detail: New Rx: 1, reason: Patient Preference  Intervention Accepted By:   Gap Closed?:    Time Spent (min): 5

## 2025-05-28 RX ORDER — ROSUVASTATIN CALCIUM 40 MG/1
40 TABLET, COATED ORAL NIGHTLY
Qty: 90 TABLET | Refills: 1 | Status: SHIPPED | OUTPATIENT
Start: 2025-05-28

## 2025-05-28 NOTE — TELEPHONE ENCOUNTER
Last appointment: 2/25/25  Next appointment: Advised to follow-up 8/25/25  Previous refill encounter(s): 1/22/25 #90    Requested Prescriptions     Pending Prescriptions Disp Refills    rosuvastatin (CRESTOR) 40 MG tablet [Pharmacy Med Name: ROSUVASTATIN 40 MG TAB[*]] 90 tablet 1     Sig: TAKE ONE TABLET BY MOUTH AT BEDTIME         For Pharmacy Admin Tracking Only    Program: Medication Refill  CPA in place:    Recommendation Provided To:   Intervention Detail: New Rx: 1, reason: Patient Preference  Intervention Accepted By:   Gap Closed?:    Time Spent (min): 5

## 2025-06-30 DIAGNOSIS — E78.2 MIXED HYPERLIPIDEMIA: ICD-10-CM

## 2025-07-02 RX ORDER — EVOLOCUMAB 140 MG/ML
INJECTION, SOLUTION SUBCUTANEOUS
Qty: 6 ML | Refills: 1 | Status: ACTIVE | OUTPATIENT
Start: 2025-07-02

## 2025-07-02 NOTE — TELEPHONE ENCOUNTER
Last appointment: 2/25/25  Next appointment: Advised to follow-up 8/25/25  Previous refill encounter(s): 1/20/25 #6 with 1 refill    Requested Prescriptions     Pending Prescriptions Disp Refills    Evolocumab (REPATHA SURECLICK) SOAJ pen [Pharmacy Med Name: REPATHA 140 MG/ML SURECLICK[**^R]] 6 mL 1     Sig: INJECT 140 MG UNDER THE SKIN EVERY TWO WEEKS INTO THE THIGH, STOMACH, OR UPPER ARM         For Pharmacy Admin Tracking Only    Program: Medication Refill  CPA in place:    Recommendation Provided To:   Intervention Detail: New Rx: 1, reason: Patient Preference  Intervention Accepted By:   Gap Closed?:    Time Spent (min): 5

## 2025-08-15 RX ORDER — AMLODIPINE BESYLATE 10 MG/1
10 TABLET ORAL DAILY
Qty: 90 TABLET | Refills: 1 | Status: SHIPPED | OUTPATIENT
Start: 2025-08-15

## 2025-08-21 ENCOUNTER — HOSPITAL ENCOUNTER (OUTPATIENT)
Facility: HOSPITAL | Age: 60
Setting detail: INFUSION SERIES
Discharge: HOME | End: 2025-08-21
Payer: MEDICAID

## 2025-08-21 VITALS
HEIGHT: 60 IN | SYSTOLIC BLOOD PRESSURE: 116 MMHG | BODY MASS INDEX: 22.28 KG/M2 | WEIGHT: 113.5 LBS | RESPIRATION RATE: 16 BRPM | TEMPERATURE: 98 F | HEART RATE: 78 BPM | OXYGEN SATURATION: 95 % | DIASTOLIC BLOOD PRESSURE: 70 MMHG

## 2025-08-21 DIAGNOSIS — M81.0 OSTEOPOROSIS, UNSPECIFIED OSTEOPOROSIS TYPE, UNSPECIFIED PATHOLOGICAL FRACTURE PRESENCE: Primary | ICD-10-CM

## 2025-08-21 LAB
ANION GAP BLD CALC-SCNC: 9.1 MMOL/L (ref 10–20)
CA-I BLD-MCNC: 1.21 MMOL/L (ref 1.15–1.33)
CHLORIDE BLD-SCNC: 107 MMOL/L (ref 98–107)
CO2 BLD-SCNC: 27.9 MMOL/L (ref 21–32)
CREAT BLD-MCNC: 0.62 MG/DL (ref 0.6–1.3)
GLUCOSE BLD-MCNC: 92 MG/DL (ref 74–99)
PHOSPHATE SERPL-MCNC: 2.7 MG/DL (ref 2.5–4.5)
POTASSIUM BLD-SCNC: 3.8 MMOL/L (ref 3.5–5.1)
SERVICE CMNT-IMP: ABNORMAL
SODIUM BLD-SCNC: 144 MMOL/L (ref 136–145)

## 2025-08-21 PROCEDURE — 84100 ASSAY OF PHOSPHORUS: CPT

## 2025-08-21 PROCEDURE — 6360000002 HC RX W HCPCS: Performed by: STUDENT IN AN ORGANIZED HEALTH CARE EDUCATION/TRAINING PROGRAM

## 2025-08-21 PROCEDURE — 36415 COLL VENOUS BLD VENIPUNCTURE: CPT

## 2025-08-21 PROCEDURE — 96372 THER/PROPH/DIAG INJ SC/IM: CPT

## 2025-08-21 PROCEDURE — 80047 BASIC METABLC PNL IONIZED CA: CPT

## 2025-08-21 RX ORDER — LEFLUNOMIDE 10 MG/1
10 TABLET ORAL DAILY
COMMUNITY

## 2025-08-21 RX ADMIN — DENOSUMAB 60 MG: 60 INJECTION SUBCUTANEOUS at 09:45

## 2025-08-21 ASSESSMENT — PAIN SCALES - GENERAL: PAINLEVEL_OUTOF10: 0

## 2025-08-26 ENCOUNTER — TELEPHONE (OUTPATIENT)
Age: 60
End: 2025-08-26

## (undated) DEVICE — ELECTRODE ES L12.7CM BALL MPLR OPN APPRCH EZ TO CLN LESS

## (undated) DEVICE — ELECTRODE LOOP 12X20MM SHFT L5IN DIA3/32IN LEEP LLETZ

## (undated) DEVICE — D&C MRMC: Brand: MEDLINE INDUSTRIES, INC.

## (undated) DEVICE — ELECTRODE PT RET AD L9FT HI MOIST COND ADH HYDRGEL CORDED

## (undated) DEVICE — COVER LT HNDL PLAS RIG 1 PER PK

## (undated) DEVICE — GLOVE SURG SZ 65 THK91MIL LTX FREE SYN POLYISOPRENE

## (undated) DEVICE — GLOVE SURG SZ 65 L12IN FNGR THK79MIL GRN LTX FREE

## (undated) DEVICE — SUTURE PERMA-HAND SZ 3-0 L18IN NONABSORBABLE BLK L24MM PS-1 1684G

## (undated) DEVICE — PENCIL SMK EVAC ALL IN 1 DSGN ENH VISIBILITY IMPROVED AIR